# Patient Record
Sex: MALE | Race: WHITE | NOT HISPANIC OR LATINO | URBAN - METROPOLITAN AREA
[De-identification: names, ages, dates, MRNs, and addresses within clinical notes are randomized per-mention and may not be internally consistent; named-entity substitution may affect disease eponyms.]

---

## 2020-04-11 ENCOUNTER — INPATIENT (INPATIENT)
Facility: HOSPITAL | Age: 80
LOS: 9 days | Discharge: NURSING, REHAB OR LONG-TERM CARE | End: 2020-04-21
Attending: HOSPITALIST | Admitting: HOSPITALIST
Payer: COMMERCIAL

## 2020-04-11 VITALS
RESPIRATION RATE: 28 BRPM | DIASTOLIC BLOOD PRESSURE: 80 MMHG | TEMPERATURE: 99 F | HEART RATE: 83 BPM | SYSTOLIC BLOOD PRESSURE: 146 MMHG | OXYGEN SATURATION: 84 %

## 2020-04-11 DIAGNOSIS — R73.03 PREDIABETES: ICD-10-CM

## 2020-04-11 DIAGNOSIS — R09.02 HYPOXEMIA: ICD-10-CM

## 2020-04-11 DIAGNOSIS — I10 ESSENTIAL (PRIMARY) HYPERTENSION: ICD-10-CM

## 2020-04-11 DIAGNOSIS — E03.9 HYPOTHYROIDISM, UNSPECIFIED: ICD-10-CM

## 2020-04-11 DIAGNOSIS — K21.9 GASTRO-ESOPHAGEAL REFLUX DISEASE WITHOUT ESOPHAGITIS: ICD-10-CM

## 2020-04-11 DIAGNOSIS — Z29.9 ENCOUNTER FOR PROPHYLACTIC MEASURES, UNSPECIFIED: ICD-10-CM

## 2020-04-11 DIAGNOSIS — E78.5 HYPERLIPIDEMIA, UNSPECIFIED: ICD-10-CM

## 2020-04-11 DIAGNOSIS — Z71.89 OTHER SPECIFIED COUNSELING: ICD-10-CM

## 2020-04-11 DIAGNOSIS — R41.0 DISORIENTATION, UNSPECIFIED: ICD-10-CM

## 2020-04-11 DIAGNOSIS — U07.1 COVID-19: ICD-10-CM

## 2020-04-11 LAB
ALBUMIN SERPL ELPH-MCNC: 3.3 G/DL — SIGNIFICANT CHANGE UP (ref 3.3–5)
ALP SERPL-CCNC: 183 U/L — HIGH (ref 40–120)
ALT FLD-CCNC: 30 U/L — SIGNIFICANT CHANGE UP (ref 4–41)
ANION GAP SERPL CALC-SCNC: 17 MMO/L — HIGH (ref 7–14)
APPEARANCE UR: CLEAR — SIGNIFICANT CHANGE UP
APTT BLD: 31.3 SEC — SIGNIFICANT CHANGE UP (ref 27.5–36.3)
APTT BLD: 32.3 SEC — SIGNIFICANT CHANGE UP (ref 27.5–36.3)
AST SERPL-CCNC: 33 U/L — SIGNIFICANT CHANGE UP (ref 4–40)
BASOPHILS # BLD AUTO: 0.02 K/UL — SIGNIFICANT CHANGE UP (ref 0–0.2)
BASOPHILS NFR BLD AUTO: 0.2 % — SIGNIFICANT CHANGE UP (ref 0–2)
BILIRUB SERPL-MCNC: 0.7 MG/DL — SIGNIFICANT CHANGE UP (ref 0.2–1.2)
BILIRUB UR-MCNC: NEGATIVE — SIGNIFICANT CHANGE UP
BLOOD UR QL VISUAL: NEGATIVE — SIGNIFICANT CHANGE UP
BUN SERPL-MCNC: 23 MG/DL — SIGNIFICANT CHANGE UP (ref 7–23)
CALCIUM SERPL-MCNC: 10 MG/DL — SIGNIFICANT CHANGE UP (ref 8.4–10.5)
CHLORIDE SERPL-SCNC: 97 MMOL/L — LOW (ref 98–107)
CO2 SERPL-SCNC: 18 MMOL/L — LOW (ref 22–31)
COARSE GRAN CASTS #/AREA URNS AUTO: SIGNIFICANT CHANGE UP (ref 0–?)
COLOR SPEC: SIGNIFICANT CHANGE UP
CREAT SERPL-MCNC: 1.03 MG/DL — SIGNIFICANT CHANGE UP (ref 0.5–1.3)
CRP SERPL-MCNC: 174.1 MG/L — HIGH
D DIMER BLD IA.RAPID-MCNC: 4669 NG/ML — SIGNIFICANT CHANGE UP
EOSINOPHIL # BLD AUTO: 0.08 K/UL — SIGNIFICANT CHANGE UP (ref 0–0.5)
EOSINOPHIL NFR BLD AUTO: 0.8 % — SIGNIFICANT CHANGE UP (ref 0–6)
EPI CELLS # UR: SIGNIFICANT CHANGE UP
FIBRINOGEN PPP-MCNC: 1018 MG/DL — HIGH (ref 300–520)
GLUCOSE BLDC GLUCOMTR-MCNC: 167 MG/DL — HIGH (ref 70–99)
GLUCOSE SERPL-MCNC: 142 MG/DL — HIGH (ref 70–99)
GLUCOSE UR-MCNC: NEGATIVE — SIGNIFICANT CHANGE UP
HCT VFR BLD CALC: 41.5 % — SIGNIFICANT CHANGE UP (ref 39–50)
HGB BLD-MCNC: 13.9 G/DL — SIGNIFICANT CHANGE UP (ref 13–17)
HYALINE CASTS # UR AUTO: SIGNIFICANT CHANGE UP
IMM GRANULOCYTES NFR BLD AUTO: 0.5 % — SIGNIFICANT CHANGE UP (ref 0–1.5)
INR BLD: 2.46 — HIGH (ref 0.88–1.17)
INR BLD: 2.56 — HIGH (ref 0.88–1.17)
KETONES UR-MCNC: NEGATIVE — SIGNIFICANT CHANGE UP
LEUKOCYTE ESTERASE UR-ACNC: NEGATIVE — SIGNIFICANT CHANGE UP
LYMPHOCYTES # BLD AUTO: 0.96 K/UL — LOW (ref 1–3.3)
LYMPHOCYTES # BLD AUTO: 9.8 % — LOW (ref 13–44)
MCHC RBC-ENTMCNC: 28.6 PG — SIGNIFICANT CHANGE UP (ref 27–34)
MCHC RBC-ENTMCNC: 33.5 % — SIGNIFICANT CHANGE UP (ref 32–36)
MCV RBC AUTO: 85.4 FL — SIGNIFICANT CHANGE UP (ref 80–100)
MONOCYTES # BLD AUTO: 0.68 K/UL — SIGNIFICANT CHANGE UP (ref 0–0.9)
MONOCYTES NFR BLD AUTO: 7 % — SIGNIFICANT CHANGE UP (ref 2–14)
MUCOUS THREADS # UR AUTO: PRESENT — SIGNIFICANT CHANGE UP
NEUTROPHILS # BLD AUTO: 7.96 K/UL — HIGH (ref 1.8–7.4)
NEUTROPHILS NFR BLD AUTO: 81.7 % — HIGH (ref 43–77)
NITRITE UR-MCNC: NEGATIVE — SIGNIFICANT CHANGE UP
NRBC # FLD: 0 K/UL — SIGNIFICANT CHANGE UP (ref 0–0)
PH UR: 6 — SIGNIFICANT CHANGE UP (ref 5–8)
PLATELET # BLD AUTO: 353 K/UL — SIGNIFICANT CHANGE UP (ref 150–400)
PMV BLD: 10.1 FL — SIGNIFICANT CHANGE UP (ref 7–13)
POTASSIUM SERPL-MCNC: 4.6 MMOL/L — SIGNIFICANT CHANGE UP (ref 3.5–5.3)
POTASSIUM SERPL-SCNC: 4.6 MMOL/L — SIGNIFICANT CHANGE UP (ref 3.5–5.3)
PROCALCITONIN SERPL-MCNC: 0.42 NG/ML — HIGH (ref 0.02–0.1)
PROT SERPL-MCNC: 8.3 G/DL — SIGNIFICANT CHANGE UP (ref 6–8.3)
PROT UR-MCNC: 30 — SIGNIFICANT CHANGE UP
PROTHROM AB SERPL-ACNC: 29.1 SEC — HIGH (ref 9.8–13.1)
PROTHROM AB SERPL-ACNC: 30.1 SEC — HIGH (ref 9.8–13.1)
RBC # BLD: 4.86 M/UL — SIGNIFICANT CHANGE UP (ref 4.2–5.8)
RBC # FLD: 13.2 % — SIGNIFICANT CHANGE UP (ref 10.3–14.5)
RBC CASTS # UR COMP ASSIST: SIGNIFICANT CHANGE UP (ref 0–?)
SARS-COV-2 RNA SPEC QL NAA+PROBE: DETECTED
SODIUM SERPL-SCNC: 132 MMOL/L — LOW (ref 135–145)
SP GR SPEC: 1.01 — SIGNIFICANT CHANGE UP (ref 1–1.04)
UROBILINOGEN FLD QL: NORMAL — SIGNIFICANT CHANGE UP
WBC # BLD: 9.75 K/UL — SIGNIFICANT CHANGE UP (ref 3.8–10.5)
WBC # FLD AUTO: 9.75 K/UL — SIGNIFICANT CHANGE UP (ref 3.8–10.5)
WBC UR QL: SIGNIFICANT CHANGE UP (ref 0–?)

## 2020-04-11 PROCEDURE — 70450 CT HEAD/BRAIN W/O DYE: CPT | Mod: 26

## 2020-04-11 PROCEDURE — 71045 X-RAY EXAM CHEST 1 VIEW: CPT | Mod: 26

## 2020-04-11 PROCEDURE — 71275 CT ANGIOGRAPHY CHEST: CPT | Mod: 26

## 2020-04-11 PROCEDURE — 99233 SBSQ HOSP IP/OBS HIGH 50: CPT

## 2020-04-11 RX ORDER — ENOXAPARIN SODIUM 100 MG/ML
30 INJECTION SUBCUTANEOUS ONCE
Refills: 0 | Status: COMPLETED | OUTPATIENT
Start: 2020-04-11 | End: 2020-04-11

## 2020-04-11 RX ORDER — DEXTROSE 50 % IN WATER 50 %
25 SYRINGE (ML) INTRAVENOUS ONCE
Refills: 0 | Status: DISCONTINUED | OUTPATIENT
Start: 2020-04-11 | End: 2020-04-21

## 2020-04-11 RX ORDER — ALBUTEROL 90 UG/1
2 AEROSOL, METERED ORAL EVERY 6 HOURS
Refills: 0 | Status: DISCONTINUED | OUTPATIENT
Start: 2020-04-11 | End: 2020-04-21

## 2020-04-11 RX ORDER — METFORMIN HYDROCHLORIDE 850 MG/1
500 TABLET ORAL
Qty: 0 | Refills: 0 | DISCHARGE

## 2020-04-11 RX ORDER — LEVOTHYROXINE SODIUM 125 MCG
75 TABLET ORAL DAILY
Refills: 0 | Status: DISCONTINUED | OUTPATIENT
Start: 2020-04-11 | End: 2020-04-21

## 2020-04-11 RX ORDER — TAMSULOSIN HYDROCHLORIDE 0.4 MG/1
0.4 CAPSULE ORAL AT BEDTIME
Refills: 0 | Status: DISCONTINUED | OUTPATIENT
Start: 2020-04-11 | End: 2020-04-21

## 2020-04-11 RX ORDER — ACETAMINOPHEN 500 MG
650 TABLET ORAL ONCE
Refills: 0 | Status: COMPLETED | OUTPATIENT
Start: 2020-04-11 | End: 2020-04-11

## 2020-04-11 RX ORDER — PANTOPRAZOLE SODIUM 20 MG/1
40 TABLET, DELAYED RELEASE ORAL
Refills: 0 | Status: DISCONTINUED | OUTPATIENT
Start: 2020-04-11 | End: 2020-04-21

## 2020-04-11 RX ORDER — ENOXAPARIN SODIUM 100 MG/ML
40 INJECTION SUBCUTANEOUS DAILY
Refills: 0 | Status: DISCONTINUED | OUTPATIENT
Start: 2020-04-11 | End: 2020-04-11

## 2020-04-11 RX ORDER — FOLIC ACID 0.8 MG
1 TABLET ORAL DAILY
Refills: 0 | Status: DISCONTINUED | OUTPATIENT
Start: 2020-04-11 | End: 2020-04-21

## 2020-04-11 RX ORDER — LEVOTHYROXINE SODIUM 125 MCG
1 TABLET ORAL
Qty: 0 | Refills: 0 | DISCHARGE

## 2020-04-11 RX ORDER — ENOXAPARIN SODIUM 100 MG/ML
70 INJECTION SUBCUTANEOUS
Refills: 0 | Status: DISCONTINUED | OUTPATIENT
Start: 2020-04-12 | End: 2020-04-21

## 2020-04-11 RX ORDER — PANTOPRAZOLE SODIUM 20 MG/1
1 TABLET, DELAYED RELEASE ORAL
Qty: 0 | Refills: 0 | DISCHARGE

## 2020-04-11 RX ORDER — ACETAMINOPHEN 500 MG
650 TABLET ORAL EVERY 4 HOURS
Refills: 0 | Status: DISCONTINUED | OUTPATIENT
Start: 2020-04-11 | End: 2020-04-21

## 2020-04-11 RX ORDER — GLUCAGON INJECTION, SOLUTION 0.5 MG/.1ML
1 INJECTION, SOLUTION SUBCUTANEOUS ONCE
Refills: 0 | Status: DISCONTINUED | OUTPATIENT
Start: 2020-04-11 | End: 2020-04-21

## 2020-04-11 RX ORDER — METOPROLOL TARTRATE 50 MG
1 TABLET ORAL
Qty: 0 | Refills: 0 | DISCHARGE

## 2020-04-11 RX ORDER — FOLIC ACID 0.8 MG
1 TABLET ORAL
Qty: 0 | Refills: 0 | DISCHARGE

## 2020-04-11 RX ORDER — SIMVASTATIN 20 MG/1
20 TABLET, FILM COATED ORAL AT BEDTIME
Refills: 0 | Status: DISCONTINUED | OUTPATIENT
Start: 2020-04-11 | End: 2020-04-21

## 2020-04-11 RX ORDER — DEXTROSE 50 % IN WATER 50 %
15 SYRINGE (ML) INTRAVENOUS ONCE
Refills: 0 | Status: DISCONTINUED | OUTPATIENT
Start: 2020-04-11 | End: 2020-04-21

## 2020-04-11 RX ORDER — INSULIN LISPRO 100/ML
VIAL (ML) SUBCUTANEOUS
Refills: 0 | Status: DISCONTINUED | OUTPATIENT
Start: 2020-04-11 | End: 2020-04-21

## 2020-04-11 RX ORDER — FUROSEMIDE 40 MG
20 TABLET ORAL ONCE
Refills: 0 | Status: COMPLETED | OUTPATIENT
Start: 2020-04-11 | End: 2020-04-11

## 2020-04-11 RX ORDER — SIMVASTATIN 20 MG/1
1 TABLET, FILM COATED ORAL
Qty: 0 | Refills: 0 | DISCHARGE

## 2020-04-11 RX ORDER — METOPROLOL TARTRATE 50 MG
25 TABLET ORAL DAILY
Refills: 0 | Status: DISCONTINUED | OUTPATIENT
Start: 2020-04-11 | End: 2020-04-21

## 2020-04-11 RX ORDER — DEXTROSE 50 % IN WATER 50 %
12.5 SYRINGE (ML) INTRAVENOUS ONCE
Refills: 0 | Status: DISCONTINUED | OUTPATIENT
Start: 2020-04-11 | End: 2020-04-21

## 2020-04-11 RX ORDER — SODIUM CHLORIDE 9 MG/ML
1000 INJECTION, SOLUTION INTRAVENOUS
Refills: 0 | Status: DISCONTINUED | OUTPATIENT
Start: 2020-04-11 | End: 2020-04-21

## 2020-04-11 RX ORDER — METFORMIN HYDROCHLORIDE 850 MG/1
500 TABLET ORAL DAILY
Refills: 0 | Status: DISCONTINUED | OUTPATIENT
Start: 2020-04-11 | End: 2020-04-11

## 2020-04-11 RX ADMIN — SIMVASTATIN 20 MILLIGRAM(S): 20 TABLET, FILM COATED ORAL at 22:55

## 2020-04-11 RX ADMIN — Medication 40 MILLIGRAM(S): at 05:02

## 2020-04-11 RX ADMIN — TAMSULOSIN HYDROCHLORIDE 0.4 MILLIGRAM(S): 0.4 CAPSULE ORAL at 22:55

## 2020-04-11 RX ADMIN — Medication 1 MILLIGRAM(S): at 13:01

## 2020-04-11 RX ADMIN — ENOXAPARIN SODIUM 40 MILLIGRAM(S): 100 INJECTION SUBCUTANEOUS at 17:40

## 2020-04-11 RX ADMIN — Medication 20 MILLIGRAM(S): at 05:02

## 2020-04-11 RX ADMIN — Medication 650 MILLIGRAM(S): at 05:02

## 2020-04-11 RX ADMIN — METFORMIN HYDROCHLORIDE 500 MILLIGRAM(S): 850 TABLET ORAL at 13:02

## 2020-04-11 RX ADMIN — ENOXAPARIN SODIUM 30 MILLIGRAM(S): 100 INJECTION SUBCUTANEOUS at 22:55

## 2020-04-11 NOTE — ED PROVIDER NOTE - PMH
Gastroesophageal reflux disease, esophagitis presence not specified    HLD (hyperlipidemia)    HTN (hypertension)    Hypothyroid

## 2020-04-11 NOTE — ED PROVIDER NOTE - PHYSICAL EXAMINATION
General appearance: patient tachypneic.   Eyes: anicteric sclerae, moist conjunctivae   HENT: Atraumatic; oropharynx clear with dry mucous membranes   Neck: Trachea midline; Full range of motion   Pulm: Patient tachypneic with mild suprasternal accessory muscle use.   CV: Regular Rhythm and Rate;    Abdomen: Soft, non-tender, non-distended   Extremities: No peripheral edema   Skin: Normal temperature, turgor and texture

## 2020-04-11 NOTE — H&P ADULT - ASSESSMENT
81 y/o M admitted for acute hypoxic respiratory failure possibly secondary to COVID-19 infection. He has radiographic evidence of B/L PNA. He had been treated with a 5 day course of Plaquenil and azithromycin out OSH.

## 2020-04-11 NOTE — ED PROVIDER NOTE - ATTENDING CONTRIBUTION TO CARE
Attending Attestation: Dr. Goldstein  I have personally performed a history and physical examination of the patient and discussed management with the resident as well as the patient.  I reviewed the resident's note and agree with the documented findings and plan of care.  I have authored and modified critical sections of the Provider Note, including but not limited to HPI, Physical Exam and MDM. 80 year old M PMH hypothyroidism, HTN, BPH, GERD presenting for shortness of breath. Likely 2/2 COVID19. CXR with bilateral patchy opacities. Patient requiring oxygen and is tachypneic. Will administer steroids and lasix. CTH and UA to r/o other causes for confusion. Admit.

## 2020-04-11 NOTE — ED ADULT NURSE NOTE - CHIEF COMPLAINT QUOTE
Patient complaining of increase shortness of breath this evening. denies chest pain. the shortness of breath woke him up from his sleep. patient denies fever/chills. patient arrive to ED on 4LNC 02 sat 84%. placed on non-rebreather 100% now 94%. tachypneic RR 28 with increase work of breathing. patient recently was dx with pneumonia, finished ABT

## 2020-04-11 NOTE — H&P ADULT - PROBLEM SELECTOR PLAN 4
Continue home dose of pantoprazole Head CT negative for any cute changes  Likely toxic metabolic in setting of COVID-19  - continue to monitor mental exam

## 2020-04-11 NOTE — H&P ADULT - PROBLEM SELECTOR PLAN 3
Head CT negative for any cute changes. Advanced directives not specified by family yet. Daughter will discuss with her mother aka wife of the patient.   - consider consulting hospital clinical review team if needed

## 2020-04-11 NOTE — ED PROVIDER NOTE - CLINICAL SUMMARY MEDICAL DECISION MAKING FREE TEXT BOX
80 year old M PMH hypothyroidism, HTN, BPH, GERD presenting for shortness of breath. Likely 2/2 COVID19. CXR with bilateral patchy opacities. Patient requiring oxygen, but is tachypneic. Will administer steroids and lasix. CTH and UA to r/o other causes for confusion. Admit. 80 year old M PMH hypothyroidism, HTN, BPH, GERD presenting for shortness of breath. Likely 2/2 COVID19. CXR with bilateral patchy opacities. Patient requiring oxygen and is tachypneic. Will administer steroids and lasix. CTH and UA to r/o other causes for confusion. Admit.

## 2020-04-11 NOTE — ED PROVIDER NOTE - OBJECTIVE STATEMENT
80 year old M PMH hypothyroidism, HTN, BPH, GERD presenting for shortness of breath. Patient is AOx4 at baseline but is confused in the ER. History obtained from his daughter and HCP Dionne. According to her, on 3/31 he went to Urgent Care for SOB. Was sent to [facility] for bilateral PNA. Was tested for COVID19 there but she was not told the result. She does know that he was discharged on plaquenil , z pack and tylenol. Dc on 4/2. Was at home on quarantined. Done with meds for 3-4 days. Was improving but then hallucinated 2-3x. Called pcp, Dr Ariza, and the thought was that it was dehydration. Tonight, patient was still coughing and moaning while sleeping and struggling for breath.   Home meds:   levothyroxine 75mcg  metoprolol 25mg qd  pantoprazole 40  folic acid 1mg  tamsulosin 0.4mg  simvastast 20mg  metformin    HCP: Dionne Howard, daughter. Patient has not discussed advanced directives. Explained to daughter that patient is elderly and would not do well on a ventilator. She understood and will consider advanced directives. 80 year old M PMH hypothyroidism, HTN, BPH, GERD presenting for shortness of breath. Patient is AOx4 at baseline but is confused in the ER. History obtained from his daughter and HCP Dionne. According to her, on 3/31 he went to Urgent Care for SOB. Was sent to [facility] for bilateral PNA. Was tested for COVID19 there but she was not told the result. She does know that he was discharged on plaquenil , z pack and tylenol. Dc on 4/2. Was at home on quarantined. Done with meds for 3-4 days. Was improving but then hallucinated 2-3x. Called pcp, Dr Ariza, and the thought was that it was dehydration. Tonight, patient was still coughing and moaning while sleeping and struggling for breath. Is in NAD at present, but tachypneic and confused.   Home meds:   levothyroxine 75mcg  metoprolol 25mg qd  pantoprazole 40  folic acid 1mg  tamsulosin 0.4mg  simvastast 20mg  metformin    HCP: Dionne Howard, daughter. Patient has not discussed advanced directives. Explained to daughter that patient is elderly and would not do well on a ventilator. She understood and will consider advanced directives.

## 2020-04-11 NOTE — ED PROVIDER NOTE - CRITICAL CARE PROVIDED
telephone consultation with the patient's family/documentation/direct patient care (not related to procedure)/additional history taking/interpretation of diagnostic studies

## 2020-04-11 NOTE — H&P ADULT - PROBLEM SELECTOR PLAN 1
- Currently on 4 L NC with sats >94%. Monitor sats.   - COVID-PCR pending   - s/p plaquenil and azithromycin.   - Advanced directives not specified by family yet. Daughter will discuss with her mother aka wife of the patient.   - Anticoagulation as per protocol.   - Labs as per protocol. - Currently on 4 L NC with sats >94%. Monitor sats.   - COVID-PCR pending   - s/p plaquenil and azithromycin.   - Anticoagulation as per protocol.   - Labs as per protocol.

## 2020-04-11 NOTE — H&P ADULT - PROBLEM SELECTOR PLAN 5
- Continue home dose of Simvastin Blood sugars stable  Holding home metformin  - MEE, FS QID, tailor insulin accordingly

## 2020-04-11 NOTE — H&P ADULT - HISTORY OF PRESENT ILLNESS
80 year old M PMH hypothyroidism, HTN, BPH, GERD presenting for shortness of breath. Patient is AOx4 at baseline but is confused in the ER. History obtained from his daughter and HCP Dionne via phone. According to her, on 3/31 he went to Urgent Care for SOB. Was sent to [facility] for bilateral PNA. Was tested for COVID19 there but she was not told the result. She does know that he was discharged on plaquenil , z pack and tylenol. Dc on 4/2. Was at home on quarantined. Done with meds for 3-4 days. Was improving but then hallucinated 2-3x yesterday. Called pcp, Dr Ariza, and the thought was that it was dehydration. Tonight, patient was still coughing and moaning while sleeping and struggling for breath.   HCP: Dionne Howard, daughter. Patient has not discussed advanced directives. Explained to daughter that patient is elderly and would not do well on a ventilator. She understood and will consider advanced directives.

## 2020-04-11 NOTE — H&P ADULT - PROBLEM SELECTOR PLAN 2
- Continue home dose of metformin Likely 2/2 COVID-19, high suspicion for VTE given markedly elevated d-dimer  - c/w management for COVID-19 as above  - CT angio chest ordered urgent  - albuterol, IS  - wean O2 as tolerated

## 2020-04-11 NOTE — ED ADULT TRIAGE NOTE - CHIEF COMPLAINT QUOTE
Patient complaining of increase shortness of breath this evening. denies chest pain. the shortness of breath woke him up from his sleep. patient denies fever/chills. patient arrive to ED on 4LNC 02 sat 84%. placed on non-rebreather 100% now 94%. tachypneic RR 28 with increase work of breathing Patient complaining of increase shortness of breath this evening. denies chest pain. the shortness of breath woke him up from his sleep. patient denies fever/chills. patient arrive to ED on 4LNC 02 sat 84%. placed on non-rebreather 100% now 94%. tachypneic RR 28 with increase work of breathing. patient recently was dx with pneumonia, finished ABT

## 2020-04-11 NOTE — H&P ADULT - NSICDXPASTMEDICALHX_GEN_ALL_CORE_FT
PAST MEDICAL HISTORY:  Gastroesophageal reflux disease, esophagitis presence not specified     HLD (hyperlipidemia)     HTN (hypertension)     Hypothyroid     Pre-diabetes

## 2020-04-11 NOTE — H&P ADULT - NSHPLABSRESULTS_GEN_ALL_CORE
.  LABS:                         13.9   9.75  )-----------( 353      ( 11 Apr 2020 04:00 )             41.5     04-11    132<L>  |  97<L>  |  23  ----------------------------<  142<H>  4.6   |  18<L>  |  1.03    Ca    10.0      11 Apr 2020 04:00    TPro  8.3  /  Alb  3.3  /  TBili  0.7  /  DBili  x   /  AST  33  /  ALT  30  /  AlkPhos  183<H>  04-11    PT/INR - ( 11 Apr 2020 04:00 )   PT: 30.1 SEC;   INR: 2.56          PTT - ( 11 Apr 2020 04:00 )  PTT:31.3 SEC        RADIOLOGY, EKG & ADDITIONAL TESTS: Reviewed.

## 2020-04-12 LAB
CULTURE RESULTS: SIGNIFICANT CHANGE UP
GLUCOSE BLDC GLUCOMTR-MCNC: 101 MG/DL — HIGH (ref 70–99)
GLUCOSE BLDC GLUCOMTR-MCNC: 136 MG/DL — HIGH (ref 70–99)
GLUCOSE BLDC GLUCOMTR-MCNC: 160 MG/DL — HIGH (ref 70–99)
GLUCOSE BLDC GLUCOMTR-MCNC: 94 MG/DL — SIGNIFICANT CHANGE UP (ref 70–99)
HBA1C BLD-MCNC: 6.8 % — HIGH (ref 4–5.6)
SPECIMEN SOURCE: SIGNIFICANT CHANGE UP

## 2020-04-12 PROCEDURE — 99233 SBSQ HOSP IP/OBS HIGH 50: CPT

## 2020-04-12 RX ADMIN — Medication 25 MILLIGRAM(S): at 05:21

## 2020-04-12 RX ADMIN — SIMVASTATIN 20 MILLIGRAM(S): 20 TABLET, FILM COATED ORAL at 23:48

## 2020-04-12 RX ADMIN — ENOXAPARIN SODIUM 70 MILLIGRAM(S): 100 INJECTION SUBCUTANEOUS at 17:25

## 2020-04-12 RX ADMIN — TAMSULOSIN HYDROCHLORIDE 0.4 MILLIGRAM(S): 0.4 CAPSULE ORAL at 23:48

## 2020-04-12 RX ADMIN — ALBUTEROL 2 PUFF(S): 90 AEROSOL, METERED ORAL at 17:12

## 2020-04-12 RX ADMIN — Medication 75 MICROGRAM(S): at 05:21

## 2020-04-12 RX ADMIN — Medication 1 MILLIGRAM(S): at 12:31

## 2020-04-12 RX ADMIN — Medication 1: at 12:31

## 2020-04-12 RX ADMIN — PANTOPRAZOLE SODIUM 40 MILLIGRAM(S): 20 TABLET, DELAYED RELEASE ORAL at 06:55

## 2020-04-12 RX ADMIN — ALBUTEROL 2 PUFF(S): 90 AEROSOL, METERED ORAL at 04:00

## 2020-04-12 RX ADMIN — ALBUTEROL 2 PUFF(S): 90 AEROSOL, METERED ORAL at 23:49

## 2020-04-12 RX ADMIN — ALBUTEROL 2 PUFF(S): 90 AEROSOL, METERED ORAL at 11:00

## 2020-04-12 NOTE — PROGRESS NOTE ADULT - PROBLEM SELECTOR PLAN 4
Per chart review pt w/ preDM however on metformen at home. Blood sugars stable here  -Holding home metformin  -ISS for now and will adjust regimen accordingly   -f/u a1c level

## 2020-04-12 NOTE — PROGRESS NOTE ADULT - ATTENDING COMMENTS
Ronnie Garvey MD  Attending Physician, Hospitalist Medicine   (p) [phone]    CPT code: 30433  Acute hypoxic respiratory failure  Metabolic encephalopathy   Acute PE  COVID 19 positive   HTN  HLD  Hypothyroid  preDM

## 2020-04-12 NOTE — PROGRESS NOTE ADULT - SUBJECTIVE AND OBJECTIVE BOX
Patient is a 80y old  Male who presents with a chief complaint of The patient is a 80y Male complaining of shortness of breath (2020 08:50)      SUBJECTIVE / OVERNIGHT EVENTS:    Review of Systems:     MEDICATIONS  (STANDING):  ALBUTerol    90 MICROgram(s) HFA Inhaler 2 Puff(s) Inhalation every 6 hours  dextrose 5%. 1000 milliLiter(s) (50 mL/Hr) IV Continuous <Continuous>  dextrose 50% Injectable 12.5 Gram(s) IV Push once  dextrose 50% Injectable 25 Gram(s) IV Push once  dextrose 50% Injectable 25 Gram(s) IV Push once  enoxaparin Injectable 70 milliGRAM(s) SubCutaneous two times a day  folic acid 1 milliGRAM(s) Oral daily  insulin lispro (HumaLOG) corrective regimen sliding scale   SubCutaneous three times a day before meals  levothyroxine 75 MICROGram(s) Oral daily  metoprolol succinate ER 25 milliGRAM(s) Oral daily  pantoprazole    Tablet 40 milliGRAM(s) Oral before breakfast  simvastatin 20 milliGRAM(s) Oral at bedtime  tamsulosin 0.4 milliGRAM(s) Oral at bedtime    MEDICATIONS  (PRN):  acetaminophen   Tablet .. 650 milliGRAM(s) Oral every 4 hours PRN Temp greater or equal to 38.5C (101.3F)  acetaminophen  Suppository .. 650 milliGRAM(s) Rectal every 4 hours PRN Temp greater or equal to 38.5C (101.3F)  dextrose 40% Gel 15 Gram(s) Oral once PRN Blood Glucose LESS THAN 70 milliGRAM(s)/deciliter  glucagon  Injectable 1 milliGRAM(s) IntraMuscular once PRN Glucose LESS THAN 70 milligrams/deciliter      PHYSICAL EXAM:    Vital Signs Last 24 Hrs  T(C): 36.7 (2020 08:57), Max: 37.1 (2020 22:44)  T(F): 98 (2020 08:57), Max: 98.8 (2020 05:21)  HR: 80 (2020 08:57) (76 - 92)  BP: 128/76 (2020 08:57) (117/67 - 141/79)  BP(mean): 95 (2020 05:21) (95 - 95)  RR: 22 (2020 08:57) (20 - 22)  SpO2: 92% (2020 08:57) (85% - 94%)    LABS:  CAPILLARY BLOOD GLUCOSE      POCT Blood Glucose.: 136 mg/dL (2020 09:38)  POCT Blood Glucose.: 167 mg/dL (2020 17:25)                          13.9   9.75  )-----------( 353      ( 2020 04:00 )             41.5     04-11    132<L>  |  97<L>  |  23  ----------------------------<  142<H>  4.6   |  18<L>  |  1.03    Ca    10.0      2020 04:00    TPro  8.3  /  Alb  3.3  /  TBili  0.7  /  DBili  x   /  AST  33  /  ALT  30  /  AlkPhos  183<H>  04-11    PT/INR - ( 2020 13:10 )   PT: 29.1 SEC;   INR: 2.46          PTT - ( 2020 13:10 )  PTT:32.3 SEC      Urinalysis Basic - ( 2020 07:50 )    Color: LT. YELLOW / Appearance: CLEAR / S.014 / pH: 6.0  Gluc: NEGATIVE / Ketone: NEGATIVE  / Bili: NEGATIVE / Urobili: NORMAL   Blood: NEGATIVE / Protein: 30 / Nitrite: NEGATIVE   Leuk Esterase: NEGATIVE / RBC: 0-2 / WBC 2-5   Sq Epi: x / Non Sq Epi: OCC / Bacteria: x        RADIOLOGY & ADDITIONAL TESTS:    Imaging Personally Reviewed:    Consultant(s) Notes Reviewed:      Care Discussed with Consultants/Other Providers: Patient is a 80y old  Male who presents with a chief complaint of The patient is a 80y Male complaining of shortness of breath (2020 08:50)      SUBJECTIVE / OVERNIGHT EVENTS: Pt states that his SOB has improved. However, daughter states pt reported its about the same. Pt denies CP, n/v/d.     Review of Systems:     MEDICATIONS  (STANDING):  ALBUTerol    90 MICROgram(s) HFA Inhaler 2 Puff(s) Inhalation every 6 hours  dextrose 5%. 1000 milliLiter(s) (50 mL/Hr) IV Continuous <Continuous>  dextrose 50% Injectable 12.5 Gram(s) IV Push once  dextrose 50% Injectable 25 Gram(s) IV Push once  dextrose 50% Injectable 25 Gram(s) IV Push once  enoxaparin Injectable 70 milliGRAM(s) SubCutaneous two times a day  folic acid 1 milliGRAM(s) Oral daily  insulin lispro (HumaLOG) corrective regimen sliding scale   SubCutaneous three times a day before meals  levothyroxine 75 MICROGram(s) Oral daily  metoprolol succinate ER 25 milliGRAM(s) Oral daily  pantoprazole    Tablet 40 milliGRAM(s) Oral before breakfast  simvastatin 20 milliGRAM(s) Oral at bedtime  tamsulosin 0.4 milliGRAM(s) Oral at bedtime    MEDICATIONS  (PRN):  acetaminophen   Tablet .. 650 milliGRAM(s) Oral every 4 hours PRN Temp greater or equal to 38.5C (101.3F)  acetaminophen  Suppository .. 650 milliGRAM(s) Rectal every 4 hours PRN Temp greater or equal to 38.5C (101.3F)  dextrose 40% Gel 15 Gram(s) Oral once PRN Blood Glucose LESS THAN 70 milliGRAM(s)/deciliter  glucagon  Injectable 1 milliGRAM(s) IntraMuscular once PRN Glucose LESS THAN 70 milligrams/deciliter      PHYSICAL EXAM:    Vital Signs Last 24 Hrs  T(C): 36.7 (2020 08:57), Max: 37.1 (2020 22:44)  T(F): 98 (2020 08:57), Max: 98.8 (2020 05:21)  HR: 80 (2020 08:57) (76 - 92)  BP: 128/76 (2020 08:57) (117/67 - 141/79)  BP(mean): 95 (2020 05:21) (95 - 95)  RR: 22 (2020 08:57) (20 - 22)  SpO2: 92% (2020 08:57) (85% - 94%)  PHYSICAL EXAM:  Constitutional: NAD, elderly/frail  Eyes: EOMI, no scleral icterus or injection  ENMT: supple,   Respiratory: breathing comfortably   Cardiovascular: No LE edema   Gastrointestinal: Soft, NT/ND, +BS   Extremities: Warm and well perfused   Vascular: +DP/PT pulses   Neurological: AX0x3, non focal   Skin: unremarkable   Psychiatric: Normal mood and affect       LABS:  CAPILLARY BLOOD GLUCOSE      POCT Blood Glucose.: 136 mg/dL (2020 09:38)  POCT Blood Glucose.: 167 mg/dL (2020 17:25)                          13.9   9.75  )-----------( 353      ( 2020 04:00 )             41.5     04-11    132<L>  |  97<L>  |  23  ----------------------------<  142<H>  4.6   |  18<L>  |  1.03    Ca    10.0      2020 04:00    TPro  8.3  /  Alb  3.3  /  TBili  0.7  /  DBili  x   /  AST  33  /  ALT  30  /  AlkPhos  183<H>  04-11    PT/INR - ( 2020 13:10 )   PT: 29.1 SEC;   INR: 2.46          PTT - ( 2020 13:10 )  PTT:32.3 SEC      Urinalysis Basic - ( 2020 07:50 )    Color: LT. YELLOW / Appearance: CLEAR / S.014 / pH: 6.0  Gluc: NEGATIVE / Ketone: NEGATIVE  / Bili: NEGATIVE / Urobili: NORMAL   Blood: NEGATIVE / Protein: 30 / Nitrite: NEGATIVE   Leuk Esterase: NEGATIVE / RBC: 0-2 / WBC 2-5   Sq Epi: x / Non Sq Epi: OCC / Bacteria: x        RADIOLOGY & ADDITIONAL TESTS:    Imaging Personally Reviewed:    Consultant(s) Notes Reviewed:      Care Discussed with Consultants/Other Providers:

## 2020-04-13 LAB
ALBUMIN SERPL ELPH-MCNC: 3.2 G/DL — LOW (ref 3.3–5)
ALP SERPL-CCNC: 153 U/L — HIGH (ref 40–120)
ALT FLD-CCNC: 33 U/L — SIGNIFICANT CHANGE UP (ref 4–41)
ANION GAP SERPL CALC-SCNC: 19 MMO/L — HIGH (ref 7–14)
APTT BLD: 35.5 SEC — SIGNIFICANT CHANGE UP (ref 27.5–36.3)
AST SERPL-CCNC: 33 U/L — SIGNIFICANT CHANGE UP (ref 4–40)
BASOPHILS # BLD AUTO: 0.03 K/UL — SIGNIFICANT CHANGE UP (ref 0–0.2)
BASOPHILS NFR BLD AUTO: 0.3 % — SIGNIFICANT CHANGE UP (ref 0–2)
BILIRUB SERPL-MCNC: 0.6 MG/DL — SIGNIFICANT CHANGE UP (ref 0.2–1.2)
BUN SERPL-MCNC: 40 MG/DL — HIGH (ref 7–23)
CALCIUM SERPL-MCNC: 10.6 MG/DL — HIGH (ref 8.4–10.5)
CHLORIDE SERPL-SCNC: 103 MMOL/L — SIGNIFICANT CHANGE UP (ref 98–107)
CO2 SERPL-SCNC: 18 MMOL/L — LOW (ref 22–31)
CREAT SERPL-MCNC: 1.16 MG/DL — SIGNIFICANT CHANGE UP (ref 0.5–1.3)
CRP SERPL-MCNC: 133.6 MG/L — HIGH
D DIMER BLD IA.RAPID-MCNC: 2992 NG/ML — SIGNIFICANT CHANGE UP
EOSINOPHIL # BLD AUTO: 0.07 K/UL — SIGNIFICANT CHANGE UP (ref 0–0.5)
EOSINOPHIL NFR BLD AUTO: 0.7 % — SIGNIFICANT CHANGE UP (ref 0–6)
FERRITIN SERPL-MCNC: 1177 NG/ML — HIGH (ref 30–400)
GLUCOSE BLDC GLUCOMTR-MCNC: 121 MG/DL — HIGH (ref 70–99)
GLUCOSE BLDC GLUCOMTR-MCNC: 162 MG/DL — HIGH (ref 70–99)
GLUCOSE SERPL-MCNC: 153 MG/DL — HIGH (ref 70–99)
HCT VFR BLD CALC: 42.4 % — SIGNIFICANT CHANGE UP (ref 39–50)
HGB BLD-MCNC: 13.7 G/DL — SIGNIFICANT CHANGE UP (ref 13–17)
IMM GRANULOCYTES NFR BLD AUTO: 0.3 % — SIGNIFICANT CHANGE UP (ref 0–1.5)
INR BLD: 2.35 — HIGH (ref 0.88–1.17)
LYMPHOCYTES # BLD AUTO: 0.91 K/UL — LOW (ref 1–3.3)
LYMPHOCYTES # BLD AUTO: 9.4 % — LOW (ref 13–44)
MAGNESIUM SERPL-MCNC: 2.4 MG/DL — SIGNIFICANT CHANGE UP (ref 1.6–2.6)
MCHC RBC-ENTMCNC: 28.2 PG — SIGNIFICANT CHANGE UP (ref 27–34)
MCHC RBC-ENTMCNC: 32.3 % — SIGNIFICANT CHANGE UP (ref 32–36)
MCV RBC AUTO: 87.4 FL — SIGNIFICANT CHANGE UP (ref 80–100)
MONOCYTES # BLD AUTO: 0.84 K/UL — SIGNIFICANT CHANGE UP (ref 0–0.9)
MONOCYTES NFR BLD AUTO: 8.7 % — SIGNIFICANT CHANGE UP (ref 2–14)
NEUTROPHILS # BLD AUTO: 7.79 K/UL — HIGH (ref 1.8–7.4)
NEUTROPHILS NFR BLD AUTO: 80.6 % — HIGH (ref 43–77)
NRBC # FLD: 0 K/UL — SIGNIFICANT CHANGE UP (ref 0–0)
PHOSPHATE SERPL-MCNC: 3.3 MG/DL — SIGNIFICANT CHANGE UP (ref 2.5–4.5)
PLATELET # BLD AUTO: 359 K/UL — SIGNIFICANT CHANGE UP (ref 150–400)
PMV BLD: 10.8 FL — SIGNIFICANT CHANGE UP (ref 7–13)
POTASSIUM SERPL-MCNC: 4.4 MMOL/L — SIGNIFICANT CHANGE UP (ref 3.5–5.3)
POTASSIUM SERPL-SCNC: 4.4 MMOL/L — SIGNIFICANT CHANGE UP (ref 3.5–5.3)
PROCALCITONIN SERPL-MCNC: 0.3 NG/ML — HIGH (ref 0.02–0.1)
PROT SERPL-MCNC: 8.5 G/DL — HIGH (ref 6–8.3)
PROTHROM AB SERPL-ACNC: 27.7 SEC — HIGH (ref 9.8–13.1)
RBC # BLD: 4.85 M/UL — SIGNIFICANT CHANGE UP (ref 4.2–5.8)
RBC # FLD: 13.5 % — SIGNIFICANT CHANGE UP (ref 10.3–14.5)
SODIUM SERPL-SCNC: 140 MMOL/L — SIGNIFICANT CHANGE UP (ref 135–145)
TROPONIN T, HIGH SENSITIVITY: 21 NG/L — SIGNIFICANT CHANGE UP (ref ?–14)
WBC # BLD: 9.67 K/UL — SIGNIFICANT CHANGE UP (ref 3.8–10.5)
WBC # FLD AUTO: 9.67 K/UL — SIGNIFICANT CHANGE UP (ref 3.8–10.5)

## 2020-04-13 PROCEDURE — 99233 SBSQ HOSP IP/OBS HIGH 50: CPT

## 2020-04-13 RX ADMIN — SIMVASTATIN 20 MILLIGRAM(S): 20 TABLET, FILM COATED ORAL at 23:14

## 2020-04-13 RX ADMIN — ALBUTEROL 2 PUFF(S): 90 AEROSOL, METERED ORAL at 23:14

## 2020-04-13 RX ADMIN — ALBUTEROL 2 PUFF(S): 90 AEROSOL, METERED ORAL at 17:20

## 2020-04-13 RX ADMIN — ALBUTEROL 2 PUFF(S): 90 AEROSOL, METERED ORAL at 08:54

## 2020-04-13 RX ADMIN — Medication 25 MILLIGRAM(S): at 04:38

## 2020-04-13 RX ADMIN — Medication 1: at 08:54

## 2020-04-13 RX ADMIN — ENOXAPARIN SODIUM 70 MILLIGRAM(S): 100 INJECTION SUBCUTANEOUS at 17:21

## 2020-04-13 RX ADMIN — ENOXAPARIN SODIUM 70 MILLIGRAM(S): 100 INJECTION SUBCUTANEOUS at 04:37

## 2020-04-13 RX ADMIN — TAMSULOSIN HYDROCHLORIDE 0.4 MILLIGRAM(S): 0.4 CAPSULE ORAL at 23:14

## 2020-04-13 RX ADMIN — PANTOPRAZOLE SODIUM 40 MILLIGRAM(S): 20 TABLET, DELAYED RELEASE ORAL at 04:38

## 2020-04-13 RX ADMIN — ALBUTEROL 2 PUFF(S): 90 AEROSOL, METERED ORAL at 05:26

## 2020-04-13 RX ADMIN — Medication 1 MILLIGRAM(S): at 13:03

## 2020-04-13 RX ADMIN — Medication 75 MICROGRAM(S): at 04:37

## 2020-04-13 NOTE — PROGRESS NOTE ADULT - SUBJECTIVE AND OBJECTIVE BOX
Dr. Tara Alcaraz  Pager [phone]    PROGRESS NOTE:     Patient is a 80y old  Male who presents with a chief complaint of The patient is a 80y Male complaining of shortness of breath (12 Apr 2020 09:44)      SUBJECTIVE / OVERNIGHT EVENTS: pt breathing ok on 6L, dyspnea with exertion  ADDITIONAL REVIEW OF SYSTEMS:  afebrile     MEDICATIONS  (STANDING):  ALBUTerol    90 MICROgram(s) HFA Inhaler 2 Puff(s) Inhalation every 6 hours  dextrose 5%. 1000 milliLiter(s) (50 mL/Hr) IV Continuous <Continuous>  dextrose 50% Injectable 12.5 Gram(s) IV Push once  dextrose 50% Injectable 25 Gram(s) IV Push once  dextrose 50% Injectable 25 Gram(s) IV Push once  enoxaparin Injectable 70 milliGRAM(s) SubCutaneous two times a day  folic acid 1 milliGRAM(s) Oral daily  insulin lispro (HumaLOG) corrective regimen sliding scale   SubCutaneous three times a day before meals  levothyroxine 75 MICROGram(s) Oral daily  metoprolol succinate ER 25 milliGRAM(s) Oral daily  pantoprazole    Tablet 40 milliGRAM(s) Oral before breakfast  simvastatin 20 milliGRAM(s) Oral at bedtime  tamsulosin 0.4 milliGRAM(s) Oral at bedtime    MEDICATIONS  (PRN):  acetaminophen   Tablet .. 650 milliGRAM(s) Oral every 4 hours PRN Temp greater or equal to 38.5C (101.3F)  acetaminophen  Suppository .. 650 milliGRAM(s) Rectal every 4 hours PRN Temp greater or equal to 38.5C (101.3F)  dextrose 40% Gel 15 Gram(s) Oral once PRN Blood Glucose LESS THAN 70 milliGRAM(s)/deciliter  glucagon  Injectable 1 milliGRAM(s) IntraMuscular once PRN Glucose LESS THAN 70 milligrams/deciliter      CAPILLARY BLOOD GLUCOSE      POCT Blood Glucose.: 121 mg/dL (13 Apr 2020 12:24)  POCT Blood Glucose.: 162 mg/dL (13 Apr 2020 08:31)  POCT Blood Glucose.: 94 mg/dL (12 Apr 2020 23:11)  POCT Blood Glucose.: 101 mg/dL (12 Apr 2020 16:58)    I&O's Summary    13 Apr 2020 07:01  -  13 Apr 2020 14:29  --------------------------------------------------------  IN: 120 mL / OUT: 0 mL / NET: 120 mL        PHYSICAL EXAM:  Vital Signs Last 24 Hrs  T(C): 36.9 (13 Apr 2020 13:25), Max: 36.9 (13 Apr 2020 04:35)  T(F): 98.4 (13 Apr 2020 13:25), Max: 98.5 (13 Apr 2020 04:35)  HR: 62 (13 Apr 2020 13:25) (62 - 89)  BP: 115/75 (13 Apr 2020 13:25) (115/75 - 127/80)  BP(mean): --  RR: 20 (13 Apr 2020 13:25) (20 - 20)  SpO2: 92% (13 Apr 2020 13:25) (82% - 94%)  Constitutional: NAD, elderly/frail  Eyes: EOMI, no scleral icterus or injection  ENMT: supple,   Respiratory: breathing comfortably   Cardiovascular: No LE edema   Gastrointestinal: Soft, NT/ND, +BS   Extremities: Warm and well perfused   Vascular: +DP/PT pulses   Neurological: AX0x3, non focal   Skin: unremarkable   Psychiatric: Normal mood and affect     LABS:                        13.7   9.67  )-----------( 359      ( 13 Apr 2020 05:35 )             42.4     04-13    140  |  103  |  40<H>  ----------------------------<  153<H>  4.4   |  18<L>  |  1.16    Ca    10.6<H>      13 Apr 2020 05:35  Phos  3.3     04-13  Mg     2.4     04-13    TPro  8.5<H>  /  Alb  3.2<L>  /  TBili  0.6  /  DBili  x   /  AST  33  /  ALT  33  /  AlkPhos  153<H>  04-13    PT/INR - ( 13 Apr 2020 05:35 )   PT: 27.7 SEC;   INR: 2.35          PTT - ( 13 Apr 2020 05:35 )  PTT:35.5 SEC          Culture - Urine (collected 11 Apr 2020 13:19)  Source: .Urine Clean Catch (Midstream)  Final Report (12 Apr 2020 11:07):    <10,000 CFU/mL Normal Urogenital Lorraine        RADIOLOGY & ADDITIONAL TESTS:  Results Reviewed:   Imaging Personally Reviewed:  Electrocardiogram Personally Reviewed:    COORDINATION OF CARE:  Care Discussed with Consultants/Other Providers [Y/N]: acp [place], taper O2 as tolerated , PT f/u  Prior or Outpatient Records Reviewed [Y/N]:

## 2020-04-13 NOTE — PROGRESS NOTE ADULT - ATTENDING COMMENTS
Ronnie Garvey MD  Attending Physician, Hospitalist Medicine   (p) [phone]    CPT code: 31886  Acute hypoxic respiratory failure  Metabolic encephalopathy   Acute PE  COVID 19 positive   HTN  HLD  Hypothyroid  preDM CPT code: 97152  Acute hypoxic respiratory failure  Metabolic encephalopathy   Acute PE  COVID 19 positive   HTN  HLD  Hypothyroid  preDM CPT code: 79928  Acute hypoxic respiratory failure  Metabolic encephalopathy   Acute PE  COVID 19 positive   HTN  HLD  Hypothyroid  Type 2 DM

## 2020-04-13 NOTE — PHYSICAL THERAPY INITIAL EVALUATION ADULT - GENERAL OBSERVATIONS, REHAB EVAL
Pt received semi-jones in bed, , NAD. Pt agreeable to PT consultation. Pt received semi-jones in bed, NAD. Pt agreeable to PT consultation. Cleared for PT as per RN.

## 2020-04-13 NOTE — PHYSICAL THERAPY INITIAL EVALUATION ADULT - GAIT DEVIATIONS NOTED, PT EVAL
decreased aaron/decreased weight-shifting ability/+BLE knee buckling/increased time in double stance

## 2020-04-13 NOTE — PHYSICAL THERAPY INITIAL EVALUATION ADULT - PERTINENT HX OF CURRENT PROBLEM, REHAB EVAL
80 y.o. male admitted for acute hypoxic respiratory failure possibly secondary to COVID-19 infection. He has radiographic evidence of bilateral PNA. +COVID 19. +PE.

## 2020-04-13 NOTE — PHYSICAL THERAPY INITIAL EVALUATION ADULT - ADDITIONAL COMMENTS
Pt lives in apartment with daughter. Daughter is . pt ambulated independently prior to admission. 12 steps to negotiate. No prior use of assistive device.     Pt left semi-jones in bed, NAD. +NRB. +call bell. RN aware of session.

## 2020-04-13 NOTE — PROGRESS NOTE ADULT - PROBLEM SELECTOR PLAN 4
Per chart review pt w/ preDM however on metformin at home. Blood sugars stable here  -Holding home metformin  -ISS for now and will adjust regimen accordingly   -A1c 6.8% Per chart review pt w/ preDM however on metformin at home. Blood sugars stable here  -Holding home metformin  -ISS for now and will adjust regimen accordingly   -A1c 6.8% is consistent with type 2 DM

## 2020-04-13 NOTE — PHYSICAL THERAPY INITIAL EVALUATION ADULT - LEVEL OF INDEPENDENCE: GAIT, REHAB EVAL
contact guard/however, secondary to fatigue and SOB, pt requires moderate assistance to safely return to supine

## 2020-04-13 NOTE — PHYSICAL THERAPY INITIAL EVALUATION ADULT - DISCHARGE DISPOSITION, PT EVAL
Recommend home with home care PT in order to address independence during ADLs and ambulation and decrease fall risk during all functional activities/home w/ home PT home w/ home PT/Anticipate home with home care PT in order to address independence during ADLs and ambulation and decrease fall risk during all functional activities. However, monitor pt progress and PT notes closely.

## 2020-04-14 PROCEDURE — 99233 SBSQ HOSP IP/OBS HIGH 50: CPT

## 2020-04-14 RX ADMIN — ENOXAPARIN SODIUM 70 MILLIGRAM(S): 100 INJECTION SUBCUTANEOUS at 05:51

## 2020-04-14 RX ADMIN — Medication 1 MILLIGRAM(S): at 11:38

## 2020-04-14 RX ADMIN — ALBUTEROL 2 PUFF(S): 90 AEROSOL, METERED ORAL at 04:19

## 2020-04-14 RX ADMIN — ALBUTEROL 2 PUFF(S): 90 AEROSOL, METERED ORAL at 21:10

## 2020-04-14 RX ADMIN — ALBUTEROL 2 PUFF(S): 90 AEROSOL, METERED ORAL at 11:38

## 2020-04-14 RX ADMIN — Medication 75 MICROGRAM(S): at 05:52

## 2020-04-14 RX ADMIN — PANTOPRAZOLE SODIUM 40 MILLIGRAM(S): 20 TABLET, DELAYED RELEASE ORAL at 07:31

## 2020-04-14 RX ADMIN — ALBUTEROL 2 PUFF(S): 90 AEROSOL, METERED ORAL at 17:14

## 2020-04-14 RX ADMIN — Medication 25 MILLIGRAM(S): at 05:52

## 2020-04-14 RX ADMIN — SIMVASTATIN 20 MILLIGRAM(S): 20 TABLET, FILM COATED ORAL at 21:10

## 2020-04-14 RX ADMIN — Medication 1: at 11:38

## 2020-04-14 RX ADMIN — ENOXAPARIN SODIUM 70 MILLIGRAM(S): 100 INJECTION SUBCUTANEOUS at 17:14

## 2020-04-14 RX ADMIN — TAMSULOSIN HYDROCHLORIDE 0.4 MILLIGRAM(S): 0.4 CAPSULE ORAL at 21:11

## 2020-04-14 NOTE — PROGRESS NOTE ADULT - SUBJECTIVE AND OBJECTIVE BOX
Dr. Tara Alcaraz  Pager [phone]    PROGRESS NOTE:     Patient is a 80y old  Male who presents with a chief complaint of The patient is a 80y Male complaining of shortness of breath (13 Apr 2020 14:28)      SUBJECTIVE / OVERNIGHT EVENTS: pt breathing ok on NC  ADDITIONAL REVIEW OF SYSTEMS: afebrile    MEDICATIONS  (STANDING):  ALBUTerol    90 MICROgram(s) HFA Inhaler 2 Puff(s) Inhalation every 6 hours  dextrose 5%. 1000 milliLiter(s) (50 mL/Hr) IV Continuous <Continuous>  dextrose 50% Injectable 12.5 Gram(s) IV Push once  dextrose 50% Injectable 25 Gram(s) IV Push once  dextrose 50% Injectable 25 Gram(s) IV Push once  enoxaparin Injectable 70 milliGRAM(s) SubCutaneous two times a day  folic acid 1 milliGRAM(s) Oral daily  insulin lispro (HumaLOG) corrective regimen sliding scale   SubCutaneous three times a day before meals  levothyroxine 75 MICROGram(s) Oral daily  metoprolol succinate ER 25 milliGRAM(s) Oral daily  pantoprazole    Tablet 40 milliGRAM(s) Oral before breakfast  simvastatin 20 milliGRAM(s) Oral at bedtime  tamsulosin 0.4 milliGRAM(s) Oral at bedtime    MEDICATIONS  (PRN):  acetaminophen   Tablet .. 650 milliGRAM(s) Oral every 4 hours PRN Temp greater or equal to 38.5C (101.3F)  acetaminophen  Suppository .. 650 milliGRAM(s) Rectal every 4 hours PRN Temp greater or equal to 38.5C (101.3F)  dextrose 40% Gel 15 Gram(s) Oral once PRN Blood Glucose LESS THAN 70 milliGRAM(s)/deciliter  glucagon  Injectable 1 milliGRAM(s) IntraMuscular once PRN Glucose LESS THAN 70 milligrams/deciliter      I&O's Summary      PHYSICAL EXAM:  vitals: temp 98.2F, HR 84, /66, RR 18, O2 sat 96% on 6L   Constitutional: NAD, elderly/frail  Eyes: EOMI, no scleral icterus or injection  ENMT: supple,   Respiratory: breathing comfortably   Cardiovascular: No LE edema   Gastrointestinal: Soft, NT/ND, +BS   Extremities: Warm and well perfused   Vascular: +DP/PT pulses   Neurological: AX0x3, non focal   Skin: unremarkable   Psychiatric: Normal mood and affect     LABS:                        13.3   6.09  )-----------( 300      ( 15 Apr 2020 05:24 )             41.2     04-15    139  |  102  |  37<H>  ----------------------------<  113<H>  4.4   |  23  |  1.09    Ca    10.2      15 Apr 2020 05:24  Phos  3.8     04-15  Mg     2.3     04-15    TPro  8.0  /  Alb  3.1<L>  /  TBili  0.6  /  DBili  x   /  AST  27  /  ALT  33  /  AlkPhos  157<H>  04-15          RADIOLOGY & ADDITIONAL TESTS:  Results Reviewed:   Imaging Personally Reviewed:  Electrocardiogram Personally Reviewed:    COORDINATION OF CARE:  Care Discussed with Consultants/Other Providers [Y/N]: acp, wean O2  Prior or Outpatient Records Reviewed [Y/N]:

## 2020-04-14 NOTE — PROGRESS NOTE ADULT - ATTENDING COMMENTS
CPT code: 88796  Acute hypoxic respiratory failure  Metabolic encephalopathy   Acute PE  COVID 19 positive   HTN  HLD  Hypothyroid  Type 2 DM

## 2020-04-14 NOTE — PROGRESS NOTE ADULT - PROBLEM SELECTOR PLAN 4
Per chart review pt w/ preDM however on metformin at home. Blood sugars stable here  -Holding home metformin  -ISS for now and will adjust regimen accordingly   -A1c 6.8% is consistent with type 2 DM

## 2020-04-15 LAB
ALBUMIN SERPL ELPH-MCNC: 3.1 G/DL — LOW (ref 3.3–5)
ALP SERPL-CCNC: 157 U/L — HIGH (ref 40–120)
ALT FLD-CCNC: 33 U/L — SIGNIFICANT CHANGE UP (ref 4–41)
ANION GAP SERPL CALC-SCNC: 14 MMO/L — SIGNIFICANT CHANGE UP (ref 7–14)
AST SERPL-CCNC: 27 U/L — SIGNIFICANT CHANGE UP (ref 4–40)
BASOPHILS # BLD AUTO: 0.04 K/UL — SIGNIFICANT CHANGE UP (ref 0–0.2)
BASOPHILS NFR BLD AUTO: 0.7 % — SIGNIFICANT CHANGE UP (ref 0–2)
BILIRUB SERPL-MCNC: 0.6 MG/DL — SIGNIFICANT CHANGE UP (ref 0.2–1.2)
BUN SERPL-MCNC: 37 MG/DL — HIGH (ref 7–23)
CALCIUM SERPL-MCNC: 10.2 MG/DL — SIGNIFICANT CHANGE UP (ref 8.4–10.5)
CHLORIDE SERPL-SCNC: 102 MMOL/L — SIGNIFICANT CHANGE UP (ref 98–107)
CO2 SERPL-SCNC: 23 MMOL/L — SIGNIFICANT CHANGE UP (ref 22–31)
CREAT SERPL-MCNC: 1.09 MG/DL — SIGNIFICANT CHANGE UP (ref 0.5–1.3)
EOSINOPHIL # BLD AUTO: 0.18 K/UL — SIGNIFICANT CHANGE UP (ref 0–0.5)
EOSINOPHIL NFR BLD AUTO: 3 % — SIGNIFICANT CHANGE UP (ref 0–6)
GLUCOSE SERPL-MCNC: 113 MG/DL — HIGH (ref 70–99)
HCT VFR BLD CALC: 41.2 % — SIGNIFICANT CHANGE UP (ref 39–50)
HGB BLD-MCNC: 13.3 G/DL — SIGNIFICANT CHANGE UP (ref 13–17)
IMM GRANULOCYTES NFR BLD AUTO: 0.5 % — SIGNIFICANT CHANGE UP (ref 0–1.5)
LYMPHOCYTES # BLD AUTO: 0.89 K/UL — LOW (ref 1–3.3)
LYMPHOCYTES # BLD AUTO: 14.6 % — SIGNIFICANT CHANGE UP (ref 13–44)
MAGNESIUM SERPL-MCNC: 2.3 MG/DL — SIGNIFICANT CHANGE UP (ref 1.6–2.6)
MCHC RBC-ENTMCNC: 28.6 PG — SIGNIFICANT CHANGE UP (ref 27–34)
MCHC RBC-ENTMCNC: 32.3 % — SIGNIFICANT CHANGE UP (ref 32–36)
MCV RBC AUTO: 88.6 FL — SIGNIFICANT CHANGE UP (ref 80–100)
MONOCYTES # BLD AUTO: 0.61 K/UL — SIGNIFICANT CHANGE UP (ref 0–0.9)
MONOCYTES NFR BLD AUTO: 10 % — SIGNIFICANT CHANGE UP (ref 2–14)
NEUTROPHILS # BLD AUTO: 4.34 K/UL — SIGNIFICANT CHANGE UP (ref 1.8–7.4)
NEUTROPHILS NFR BLD AUTO: 71.2 % — SIGNIFICANT CHANGE UP (ref 43–77)
NRBC # FLD: 0 K/UL — SIGNIFICANT CHANGE UP (ref 0–0)
PHOSPHATE SERPL-MCNC: 3.8 MG/DL — SIGNIFICANT CHANGE UP (ref 2.5–4.5)
PLATELET # BLD AUTO: 300 K/UL — SIGNIFICANT CHANGE UP (ref 150–400)
PMV BLD: 11.2 FL — SIGNIFICANT CHANGE UP (ref 7–13)
POTASSIUM SERPL-MCNC: 4.4 MMOL/L — SIGNIFICANT CHANGE UP (ref 3.5–5.3)
POTASSIUM SERPL-SCNC: 4.4 MMOL/L — SIGNIFICANT CHANGE UP (ref 3.5–5.3)
PROT SERPL-MCNC: 8 G/DL — SIGNIFICANT CHANGE UP (ref 6–8.3)
RBC # BLD: 4.65 M/UL — SIGNIFICANT CHANGE UP (ref 4.2–5.8)
RBC # FLD: 13.5 % — SIGNIFICANT CHANGE UP (ref 10.3–14.5)
SODIUM SERPL-SCNC: 139 MMOL/L — SIGNIFICANT CHANGE UP (ref 135–145)
WBC # BLD: 6.09 K/UL — SIGNIFICANT CHANGE UP (ref 3.8–10.5)
WBC # FLD AUTO: 6.09 K/UL — SIGNIFICANT CHANGE UP (ref 3.8–10.5)

## 2020-04-15 PROCEDURE — 99233 SBSQ HOSP IP/OBS HIGH 50: CPT

## 2020-04-15 RX ADMIN — Medication 1: at 11:29

## 2020-04-15 RX ADMIN — SIMVASTATIN 20 MILLIGRAM(S): 20 TABLET, FILM COATED ORAL at 20:45

## 2020-04-15 RX ADMIN — ALBUTEROL 2 PUFF(S): 90 AEROSOL, METERED ORAL at 16:57

## 2020-04-15 RX ADMIN — PANTOPRAZOLE SODIUM 40 MILLIGRAM(S): 20 TABLET, DELAYED RELEASE ORAL at 05:24

## 2020-04-15 RX ADMIN — Medication 75 MICROGRAM(S): at 05:23

## 2020-04-15 RX ADMIN — ALBUTEROL 2 PUFF(S): 90 AEROSOL, METERED ORAL at 11:29

## 2020-04-15 RX ADMIN — ALBUTEROL 2 PUFF(S): 90 AEROSOL, METERED ORAL at 20:45

## 2020-04-15 RX ADMIN — Medication 1 MILLIGRAM(S): at 11:29

## 2020-04-15 RX ADMIN — ENOXAPARIN SODIUM 70 MILLIGRAM(S): 100 INJECTION SUBCUTANEOUS at 16:57

## 2020-04-15 RX ADMIN — ALBUTEROL 2 PUFF(S): 90 AEROSOL, METERED ORAL at 05:22

## 2020-04-15 RX ADMIN — TAMSULOSIN HYDROCHLORIDE 0.4 MILLIGRAM(S): 0.4 CAPSULE ORAL at 20:45

## 2020-04-15 RX ADMIN — Medication 25 MILLIGRAM(S): at 05:23

## 2020-04-15 RX ADMIN — ENOXAPARIN SODIUM 70 MILLIGRAM(S): 100 INJECTION SUBCUTANEOUS at 05:24

## 2020-04-15 NOTE — PROGRESS NOTE ADULT - SUBJECTIVE AND OBJECTIVE BOX
Dr. Tara Alcaraz  Pager [phone]    PROGRESS NOTE:     Patient is a 80y old  Male who presents with a chief complaint of The patient is a 80y Male complaining of shortness of breath (14 Apr 2020 14:24)      SUBJECTIVE / OVERNIGHT EVENTS: Pt breathing ok, weaned O2 to 4L  ADDITIONAL REVIEW OF SYSTEMS: denies chest pain     MEDICATIONS  (STANDING):  ALBUTerol    90 MICROgram(s) HFA Inhaler 2 Puff(s) Inhalation every 6 hours  dextrose 5%. 1000 milliLiter(s) (50 mL/Hr) IV Continuous <Continuous>  dextrose 50% Injectable 12.5 Gram(s) IV Push once  dextrose 50% Injectable 25 Gram(s) IV Push once  dextrose 50% Injectable 25 Gram(s) IV Push once  enoxaparin Injectable 70 milliGRAM(s) SubCutaneous two times a day  folic acid 1 milliGRAM(s) Oral daily  insulin lispro (HumaLOG) corrective regimen sliding scale   SubCutaneous three times a day before meals  levothyroxine 75 MICROGram(s) Oral daily  metoprolol succinate ER 25 milliGRAM(s) Oral daily  pantoprazole    Tablet 40 milliGRAM(s) Oral before breakfast  simvastatin 20 milliGRAM(s) Oral at bedtime  tamsulosin 0.4 milliGRAM(s) Oral at bedtime    MEDICATIONS  (PRN):  acetaminophen   Tablet .. 650 milliGRAM(s) Oral every 4 hours PRN Temp greater or equal to 38.5C (101.3F)  acetaminophen  Suppository .. 650 milliGRAM(s) Rectal every 4 hours PRN Temp greater or equal to 38.5C (101.3F)  dextrose 40% Gel 15 Gram(s) Oral once PRN Blood Glucose LESS THAN 70 milliGRAM(s)/deciliter  glucagon  Injectable 1 milliGRAM(s) IntraMuscular once PRN Glucose LESS THAN 70 milligrams/deciliter      CAPILLARY BLOOD GLUCOSE      POCT Blood Glucose.: 154 mg/dL (15 Apr 2020 11:26)  POCT Blood Glucose.: 131 mg/dL (15 Apr 2020 08:33)  POCT Blood Glucose.: 159 mg/dL (14 Apr 2020 21:58)  POCT Blood Glucose.: 96 mg/dL (14 Apr 2020 17:05)    I&O's Summary      PHYSICAL EXAM:  Vital Signs Last 24 Hrs  T(C): 37.1 (15 Apr 2020 12:26), Max: 37.1 (15 Apr 2020 12:26)  T(F): 98.8 (15 Apr 2020 12:26), Max: 98.8 (15 Apr 2020 12:26)  HR: 88 (15 Apr 2020 12:26) (62 - 88)  BP: 139/83 (15 Apr 2020 12:26) (123/76 - 139/88)  BP(mean): --  RR: 20 (15 Apr 2020 12:26) (20 - 20)  SpO2: 96% (15 Apr 2020 12:26) (96% - 99%)  Constitutional: NAD, elderly/frail  Eyes: EOMI, no scleral icterus or injection  ENMT: supple,   Respiratory: breathing comfortably   Cardiovascular: No LE edema   Gastrointestinal: Soft, NT/ND, +BS   Extremities: Warm and well perfused   Vascular: +DP/PT pulses   Neurological: AX0x3, non focal   Skin: unremarkable   Psychiatric: Normal mood and affect     LABS:                        13.3   6.09  )-----------( 300      ( 15 Apr 2020 05:24 )             41.2     04-15    139  |  102  |  37<H>  ----------------------------<  113<H>  4.4   |  23  |  1.09    Ca    10.2      15 Apr 2020 05:24  Phos  3.8     04-15  Mg     2.3     04-15    TPro  8.0  /  Alb  3.1<L>  /  TBili  0.6  /  DBili  x   /  AST  27  /  ALT  33  /  AlkPhos  157<H>  04-15      RADIOLOGY & ADDITIONAL TESTS:  Results Reviewed:   Imaging Personally Reviewed:  Electrocardiogram Personally Reviewed:    COORDINATION OF CARE:  Care Discussed with Consultants/Other Providers [Y/N]: Alivia green, wean O2, f/u CM about home O2  Prior or Outpatient Records Reviewed [Y/N]:

## 2020-04-15 NOTE — PROGRESS NOTE ADULT - ATTENDING COMMENTS
CPT code: 24314  Acute hypoxic respiratory failure  Metabolic encephalopathy   Acute PE  COVID 19 positive   HTN  HLD  Hypothyroid  Type 2 DM

## 2020-04-16 LAB
ALBUMIN SERPL ELPH-MCNC: 2.9 G/DL — LOW (ref 3.3–5)
ALP SERPL-CCNC: 144 U/L — HIGH (ref 40–120)
ALT FLD-CCNC: 39 U/L — SIGNIFICANT CHANGE UP (ref 4–41)
ANION GAP SERPL CALC-SCNC: 14 MMO/L — SIGNIFICANT CHANGE UP (ref 7–14)
AST SERPL-CCNC: 31 U/L — SIGNIFICANT CHANGE UP (ref 4–40)
BASOPHILS # BLD AUTO: 0.02 K/UL — SIGNIFICANT CHANGE UP (ref 0–0.2)
BASOPHILS NFR BLD AUTO: 0.3 % — SIGNIFICANT CHANGE UP (ref 0–2)
BILIRUB SERPL-MCNC: 0.5 MG/DL — SIGNIFICANT CHANGE UP (ref 0.2–1.2)
BUN SERPL-MCNC: 40 MG/DL — HIGH (ref 7–23)
CALCIUM SERPL-MCNC: 10.2 MG/DL — SIGNIFICANT CHANGE UP (ref 8.4–10.5)
CHLORIDE SERPL-SCNC: 106 MMOL/L — SIGNIFICANT CHANGE UP (ref 98–107)
CO2 SERPL-SCNC: 19 MMOL/L — LOW (ref 22–31)
CREAT SERPL-MCNC: 1.08 MG/DL — SIGNIFICANT CHANGE UP (ref 0.5–1.3)
EOSINOPHIL # BLD AUTO: 0.08 K/UL — SIGNIFICANT CHANGE UP (ref 0–0.5)
EOSINOPHIL NFR BLD AUTO: 1.2 % — SIGNIFICANT CHANGE UP (ref 0–6)
GLUCOSE BLDC GLUCOMTR-MCNC: 100 MG/DL — HIGH (ref 70–99)
GLUCOSE BLDC GLUCOMTR-MCNC: 121 MG/DL — HIGH (ref 70–99)
GLUCOSE BLDC GLUCOMTR-MCNC: 130 MG/DL — HIGH (ref 70–99)
GLUCOSE SERPL-MCNC: 163 MG/DL — HIGH (ref 70–99)
HCT VFR BLD CALC: 41 % — SIGNIFICANT CHANGE UP (ref 39–50)
HGB BLD-MCNC: 13.1 G/DL — SIGNIFICANT CHANGE UP (ref 13–17)
IMM GRANULOCYTES NFR BLD AUTO: 0.5 % — SIGNIFICANT CHANGE UP (ref 0–1.5)
LYMPHOCYTES # BLD AUTO: 0.8 K/UL — LOW (ref 1–3.3)
LYMPHOCYTES # BLD AUTO: 12.3 % — LOW (ref 13–44)
MAGNESIUM SERPL-MCNC: 2.6 MG/DL — SIGNIFICANT CHANGE UP (ref 1.6–2.6)
MCHC RBC-ENTMCNC: 28 PG — SIGNIFICANT CHANGE UP (ref 27–34)
MCHC RBC-ENTMCNC: 32 % — SIGNIFICANT CHANGE UP (ref 32–36)
MCV RBC AUTO: 87.6 FL — SIGNIFICANT CHANGE UP (ref 80–100)
MONOCYTES # BLD AUTO: 0.63 K/UL — SIGNIFICANT CHANGE UP (ref 0–0.9)
MONOCYTES NFR BLD AUTO: 9.7 % — SIGNIFICANT CHANGE UP (ref 2–14)
NEUTROPHILS # BLD AUTO: 4.96 K/UL — SIGNIFICANT CHANGE UP (ref 1.8–7.4)
NEUTROPHILS NFR BLD AUTO: 76 % — SIGNIFICANT CHANGE UP (ref 43–77)
NRBC # FLD: 0 K/UL — SIGNIFICANT CHANGE UP (ref 0–0)
PHOSPHATE SERPL-MCNC: 3.8 MG/DL — SIGNIFICANT CHANGE UP (ref 2.5–4.5)
PLATELET # BLD AUTO: 292 K/UL — SIGNIFICANT CHANGE UP (ref 150–400)
PMV BLD: 10.5 FL — SIGNIFICANT CHANGE UP (ref 7–13)
POTASSIUM SERPL-MCNC: 4.3 MMOL/L — SIGNIFICANT CHANGE UP (ref 3.5–5.3)
POTASSIUM SERPL-SCNC: 4.3 MMOL/L — SIGNIFICANT CHANGE UP (ref 3.5–5.3)
PROT SERPL-MCNC: 8.8 G/DL — HIGH (ref 6–8.3)
RBC # BLD: 4.68 M/UL — SIGNIFICANT CHANGE UP (ref 4.2–5.8)
RBC # FLD: 13.4 % — SIGNIFICANT CHANGE UP (ref 10.3–14.5)
SODIUM SERPL-SCNC: 139 MMOL/L — SIGNIFICANT CHANGE UP (ref 135–145)
WBC # BLD: 6.52 K/UL — SIGNIFICANT CHANGE UP (ref 3.8–10.5)
WBC # FLD AUTO: 6.52 K/UL — SIGNIFICANT CHANGE UP (ref 3.8–10.5)

## 2020-04-16 PROCEDURE — 99233 SBSQ HOSP IP/OBS HIGH 50: CPT

## 2020-04-16 RX ADMIN — ENOXAPARIN SODIUM 70 MILLIGRAM(S): 100 INJECTION SUBCUTANEOUS at 04:43

## 2020-04-16 RX ADMIN — ALBUTEROL 2 PUFF(S): 90 AEROSOL, METERED ORAL at 04:44

## 2020-04-16 RX ADMIN — ENOXAPARIN SODIUM 70 MILLIGRAM(S): 100 INJECTION SUBCUTANEOUS at 17:15

## 2020-04-16 RX ADMIN — PANTOPRAZOLE SODIUM 40 MILLIGRAM(S): 20 TABLET, DELAYED RELEASE ORAL at 04:43

## 2020-04-16 RX ADMIN — SIMVASTATIN 20 MILLIGRAM(S): 20 TABLET, FILM COATED ORAL at 22:06

## 2020-04-16 RX ADMIN — TAMSULOSIN HYDROCHLORIDE 0.4 MILLIGRAM(S): 0.4 CAPSULE ORAL at 22:06

## 2020-04-16 RX ADMIN — Medication 1 MILLIGRAM(S): at 17:15

## 2020-04-16 RX ADMIN — Medication 25 MILLIGRAM(S): at 04:43

## 2020-04-16 RX ADMIN — ALBUTEROL 2 PUFF(S): 90 AEROSOL, METERED ORAL at 12:11

## 2020-04-16 RX ADMIN — ALBUTEROL 2 PUFF(S): 90 AEROSOL, METERED ORAL at 22:06

## 2020-04-16 RX ADMIN — Medication 75 MICROGRAM(S): at 04:43

## 2020-04-16 NOTE — PROGRESS NOTE ADULT - SUBJECTIVE AND OBJECTIVE BOX
Patient is a 80y old  Male who presents with a chief complaint of hypoxia, acute PE (15 Apr 2020 13:28)      SUBJECTIVE / OVERNIGHT EVENTS: No overnight event. This morning, pt not responding to ROS questions due to respiratory distress.    MEDICATIONS  (STANDING):  ALBUTerol    90 MICROgram(s) HFA Inhaler 2 Puff(s) Inhalation every 6 hours  dextrose 5%. 1000 milliLiter(s) (50 mL/Hr) IV Continuous <Continuous>  dextrose 50% Injectable 12.5 Gram(s) IV Push once  dextrose 50% Injectable 25 Gram(s) IV Push once  dextrose 50% Injectable 25 Gram(s) IV Push once  enoxaparin Injectable 70 milliGRAM(s) SubCutaneous two times a day  folic acid 1 milliGRAM(s) Oral daily  insulin lispro (HumaLOG) corrective regimen sliding scale   SubCutaneous three times a day before meals  levothyroxine 75 MICROGram(s) Oral daily  metoprolol succinate ER 25 milliGRAM(s) Oral daily  pantoprazole    Tablet 40 milliGRAM(s) Oral before breakfast  simvastatin 20 milliGRAM(s) Oral at bedtime  tamsulosin 0.4 milliGRAM(s) Oral at bedtime    MEDICATIONS  (PRN):  acetaminophen   Tablet .. 650 milliGRAM(s) Oral every 4 hours PRN Temp greater or equal to 38.5C (101.3F)  acetaminophen  Suppository .. 650 milliGRAM(s) Rectal every 4 hours PRN Temp greater or equal to 38.5C (101.3F)  dextrose 40% Gel 15 Gram(s) Oral once PRN Blood Glucose LESS THAN 70 milliGRAM(s)/deciliter  glucagon  Injectable 1 milliGRAM(s) IntraMuscular once PRN Glucose LESS THAN 70 milligrams/deciliter      CAPILLARY BLOOD GLUCOSE      POCT Blood Glucose.: 130 mg/dL (16 Apr 2020 11:58)  POCT Blood Glucose.: 100 mg/dL (16 Apr 2020 07:45)  POCT Blood Glucose.: 118 mg/dL (15 Apr 2020 21:01)  POCT Blood Glucose.: 116 mg/dL (15 Apr 2020 16:37)    I&O's Summary      PHYSICAL EXAM:  Vital Signs Last 24 Hrs  T(C): 36.7 (16 Apr 2020 04:36), Max: 37.1 (15 Apr 2020 12:26)  T(F): 98 (16 Apr 2020 04:36), Max: 98.8 (15 Apr 2020 12:26)  HR: 92 (16 Apr 2020 04:36) (84 - 92)  BP: 138/74 (16 Apr 2020 04:36) (131/87 - 139/83)  BP(mean): --  RR: 20 (16 Apr 2020 04:36) (20 - 20)  SpO2: 98% (16 Apr 2020 04:36) (96% - 98%)  CONSTITUTIONAL: some respiratory distress  ENMT: Moist oral mucosa, no pharyngeal injection or exudates; normal dentition  RESPIRATORY: coarse BS b/l  CARDIOVASCULAR: Regular rate and rhythm, normal S1 and S2, no murmur/rub/gallop; No lower extremity edema; Peripheral pulses are 2+ bilaterally  ABDOMEN: Nontender to palpation, normoactive bowel sounds, no rebound/guarding; No hepatosplenomegaly    LABS:                        13.1   6.52  )-----------( 292      ( 16 Apr 2020 09:50 )             41.0     04-16    139  |  106  |  40<H>  ----------------------------<  163<H>  4.3   |  19<L>  |  1.08    Ca    10.2      16 Apr 2020 09:50  Phos  3.8     04-16  Mg     2.6     04-16    TPro  8.8<H>  /  Alb  2.9<L>  /  TBili  0.5  /  DBili  x   /  AST  31  /  ALT  39  /  AlkPhos  144<H>  04-16                RADIOLOGY & ADDITIONAL TESTS:  Results Reviewed:   Imaging Personally Reviewed:  Electrocardiogram Personally Reviewed:    COORDINATION OF CARE:  Care Discussed with Consultants/Other Providers [Y/N]:  Prior or Outpatient Records Reviewed [Y/N]:

## 2020-04-17 LAB
ALBUMIN SERPL ELPH-MCNC: 3.2 G/DL — LOW (ref 3.3–5)
ALP SERPL-CCNC: 152 U/L — HIGH (ref 40–120)
ALT FLD-CCNC: 44 U/L — HIGH (ref 4–41)
ANION GAP SERPL CALC-SCNC: 18 MMO/L — HIGH (ref 7–14)
AST SERPL-CCNC: 39 U/L — SIGNIFICANT CHANGE UP (ref 4–40)
BASOPHILS # BLD AUTO: 0.03 K/UL — SIGNIFICANT CHANGE UP (ref 0–0.2)
BASOPHILS NFR BLD AUTO: 0.4 % — SIGNIFICANT CHANGE UP (ref 0–2)
BILIRUB SERPL-MCNC: 0.5 MG/DL — SIGNIFICANT CHANGE UP (ref 0.2–1.2)
BUN SERPL-MCNC: 42 MG/DL — HIGH (ref 7–23)
CALCIUM SERPL-MCNC: 10.5 MG/DL — SIGNIFICANT CHANGE UP (ref 8.4–10.5)
CHLORIDE SERPL-SCNC: 105 MMOL/L — SIGNIFICANT CHANGE UP (ref 98–107)
CO2 SERPL-SCNC: 19 MMOL/L — LOW (ref 22–31)
CREAT SERPL-MCNC: 1.16 MG/DL — SIGNIFICANT CHANGE UP (ref 0.5–1.3)
CRP SERPL-MCNC: 76.6 MG/L — HIGH
EOSINOPHIL # BLD AUTO: 0.12 K/UL — SIGNIFICANT CHANGE UP (ref 0–0.5)
EOSINOPHIL NFR BLD AUTO: 1.8 % — SIGNIFICANT CHANGE UP (ref 0–6)
FERRITIN SERPL-MCNC: 1021 NG/ML — HIGH (ref 30–400)
GLUCOSE BLDC GLUCOMTR-MCNC: 108 MG/DL — HIGH (ref 70–99)
GLUCOSE BLDC GLUCOMTR-MCNC: 116 MG/DL — HIGH (ref 70–99)
GLUCOSE BLDC GLUCOMTR-MCNC: 127 MG/DL — HIGH (ref 70–99)
GLUCOSE SERPL-MCNC: 117 MG/DL — HIGH (ref 70–99)
HCT VFR BLD CALC: 43.6 % — SIGNIFICANT CHANGE UP (ref 39–50)
HGB BLD-MCNC: 13.7 G/DL — SIGNIFICANT CHANGE UP (ref 13–17)
IMM GRANULOCYTES NFR BLD AUTO: 0.4 % — SIGNIFICANT CHANGE UP (ref 0–1.5)
LYMPHOCYTES # BLD AUTO: 0.9 K/UL — LOW (ref 1–3.3)
LYMPHOCYTES # BLD AUTO: 13.1 % — SIGNIFICANT CHANGE UP (ref 13–44)
MCHC RBC-ENTMCNC: 28.2 PG — SIGNIFICANT CHANGE UP (ref 27–34)
MCHC RBC-ENTMCNC: 31.4 % — LOW (ref 32–36)
MCV RBC AUTO: 89.7 FL — SIGNIFICANT CHANGE UP (ref 80–100)
MONOCYTES # BLD AUTO: 0.67 K/UL — SIGNIFICANT CHANGE UP (ref 0–0.9)
MONOCYTES NFR BLD AUTO: 9.8 % — SIGNIFICANT CHANGE UP (ref 2–14)
NEUTROPHILS # BLD AUTO: 5.1 K/UL — SIGNIFICANT CHANGE UP (ref 1.8–7.4)
NEUTROPHILS NFR BLD AUTO: 74.5 % — SIGNIFICANT CHANGE UP (ref 43–77)
NRBC # FLD: 0 K/UL — SIGNIFICANT CHANGE UP (ref 0–0)
PLATELET # BLD AUTO: 349 K/UL — SIGNIFICANT CHANGE UP (ref 150–400)
PMV BLD: 11.4 FL — SIGNIFICANT CHANGE UP (ref 7–13)
POTASSIUM SERPL-MCNC: 5.1 MMOL/L — SIGNIFICANT CHANGE UP (ref 3.5–5.3)
POTASSIUM SERPL-SCNC: 5.1 MMOL/L — SIGNIFICANT CHANGE UP (ref 3.5–5.3)
PROCALCITONIN SERPL-MCNC: 0.28 NG/ML — HIGH (ref 0.02–0.1)
PROT SERPL-MCNC: 8.6 G/DL — HIGH (ref 6–8.3)
RBC # BLD: 4.86 M/UL — SIGNIFICANT CHANGE UP (ref 4.2–5.8)
RBC # FLD: 13.5 % — SIGNIFICANT CHANGE UP (ref 10.3–14.5)
SODIUM SERPL-SCNC: 142 MMOL/L — SIGNIFICANT CHANGE UP (ref 135–145)
WBC # BLD: 6.85 K/UL — SIGNIFICANT CHANGE UP (ref 3.8–10.5)
WBC # FLD AUTO: 6.85 K/UL — SIGNIFICANT CHANGE UP (ref 3.8–10.5)

## 2020-04-17 PROCEDURE — 99233 SBSQ HOSP IP/OBS HIGH 50: CPT

## 2020-04-17 RX ADMIN — ALBUTEROL 2 PUFF(S): 90 AEROSOL, METERED ORAL at 17:06

## 2020-04-17 RX ADMIN — ALBUTEROL 2 PUFF(S): 90 AEROSOL, METERED ORAL at 23:47

## 2020-04-17 RX ADMIN — ALBUTEROL 2 PUFF(S): 90 AEROSOL, METERED ORAL at 05:46

## 2020-04-17 RX ADMIN — Medication 25 MILLIGRAM(S): at 05:16

## 2020-04-17 RX ADMIN — PANTOPRAZOLE SODIUM 40 MILLIGRAM(S): 20 TABLET, DELAYED RELEASE ORAL at 05:16

## 2020-04-17 RX ADMIN — Medication 1 MILLIGRAM(S): at 12:14

## 2020-04-17 RX ADMIN — ALBUTEROL 2 PUFF(S): 90 AEROSOL, METERED ORAL at 11:11

## 2020-04-17 RX ADMIN — SIMVASTATIN 20 MILLIGRAM(S): 20 TABLET, FILM COATED ORAL at 23:47

## 2020-04-17 RX ADMIN — ENOXAPARIN SODIUM 70 MILLIGRAM(S): 100 INJECTION SUBCUTANEOUS at 05:15

## 2020-04-17 RX ADMIN — TAMSULOSIN HYDROCHLORIDE 0.4 MILLIGRAM(S): 0.4 CAPSULE ORAL at 23:47

## 2020-04-17 RX ADMIN — ENOXAPARIN SODIUM 70 MILLIGRAM(S): 100 INJECTION SUBCUTANEOUS at 17:06

## 2020-04-17 RX ADMIN — Medication 75 MICROGRAM(S): at 05:16

## 2020-04-17 NOTE — PROGRESS NOTE ADULT - SUBJECTIVE AND OBJECTIVE BOX
Patient is a 80y old  Male who presents with a chief complaint of The patient is a 80y Male complaining of shortness of breath (16 Apr 2020 12:08)      SUBJECTIVE / OVERNIGHT EVENTS: Pt feels SOB is steadily decreasing.    MEDICATIONS  (STANDING):  ALBUTerol    90 MICROgram(s) HFA Inhaler 2 Puff(s) Inhalation every 6 hours  dextrose 5%. 1000 milliLiter(s) (50 mL/Hr) IV Continuous <Continuous>  dextrose 50% Injectable 12.5 Gram(s) IV Push once  dextrose 50% Injectable 25 Gram(s) IV Push once  dextrose 50% Injectable 25 Gram(s) IV Push once  enoxaparin Injectable 70 milliGRAM(s) SubCutaneous two times a day  folic acid 1 milliGRAM(s) Oral daily  insulin lispro (HumaLOG) corrective regimen sliding scale   SubCutaneous three times a day before meals  levothyroxine 75 MICROGram(s) Oral daily  metoprolol succinate ER 25 milliGRAM(s) Oral daily  pantoprazole    Tablet 40 milliGRAM(s) Oral before breakfast  simvastatin 20 milliGRAM(s) Oral at bedtime  tamsulosin 0.4 milliGRAM(s) Oral at bedtime    MEDICATIONS  (PRN):  acetaminophen   Tablet .. 650 milliGRAM(s) Oral every 4 hours PRN Temp greater or equal to 38.5C (101.3F)  acetaminophen  Suppository .. 650 milliGRAM(s) Rectal every 4 hours PRN Temp greater or equal to 38.5C (101.3F)  dextrose 40% Gel 15 Gram(s) Oral once PRN Blood Glucose LESS THAN 70 milliGRAM(s)/deciliter  glucagon  Injectable 1 milliGRAM(s) IntraMuscular once PRN Glucose LESS THAN 70 milligrams/deciliter      CAPILLARY BLOOD GLUCOSE      POCT Blood Glucose.: 116 mg/dL (17 Apr 2020 12:16)  POCT Blood Glucose.: 108 mg/dL (17 Apr 2020 08:24)  POCT Blood Glucose.: 100 mg/dL (16 Apr 2020 22:18)  POCT Blood Glucose.: 121 mg/dL (16 Apr 2020 17:01)    I&O's Summary      PHYSICAL EXAM:  Vital Signs Last 24 Hrs  T(C): 36.6 (17 Apr 2020 05:12), Max: 37 (17 Apr 2020 03:18)  T(F): 97.8 (17 Apr 2020 05:12), Max: 98.6 (17 Apr 2020 03:18)  HR: 81 (17 Apr 2020 05:12) (81 - 96)  BP: 112/73 (17 Apr 2020 05:12) (112/73 - 125/60)  BP(mean): --  RR: 18 (17 Apr 2020 05:12) (18 - 18)  SpO2: 100% (17 Apr 2020 05:12) (94% - 100%)  CONSTITUTIONAL: NAD, well-developed, well-groomed  ENMT: Moist oral mucosa, no pharyngeal injection or exudates; normal dentition  RESPIRATORY: Normal respiratory effort; lungs are clear to auscultation bilaterally  CARDIOVASCULAR: Regular rate and rhythm, normal S1 and S2, no murmur/rub/gallop; No lower extremity edema; Peripheral pulses are 2+ bilaterally  ABDOMEN: Nontender to palpation, normoactive bowel sounds, no rebound/guarding; No hepatosplenomegaly      LABS:                        13.7   6.85  )-----------( 349      ( 17 Apr 2020 05:50 )             43.6     04-17    142  |  105  |  42<H>  ----------------------------<  117<H>  5.1   |  19<L>  |  1.16    Ca    10.5      17 Apr 2020 05:50  Phos  3.8     04-16  Mg     2.6     04-16    TPro  8.6<H>  /  Alb  3.2<L>  /  TBili  0.5  /  DBili  x   /  AST  39  /  ALT  44<H>  /  AlkPhos  152<H>  04-17                RADIOLOGY & ADDITIONAL TESTS:  Results Reviewed:   Imaging Personally Reviewed:  Electrocardiogram Personally Reviewed:    COORDINATION OF CARE:  Care Discussed with Consultants/Other Providers [Y/N]:  Prior or Outpatient Records Reviewed [Y/N]:

## 2020-04-18 ENCOUNTER — TRANSCRIPTION ENCOUNTER (OUTPATIENT)
Age: 80
End: 2020-04-18

## 2020-04-18 LAB
GLUCOSE BLDC GLUCOMTR-MCNC: 138 MG/DL — HIGH (ref 70–99)
GLUCOSE BLDC GLUCOMTR-MCNC: 94 MG/DL — SIGNIFICANT CHANGE UP (ref 70–99)
GLUCOSE BLDC GLUCOMTR-MCNC: 98 MG/DL — SIGNIFICANT CHANGE UP (ref 70–99)

## 2020-04-18 PROCEDURE — 99233 SBSQ HOSP IP/OBS HIGH 50: CPT

## 2020-04-18 RX ORDER — RIVAROXABAN 15 MG-20MG
1 KIT ORAL
Qty: 1 | Refills: 0
Start: 2020-04-18

## 2020-04-18 RX ORDER — APIXABAN 2.5 MG/1
1 TABLET, FILM COATED ORAL
Qty: 1 | Refills: 0
Start: 2020-04-18

## 2020-04-18 RX ADMIN — ALBUTEROL 2 PUFF(S): 90 AEROSOL, METERED ORAL at 10:09

## 2020-04-18 RX ADMIN — Medication 1 MILLIGRAM(S): at 19:10

## 2020-04-18 RX ADMIN — PANTOPRAZOLE SODIUM 40 MILLIGRAM(S): 20 TABLET, DELAYED RELEASE ORAL at 07:27

## 2020-04-18 RX ADMIN — ENOXAPARIN SODIUM 70 MILLIGRAM(S): 100 INJECTION SUBCUTANEOUS at 07:27

## 2020-04-18 RX ADMIN — Medication 25 MILLIGRAM(S): at 07:27

## 2020-04-18 RX ADMIN — ENOXAPARIN SODIUM 70 MILLIGRAM(S): 100 INJECTION SUBCUTANEOUS at 19:10

## 2020-04-18 RX ADMIN — Medication 75 MICROGRAM(S): at 07:27

## 2020-04-18 NOTE — DISCHARGE NOTE PROVIDER - NSDCCPCAREPLAN_GEN_ALL_CORE_FT
PRINCIPAL DISCHARGE DIAGNOSIS  Diagnosis: COVID-19  Assessment and Plan of Treatment: You were found to be positive COVID 19 virus. Please follow full instructions listed in your paperwork. Please self quarantine at home for 14 days from discharge and stay 6 feet away minimum from other individuals or animals. Do not go to work, school, public areas, or use public transportaion. Restrict outside activity except for  medical care. Please call ahead if you have an appointment with your doctor to inform them of your COVID positive status. Always wear a facemask at home. Cover your sneezes and coughs, and wash hands with soap and water for at least 20 seconds frequently. Avoid sharing personal household items. Clean all surfaces where you contact frequently such as coutners and doorknobs frequently.     If you have any worsening breathing, faster breathing or trouble with breathing call 911 immediately or your healthcare provider ahead of time and wear facemask before being seen by medical personel.   Take tylenol for your fevers. Please follow state and local rules and regulations regarding coming off of isolation.      SECONDARY DISCHARGE DIAGNOSES  Diagnosis: Delirium  Assessment and Plan of Treatment: You were admitted to the hospital with confusion and a change in behavior. Your CT scan of the head was unremarkable. Such change was likely from low oxygen levels. Upon discharge please continue your prescribed medications and follow-up with your primary care provider as outpatient for ongoing care. If you or your family members notice a state of confusion, slurred speech, change in behavior, or seizure-like activity you should return to the emergency room.    Diagnosis: Pre-diabetes  Assessment and Plan of Treatment: Continue Metformin. Monitor for signs/symptoms of low blood glucose, such as, dizziness, altered mental status, or cool/clammy skin. In addition, monitor for signs/symptoms of high blood glucose, such as, feeling hot, dry, fatigued, or with increased thirst/urination. Make regular podiatry appointments in order to have feet checked for wounds and uncontrolled toe nail growth to prevent infections, as well as, appointments with an ophthalmologist to monitor your vision. PRINCIPAL DISCHARGE DIAGNOSIS  Diagnosis: COVID-19  Assessment and Plan of Treatment: You were found to be positive COVID 19 virus. Please follow full instructions listed in your paperwork. Please self quarantine at home for 14 days from discharge and stay 6 feet away minimum from other individuals or animals. Do not go to work, school, public areas, or use public transportaion. Restrict outside activity except for  medical care. Please call ahead if you have an appointment with your doctor to inform them of your COVID positive status. Always wear a facemask at home. Cover your sneezes and coughs, and wash hands with soap and water for at least 20 seconds frequently. Avoid sharing personal household items. Clean all surfaces where you contact frequently such as coutners and doorknobs frequently.     If you have any worsening breathing, faster breathing or trouble with breathing call 911 immediately or your healthcare provider ahead of time and wear facemask before being seen by medical personel.   Take tylenol for your fevers. Please follow state and local rules and regulations regarding coming off of isolation.      SECONDARY DISCHARGE DIAGNOSES  Diagnosis: Pulmonary embolism  Assessment and Plan of Treatment: You were found to have a blood clot in your lung. This may have been caused by COVID 19 infection.  Please continue blood thinner as prescribed at time of discharge. Follow up with your doctors to determine duration of blood thinner therapy.    Diagnosis: Delirium  Assessment and Plan of Treatment: You were admitted to the hospital with confusion and a change in behavior. Your CT scan of the head was unremarkable. Such change was likely from low oxygen levels. Upon discharge please continue your prescribed medications and follow-up with your primary care provider as outpatient for ongoing care. If you or your family members notice a state of confusion, slurred speech, change in behavior, or seizure-like activity you should return to the emergency room. PRINCIPAL DISCHARGE DIAGNOSIS  Diagnosis: COVID-19  Assessment and Plan of Treatment: 80M h/o HTN/HLD recently treated w/ Plaquenil/Azithromycin outpatient for COVID19 presenting to ED with shortness of breath found to be in hypoxic respiratory failure and imaging consistent with infection; Placed on supplemental oxygen during admission and weaned to room air prior to discharge. Patient is saturating adequately and found to have a NEW PE on CTA w/ RUL segmental pulmonary artery embolism. Patient was started on Therapeutic Lovenox with transition to Eliquis 10 mg BID for 7 days and 5 mg BID thereafter. As per Dr Taylor, patient is stable and ready for discharge to rehab.  Patient should remain in isolation for a total of 14 days from time of discharge  Patient was diagnosed on 4/11/2020 and would be expected to complete isolation on or after 4/25/2020  Those caring for the patient should maintain strict hand hygiene and avoid touching face as much as possible  .      SECONDARY DISCHARGE DIAGNOSES  Diagnosis: Pulmonary embolism  Assessment and Plan of Treatment: You were found to have a blood clot in your lung. This may have been caused by COVID 19 infection.  Please continue blood thinner as prescribed at time of discharge. Follow up with your doctors to determine duration of blood thinner therapy.    Diagnosis: Delirium  Assessment and Plan of Treatment: You were admitted to the hospital with confusion and a change in behavior. Your CT scan of the head was unremarkable. Such change was likely from low oxygen levels. Upon discharge please continue your prescribed medications and follow-up with your primary care provider as outpatient for ongoing care. If you or your family members notice a state of confusion, slurred speech, change in behavior, or seizure-like activity you should return to the emergency room.

## 2020-04-18 NOTE — CHART NOTE - NSCHARTNOTEFT_GEN_A_CORE
Spoke to patient's daughter about patient desaturating to 80 upon ambulation - Will reassess O2 tomorrow - Called CM as patient lives with daughter and patient has 15 steps to walk up to enter home - May require home O2 if not improving and also being discharged on new medication - CM to reach out to daughter as patient likely benefit from home care

## 2020-04-18 NOTE — DISCHARGE NOTE PROVIDER - NSDCMRMEDTOKEN_GEN_ALL_CORE_FT
Eliquis Starter Pack for Treatment of DVT and PE 5 mg oral tablet: DO NOT FILL - Please check insurance and page [phone]   folic acid 1 mg oral tablet: 1 tab(s) orally once a day  levothyroxine 75 mcg (0.075 mg) oral tablet: 1 tab(s) orally once a day  metFORMIN 500 mg oral tablet: 500 milligram(s) orally once a day  metoprolol succinate 25 mg oral tablet, extended release: 1 tab(s) orally once a day  pantoprazole 40 mg oral delayed release tablet: 1 tab(s) orally once a day  simvastatin 20 mg oral tablet: 1 tab(s) orally once a day (at bedtime)  tamsulosin 0.4 mg oral capsule: 1 cap(s) orally once a day Eliquis Starter Pack for Treatment of DVT and PE 5 mg oral tablet: DO NOT FILL - Please check insurance and page [phone]   folic acid 1 mg oral tablet: 1 tab(s) orally once a day  levothyroxine 75 mcg (0.075 mg) oral tablet: 1 tab(s) orally once a day  metFORMIN 500 mg oral tablet: 500 milligram(s) orally once a day  metoprolol succinate 25 mg oral tablet, extended release: 1 tab(s) orally once a day  pantoprazole 40 mg oral delayed release tablet: 1 tab(s) orally once a day  simvastatin 20 mg oral tablet: 1 tab(s) orally once a day (at bedtime)  tamsulosin 0.4 mg oral capsule: 1 cap(s) orally once a day  Xarelto Starter Pack 15 mg-20 mg oral kit: PAGE [phone] for coverage please acetaminophen 325 mg oral tablet: 2 tab(s) orally every 4 hours, As needed, Temp greater or equal to 38.5C (101.3F)  Eliquis: 10 milligram(s) orally 2 times a day    take for a total of 7 days.   Eliquis 5 mg oral tablet: 1 tab(s) orally 2 times a day    to begin after 7 days of Eliquis 10 mg BID complete   folic acid 1 mg oral tablet: 1 tab(s) orally once a day  levothyroxine 75 mcg (0.075 mg) oral tablet: 1 tab(s) orally once a day  metFORMIN 500 mg oral tablet: 500 milligram(s) orally once a day  metoprolol succinate 25 mg oral tablet, extended release: 1 tab(s) orally once a day  pantoprazole 40 mg oral delayed release tablet: 1 tab(s) orally once a day  simvastatin 20 mg oral tablet: 1 tab(s) orally once a day (at bedtime)  tamsulosin 0.4 mg oral capsule: 1 cap(s) orally once a day (at bedtime)

## 2020-04-18 NOTE — PROGRESS NOTE ADULT - SUBJECTIVE AND OBJECTIVE BOX
Dr. Tara Alcaraz  Pager [phone]    PROGRESS NOTE:     Patient is a 80y old  Male who presents with a chief complaint of The patient is a 80y Male complaining of shortness of breath (18 Apr 2020 07:50)      SUBJECTIVE / OVERNIGHT EVENTS: pt breathing better, sating 97% on RA, states he lives at home with his daughter  ADDITIONAL REVIEW OF SYSTEMS: afebrile     MEDICATIONS  (STANDING):  ALBUTerol    90 MICROgram(s) HFA Inhaler 2 Puff(s) Inhalation every 6 hours  dextrose 5%. 1000 milliLiter(s) (50 mL/Hr) IV Continuous <Continuous>  dextrose 50% Injectable 12.5 Gram(s) IV Push once  dextrose 50% Injectable 25 Gram(s) IV Push once  dextrose 50% Injectable 25 Gram(s) IV Push once  enoxaparin Injectable 70 milliGRAM(s) SubCutaneous two times a day  folic acid 1 milliGRAM(s) Oral daily  insulin lispro (HumaLOG) corrective regimen sliding scale   SubCutaneous three times a day before meals  levothyroxine 75 MICROGram(s) Oral daily  metoprolol succinate ER 25 milliGRAM(s) Oral daily  pantoprazole    Tablet 40 milliGRAM(s) Oral before breakfast  simvastatin 20 milliGRAM(s) Oral at bedtime  tamsulosin 0.4 milliGRAM(s) Oral at bedtime    MEDICATIONS  (PRN):  acetaminophen   Tablet .. 650 milliGRAM(s) Oral every 4 hours PRN Temp greater or equal to 38.5C (101.3F)  acetaminophen  Suppository .. 650 milliGRAM(s) Rectal every 4 hours PRN Temp greater or equal to 38.5C (101.3F)  dextrose 40% Gel 15 Gram(s) Oral once PRN Blood Glucose LESS THAN 70 milliGRAM(s)/deciliter  glucagon  Injectable 1 milliGRAM(s) IntraMuscular once PRN Glucose LESS THAN 70 milligrams/deciliter      CAPILLARY BLOOD GLUCOSE      POCT Blood Glucose.: 138 mg/dL (18 Apr 2020 12:02)  POCT Blood Glucose.: 98 mg/dL (18 Apr 2020 08:13)  POCT Blood Glucose.: 127 mg/dL (17 Apr 2020 16:50)    I&O's Summary      PHYSICAL EXAM:  Vital Signs Last 24 Hrs  T(C): 36.6 (18 Apr 2020 10:26), Max: 37 (17 Apr 2020 22:33)  T(F): 97.8 (18 Apr 2020 10:26), Max: 98.6 (17 Apr 2020 22:33)  HR: 76 (18 Apr 2020 10:26) (75 - 80)  BP: 106/76 (18 Apr 2020 10:26) (100/78 - 122/76)  BP(mean): --  RR: 18 (18 Apr 2020 10:26) (18 - 18)  SpO2: 97% (18 Apr 2020 10:26) (92% - 98%)  CONSTITUTIONAL: NAD, well-developed, well-groomed  ENMT: Moist oral mucosa, no pharyngeal injection or exudates; normal dentition  RESPIRATORY: Normal respiratory effort; lungs are clear to auscultation bilaterally  CARDIOVASCULAR: Regular rate and rhythm, normal S1 and S2, no murmur/rub/gallop; No lower extremity edema; Peripheral pulses are 2+ bilaterally  ABDOMEN: Nontender to palpation, normoactive bowel sounds, no rebound/guarding; No hepatosplenomegaly      LABS:                        13.7   6.85  )-----------( 349      ( 17 Apr 2020 05:50 )             43.6     04-17    142  |  105  |  42<H>  ----------------------------<  117<H>  5.1   |  19<L>  |  1.16    Ca    10.5      17 Apr 2020 05:50    TPro  8.6<H>  /  Alb  3.2<L>  /  TBili  0.5  /  DBili  x   /  AST  39  /  ALT  44<H>  /  AlkPhos  152<H>  04-17      RADIOLOGY & ADDITIONAL TESTS:  Results Reviewed:   Imaging Personally Reviewed:    Electrocardiogram Personally Reviewed:    COORDINATION OF CARE:  Care Discussed with Consultants/Other Providers [Y/N]: yovana Cavazos home today with home care  Prior or Outpatient Records Reviewed [Y/N]:

## 2020-04-18 NOTE — DISCHARGE NOTE PROVIDER - HOSPITAL COURSE
80M h/o HTN/HLD recently treated w/ Plaquenil/Azithromycin outpatient for COVID19 presenting with shortness of breath found to be in hypoxic respiratory failure and imaging consistent with infection; Placed on NC O2 saturating adequately found to have a NEE PE CTA w/ RUL segmental pulmonary artery embolism started on therapeutic Lovenox will DC on DOAC        Dispo - 80M h/o HTN/HLD recently treated w/ Plaquenil/Azithromycin outpatient for COVID19 presenting with shortness of breath found to be in hypoxic respiratory failure and imaging consistent with infection; Placed on NC O2 saturating adequately found to have a NEW PE CTA w/ RUL segmental pulmonary artery embolism started on therapeutic Lovenox with transition to DOAC on discharge. 80M h/o HTN/HLD recently treated w/ Plaquenil/Azithromycin outpatient for COVID19 presenting to ED with shortness of breath found to be in hypoxic respiratory failure and imaging consistent with infection; Placed on supplemental oxygen during admission and weaned to room air prior to discharge. Patient is saturating adequately and found to have a NEW PE on CTA w/ RUL segmental pulmonary artery embolism. Patient was started on Therapeutic Lovenox with transition to Eliquis 10 mg BID for 7 days and 5 mg BID thereafter. As per Dr Taylor, patient is stable and ready for discharge to rehab.        Patient should remain in isolation for a total of 14 days from time of discharge        Patient was diagnosed on 4/11/2020 and would be expected to complete isolation on or after 4/25/2020        Those caring for the patient should maintain strict hand hygiene and avoid touching face as much as possible

## 2020-04-18 NOTE — PROGRESS NOTE ADULT - ATTENDING COMMENTS
Principal diagnosis B34.2  [id number]    Time spent on discharge 34 minutes coordinating discharge plan and discussing with patient and family.

## 2020-04-18 NOTE — DISCHARGE NOTE PROVIDER - CARE PROVIDER_API CALL
[facility] [facility])  Cardiology; Internal Medicine  [address] [place] [address], [address]  [place] [address]  Phone: [phone]  Fax: [phone]  Follow Up Time:

## 2020-04-19 LAB
ANION GAP SERPL CALC-SCNC: 11 MMO/L — SIGNIFICANT CHANGE UP (ref 7–14)
BASOPHILS # BLD AUTO: 0.03 K/UL — SIGNIFICANT CHANGE UP (ref 0–0.2)
BASOPHILS NFR BLD AUTO: 0.6 % — SIGNIFICANT CHANGE UP (ref 0–2)
BUN SERPL-MCNC: 34 MG/DL — HIGH (ref 7–23)
CALCIUM SERPL-MCNC: 10 MG/DL — SIGNIFICANT CHANGE UP (ref 8.4–10.5)
CHLORIDE SERPL-SCNC: 102 MMOL/L — SIGNIFICANT CHANGE UP (ref 98–107)
CO2 SERPL-SCNC: 22 MMOL/L — SIGNIFICANT CHANGE UP (ref 22–31)
CREAT SERPL-MCNC: 1.11 MG/DL — SIGNIFICANT CHANGE UP (ref 0.5–1.3)
CRP SERPL-MCNC: 30.7 MG/L — HIGH
EOSINOPHIL # BLD AUTO: 0.16 K/UL — SIGNIFICANT CHANGE UP (ref 0–0.5)
EOSINOPHIL NFR BLD AUTO: 3.2 % — SIGNIFICANT CHANGE UP (ref 0–6)
FERRITIN SERPL-MCNC: 898.1 NG/ML — HIGH (ref 30–400)
GLUCOSE BLDC GLUCOMTR-MCNC: 100 MG/DL — HIGH (ref 70–99)
GLUCOSE BLDC GLUCOMTR-MCNC: 105 MG/DL — HIGH (ref 70–99)
GLUCOSE BLDC GLUCOMTR-MCNC: 112 MG/DL — HIGH (ref 70–99)
GLUCOSE BLDC GLUCOMTR-MCNC: 138 MG/DL — HIGH (ref 70–99)
GLUCOSE BLDC GLUCOMTR-MCNC: 171 MG/DL — HIGH (ref 70–99)
GLUCOSE SERPL-MCNC: 113 MG/DL — HIGH (ref 70–99)
HCT VFR BLD CALC: 42 % — SIGNIFICANT CHANGE UP (ref 39–50)
HGB BLD-MCNC: 13.3 G/DL — SIGNIFICANT CHANGE UP (ref 13–17)
IMM GRANULOCYTES NFR BLD AUTO: 0.2 % — SIGNIFICANT CHANGE UP (ref 0–1.5)
LDH SERPL L TO P-CCNC: 171 U/L — SIGNIFICANT CHANGE UP (ref 135–225)
LYMPHOCYTES # BLD AUTO: 1.14 K/UL — SIGNIFICANT CHANGE UP (ref 1–3.3)
LYMPHOCYTES # BLD AUTO: 22.6 % — SIGNIFICANT CHANGE UP (ref 13–44)
MAGNESIUM SERPL-MCNC: 2.2 MG/DL — SIGNIFICANT CHANGE UP (ref 1.6–2.6)
MCHC RBC-ENTMCNC: 27.9 PG — SIGNIFICANT CHANGE UP (ref 27–34)
MCHC RBC-ENTMCNC: 31.7 % — LOW (ref 32–36)
MCV RBC AUTO: 88.2 FL — SIGNIFICANT CHANGE UP (ref 80–100)
MONOCYTES # BLD AUTO: 0.64 K/UL — SIGNIFICANT CHANGE UP (ref 0–0.9)
MONOCYTES NFR BLD AUTO: 12.7 % — SIGNIFICANT CHANGE UP (ref 2–14)
NEUTROPHILS # BLD AUTO: 3.06 K/UL — SIGNIFICANT CHANGE UP (ref 1.8–7.4)
NEUTROPHILS NFR BLD AUTO: 60.7 % — SIGNIFICANT CHANGE UP (ref 43–77)
NRBC # FLD: 0 K/UL — SIGNIFICANT CHANGE UP (ref 0–0)
PHOSPHATE SERPL-MCNC: 3 MG/DL — SIGNIFICANT CHANGE UP (ref 2.5–4.5)
PLATELET # BLD AUTO: 329 K/UL — SIGNIFICANT CHANGE UP (ref 150–400)
PMV BLD: 11 FL — SIGNIFICANT CHANGE UP (ref 7–13)
POTASSIUM SERPL-MCNC: 4.1 MMOL/L — SIGNIFICANT CHANGE UP (ref 3.5–5.3)
POTASSIUM SERPL-SCNC: 4.1 MMOL/L — SIGNIFICANT CHANGE UP (ref 3.5–5.3)
PROCALCITONIN SERPL-MCNC: 0.27 NG/ML — HIGH (ref 0.02–0.1)
RBC # BLD: 4.76 M/UL — SIGNIFICANT CHANGE UP (ref 4.2–5.8)
RBC # FLD: 13.3 % — SIGNIFICANT CHANGE UP (ref 10.3–14.5)
SODIUM SERPL-SCNC: 135 MMOL/L — SIGNIFICANT CHANGE UP (ref 135–145)
WBC # BLD: 5.04 K/UL — SIGNIFICANT CHANGE UP (ref 3.8–10.5)
WBC # FLD AUTO: 5.04 K/UL — SIGNIFICANT CHANGE UP (ref 3.8–10.5)

## 2020-04-19 PROCEDURE — 99233 SBSQ HOSP IP/OBS HIGH 50: CPT

## 2020-04-19 RX ADMIN — ALBUTEROL 2 PUFF(S): 90 AEROSOL, METERED ORAL at 09:53

## 2020-04-19 RX ADMIN — PANTOPRAZOLE SODIUM 40 MILLIGRAM(S): 20 TABLET, DELAYED RELEASE ORAL at 06:03

## 2020-04-19 RX ADMIN — ALBUTEROL 2 PUFF(S): 90 AEROSOL, METERED ORAL at 16:03

## 2020-04-19 RX ADMIN — ALBUTEROL 2 PUFF(S): 90 AEROSOL, METERED ORAL at 00:19

## 2020-04-19 RX ADMIN — TAMSULOSIN HYDROCHLORIDE 0.4 MILLIGRAM(S): 0.4 CAPSULE ORAL at 22:07

## 2020-04-19 RX ADMIN — Medication 1: at 12:33

## 2020-04-19 RX ADMIN — ENOXAPARIN SODIUM 70 MILLIGRAM(S): 100 INJECTION SUBCUTANEOUS at 16:04

## 2020-04-19 RX ADMIN — Medication 1 MILLIGRAM(S): at 16:04

## 2020-04-19 RX ADMIN — SIMVASTATIN 20 MILLIGRAM(S): 20 TABLET, FILM COATED ORAL at 22:07

## 2020-04-19 RX ADMIN — ENOXAPARIN SODIUM 70 MILLIGRAM(S): 100 INJECTION SUBCUTANEOUS at 06:01

## 2020-04-19 RX ADMIN — ALBUTEROL 2 PUFF(S): 90 AEROSOL, METERED ORAL at 22:26

## 2020-04-19 RX ADMIN — Medication 25 MILLIGRAM(S): at 06:02

## 2020-04-19 RX ADMIN — Medication 75 MICROGRAM(S): at 06:02

## 2020-04-19 RX ADMIN — SIMVASTATIN 20 MILLIGRAM(S): 20 TABLET, FILM COATED ORAL at 00:19

## 2020-04-19 RX ADMIN — ALBUTEROL 2 PUFF(S): 90 AEROSOL, METERED ORAL at 06:05

## 2020-04-19 RX ADMIN — TAMSULOSIN HYDROCHLORIDE 0.4 MILLIGRAM(S): 0.4 CAPSULE ORAL at 00:19

## 2020-04-19 NOTE — PROGRESS NOTE ADULT - SUBJECTIVE AND OBJECTIVE BOX
Dr. Tara Alcaraz  Pager [phone]    PROGRESS NOTE:     Patient is a 80y old  Male who presents with a chief complaint of The patient is a 80y Male complaining of shortness of breath (18 Apr 2020 13:28)      SUBJECTIVE / OVERNIGHT EVENTS: pt denies chest pain or sob  ADDITIONAL REVIEW OF SYSTEMS: afebrile , feels weak     MEDICATIONS  (STANDING):  ALBUTerol    90 MICROgram(s) HFA Inhaler 2 Puff(s) Inhalation every 6 hours  dextrose 5%. 1000 milliLiter(s) (50 mL/Hr) IV Continuous <Continuous>  dextrose 50% Injectable 12.5 Gram(s) IV Push once  dextrose 50% Injectable 25 Gram(s) IV Push once  dextrose 50% Injectable 25 Gram(s) IV Push once  enoxaparin Injectable 70 milliGRAM(s) SubCutaneous two times a day  folic acid 1 milliGRAM(s) Oral daily  insulin lispro (HumaLOG) corrective regimen sliding scale   SubCutaneous three times a day before meals  levothyroxine 75 MICROGram(s) Oral daily  metoprolol succinate ER 25 milliGRAM(s) Oral daily  pantoprazole    Tablet 40 milliGRAM(s) Oral before breakfast  simvastatin 20 milliGRAM(s) Oral at bedtime  tamsulosin 0.4 milliGRAM(s) Oral at bedtime    MEDICATIONS  (PRN):  acetaminophen   Tablet .. 650 milliGRAM(s) Oral every 4 hours PRN Temp greater or equal to 38.5C (101.3F)  acetaminophen  Suppository .. 650 milliGRAM(s) Rectal every 4 hours PRN Temp greater or equal to 38.5C (101.3F)  dextrose 40% Gel 15 Gram(s) Oral once PRN Blood Glucose LESS THAN 70 milliGRAM(s)/deciliter  glucagon  Injectable 1 milliGRAM(s) IntraMuscular once PRN Glucose LESS THAN 70 milligrams/deciliter      CAPILLARY BLOOD GLUCOSE  171 (19 Apr 2020 12:08)      POCT Blood Glucose.: 171 mg/dL (19 Apr 2020 11:56)  POCT Blood Glucose.: 112 mg/dL (19 Apr 2020 08:09)  POCT Blood Glucose.: 94 mg/dL (18 Apr 2020 16:57)    I&O's Summary      PHYSICAL EXAM:  Vital Signs Last 24 Hrs  T(C): 36.4 (19 Apr 2020 09:55), Max: 36.4 (18 Apr 2020 22:00)  T(F): 97.5 (19 Apr 2020 09:55), Max: 97.6 (18 Apr 2020 22:00)  HR: 81 (19 Apr 2020 09:55) (71 - 87)  BP: 104/56 (19 Apr 2020 09:55) (104/56 - 119/74)  BP(mean): --  RR: 16 (19 Apr 2020 09:55) (16 - 20)  SpO2: 95% (19 Apr 2020 09:55) (80% - 98%)  CONSTITUTIONAL: NAD, cachectic, thin man  ENMT: Moist oral mucosa, no pharyngeal injection or exudates; normal dentition  RESPIRATORY: Normal respiratory effort; lungs are clear to auscultation bilaterally  CARDIOVASCULAR: Regular rate and rhythm, normal S1 and S2, no murmur/rub/gallop; No lower extremity edema; Peripheral pulses are 2+ bilaterally  ABDOMEN: Nontender to palpation, normoactive bowel sounds, no rebound/guarding; No hepatosplenomegaly    LABS:                        13.3   5.04  )-----------( 329      ( 19 Apr 2020 06:50 )             42.0     04-19    135  |  102  |  34<H>  ----------------------------<  113<H>  4.1   |  22  |  1.11    Ca    10.0      19 Apr 2020 06:50  Phos  3.0     04-19  Mg     2.2     04-19        RADIOLOGY & ADDITIONAL TESTS:  Results Reviewed:   Imaging Personally Reviewed:  Electrocardiogram Personally Reviewed:    COORDINATION OF CARE:  Care Discussed with Consultants/Other Providers [Y/N]:  Prior or Outpatient Records Reviewed [Y/N]:

## 2020-04-19 NOTE — PROGRESS NOTE ADULT - PROBLEM SELECTOR PLAN 4
-pt has DM2  -resume home metformin on discharge  -ISS for now and will adjust regimen accordingly   -A1c 6.8%

## 2020-04-20 LAB
GLUCOSE BLDC GLUCOMTR-MCNC: 102 MG/DL — HIGH (ref 70–99)
GLUCOSE BLDC GLUCOMTR-MCNC: 105 MG/DL — HIGH (ref 70–99)
GLUCOSE BLDC GLUCOMTR-MCNC: 114 MG/DL — HIGH (ref 70–99)

## 2020-04-20 PROCEDURE — 99233 SBSQ HOSP IP/OBS HIGH 50: CPT

## 2020-04-20 RX ADMIN — Medication 25 MILLIGRAM(S): at 07:31

## 2020-04-20 RX ADMIN — ALBUTEROL 2 PUFF(S): 90 AEROSOL, METERED ORAL at 22:23

## 2020-04-20 RX ADMIN — SIMVASTATIN 20 MILLIGRAM(S): 20 TABLET, FILM COATED ORAL at 22:22

## 2020-04-20 RX ADMIN — ENOXAPARIN SODIUM 70 MILLIGRAM(S): 100 INJECTION SUBCUTANEOUS at 06:31

## 2020-04-20 RX ADMIN — PANTOPRAZOLE SODIUM 40 MILLIGRAM(S): 20 TABLET, DELAYED RELEASE ORAL at 07:31

## 2020-04-20 RX ADMIN — ALBUTEROL 2 PUFF(S): 90 AEROSOL, METERED ORAL at 10:48

## 2020-04-20 RX ADMIN — Medication 1 MILLIGRAM(S): at 10:30

## 2020-04-20 RX ADMIN — ALBUTEROL 2 PUFF(S): 90 AEROSOL, METERED ORAL at 04:31

## 2020-04-20 RX ADMIN — TAMSULOSIN HYDROCHLORIDE 0.4 MILLIGRAM(S): 0.4 CAPSULE ORAL at 22:22

## 2020-04-20 RX ADMIN — ALBUTEROL 2 PUFF(S): 90 AEROSOL, METERED ORAL at 16:33

## 2020-04-20 RX ADMIN — ENOXAPARIN SODIUM 70 MILLIGRAM(S): 100 INJECTION SUBCUTANEOUS at 16:34

## 2020-04-20 RX ADMIN — Medication 75 MICROGRAM(S): at 07:31

## 2020-04-20 NOTE — PROGRESS NOTE ADULT - SUBJECTIVE AND OBJECTIVE BOX
Patient is a 80y old  Male who presents with a chief complaint of The patient is a 80y Male complaining of shortness of breath (19 Apr 2020 12:45)      SUBJECTIVE / OVERNIGHT EVENTS: Pt generally feeling well, no SOB. Pt eager to go home.    MEDICATIONS  (STANDING):  ALBUTerol    90 MICROgram(s) HFA Inhaler 2 Puff(s) Inhalation every 6 hours  dextrose 5%. 1000 milliLiter(s) (50 mL/Hr) IV Continuous <Continuous>  dextrose 50% Injectable 12.5 Gram(s) IV Push once  dextrose 50% Injectable 25 Gram(s) IV Push once  dextrose 50% Injectable 25 Gram(s) IV Push once  enoxaparin Injectable 70 milliGRAM(s) SubCutaneous two times a day  folic acid 1 milliGRAM(s) Oral daily  insulin lispro (HumaLOG) corrective regimen sliding scale   SubCutaneous three times a day before meals  levothyroxine 75 MICROGram(s) Oral daily  metoprolol succinate ER 25 milliGRAM(s) Oral daily  pantoprazole    Tablet 40 milliGRAM(s) Oral before breakfast  simvastatin 20 milliGRAM(s) Oral at bedtime  tamsulosin 0.4 milliGRAM(s) Oral at bedtime    MEDICATIONS  (PRN):  acetaminophen   Tablet .. 650 milliGRAM(s) Oral every 4 hours PRN Temp greater or equal to 38.5C (101.3F)  acetaminophen  Suppository .. 650 milliGRAM(s) Rectal every 4 hours PRN Temp greater or equal to 38.5C (101.3F)  dextrose 40% Gel 15 Gram(s) Oral once PRN Blood Glucose LESS THAN 70 milliGRAM(s)/deciliter  glucagon  Injectable 1 milliGRAM(s) IntraMuscular once PRN Glucose LESS THAN 70 milligrams/deciliter      CAPILLARY BLOOD GLUCOSE      POCT Blood Glucose.: 102 mg/dL (20 Apr 2020 11:45)  POCT Blood Glucose.: 114 mg/dL (20 Apr 2020 08:54)  POCT Blood Glucose.: 105 mg/dL (19 Apr 2020 22:19)  POCT Blood Glucose.: 138 mg/dL (19 Apr 2020 17:04)    I&O's Summary      PHYSICAL EXAM:  Vital Signs Last 24 Hrs  T(C): 36.7 (20 Apr 2020 11:56), Max: 36.7 (20 Apr 2020 11:56)  T(F): 98 (20 Apr 2020 11:56), Max: 98 (20 Apr 2020 11:56)  HR: 72 (20 Apr 2020 11:56) (72 - 85)  BP: 115/81 (20 Apr 2020 11:56) (115/81 - 122/75)  BP(mean): --  RR: 24 (20 Apr 2020 11:57) (18 - 24)  SpO2: 87% (20 Apr 2020 11:57) (87% - 97%)  CONSTITUTIONAL: NAD, well-developed, well-groomed  ENMT: Moist oral mucosa, no pharyngeal injection or exudates; normal dentition  RESPIRATORY: Normal respiratory effort; lungs are clear to auscultation bilaterally  CARDIOVASCULAR: Regular rate and rhythm, normal S1 and S2, no murmur/rub/gallop; No lower extremity edema; Peripheral pulses are 2+ bilaterally  ABDOMEN: Nontender to palpation, normoactive bowel sounds, no rebound/guarding; No hepatosplenomegaly      LABS:                        13.3   5.04  )-----------( 329      ( 19 Apr 2020 06:50 )             42.0     04-19    135  |  102  |  34<H>  ----------------------------<  113<H>  4.1   |  22  |  1.11    Ca    10.0      19 Apr 2020 06:50  Phos  3.0     04-19  Mg     2.2     04-19                  RADIOLOGY & ADDITIONAL TESTS:  Results Reviewed:   Imaging Personally Reviewed:  Electrocardiogram Personally Reviewed:    COORDINATION OF CARE:  Care Discussed with Consultants/Other Providers [Y/N]:  Prior or Outpatient Records Reviewed [Y/N]:

## 2020-04-21 ENCOUNTER — TRANSCRIPTION ENCOUNTER (OUTPATIENT)
Age: 80
End: 2020-04-21

## 2020-04-21 VITALS
OXYGEN SATURATION: 94 % | RESPIRATION RATE: 22 BRPM | HEART RATE: 95 BPM | DIASTOLIC BLOOD PRESSURE: 76 MMHG | TEMPERATURE: 98 F | SYSTOLIC BLOOD PRESSURE: 114 MMHG

## 2020-04-21 LAB
ANION GAP SERPL CALC-SCNC: 13 MMO/L — SIGNIFICANT CHANGE UP (ref 7–14)
BASOPHILS # BLD AUTO: 0.04 K/UL — SIGNIFICANT CHANGE UP (ref 0–0.2)
BASOPHILS NFR BLD AUTO: 1 % — SIGNIFICANT CHANGE UP (ref 0–2)
BUN SERPL-MCNC: 31 MG/DL — HIGH (ref 7–23)
CALCIUM SERPL-MCNC: 10.4 MG/DL — SIGNIFICANT CHANGE UP (ref 8.4–10.5)
CHLORIDE SERPL-SCNC: 104 MMOL/L — SIGNIFICANT CHANGE UP (ref 98–107)
CO2 SERPL-SCNC: 22 MMOL/L — SIGNIFICANT CHANGE UP (ref 22–31)
CREAT SERPL-MCNC: 1.02 MG/DL — SIGNIFICANT CHANGE UP (ref 0.5–1.3)
EOSINOPHIL # BLD AUTO: 0.14 K/UL — SIGNIFICANT CHANGE UP (ref 0–0.5)
EOSINOPHIL NFR BLD AUTO: 3.3 % — SIGNIFICANT CHANGE UP (ref 0–6)
GLUCOSE BLDC GLUCOMTR-MCNC: 104 MG/DL — HIGH (ref 70–99)
GLUCOSE BLDC GLUCOMTR-MCNC: 126 MG/DL — HIGH (ref 70–99)
GLUCOSE SERPL-MCNC: 104 MG/DL — HIGH (ref 70–99)
HCT VFR BLD CALC: 43.6 % — SIGNIFICANT CHANGE UP (ref 39–50)
HGB BLD-MCNC: 14.1 G/DL — SIGNIFICANT CHANGE UP (ref 13–17)
IMM GRANULOCYTES NFR BLD AUTO: 0.2 % — SIGNIFICANT CHANGE UP (ref 0–1.5)
LYMPHOCYTES # BLD AUTO: 0.81 K/UL — LOW (ref 1–3.3)
LYMPHOCYTES # BLD AUTO: 19.3 % — SIGNIFICANT CHANGE UP (ref 13–44)
MAGNESIUM SERPL-MCNC: 2.3 MG/DL — SIGNIFICANT CHANGE UP (ref 1.6–2.6)
MCHC RBC-ENTMCNC: 28.6 PG — SIGNIFICANT CHANGE UP (ref 27–34)
MCHC RBC-ENTMCNC: 32.3 % — SIGNIFICANT CHANGE UP (ref 32–36)
MCV RBC AUTO: 88.4 FL — SIGNIFICANT CHANGE UP (ref 80–100)
MONOCYTES # BLD AUTO: 0.55 K/UL — SIGNIFICANT CHANGE UP (ref 0–0.9)
MONOCYTES NFR BLD AUTO: 13.1 % — SIGNIFICANT CHANGE UP (ref 2–14)
NEUTROPHILS # BLD AUTO: 2.64 K/UL — SIGNIFICANT CHANGE UP (ref 1.8–7.4)
NEUTROPHILS NFR BLD AUTO: 63.1 % — SIGNIFICANT CHANGE UP (ref 43–77)
NRBC # FLD: 0 K/UL — SIGNIFICANT CHANGE UP (ref 0–0)
PHOSPHATE SERPL-MCNC: 3 MG/DL — SIGNIFICANT CHANGE UP (ref 2.5–4.5)
PLATELET # BLD AUTO: 303 K/UL — SIGNIFICANT CHANGE UP (ref 150–400)
PMV BLD: 11.3 FL — SIGNIFICANT CHANGE UP (ref 7–13)
POTASSIUM SERPL-MCNC: 4.5 MMOL/L — SIGNIFICANT CHANGE UP (ref 3.5–5.3)
POTASSIUM SERPL-SCNC: 4.5 MMOL/L — SIGNIFICANT CHANGE UP (ref 3.5–5.3)
RBC # BLD: 4.93 M/UL — SIGNIFICANT CHANGE UP (ref 4.2–5.8)
RBC # FLD: 13.2 % — SIGNIFICANT CHANGE UP (ref 10.3–14.5)
SODIUM SERPL-SCNC: 139 MMOL/L — SIGNIFICANT CHANGE UP (ref 135–145)
WBC # BLD: 4.19 K/UL — SIGNIFICANT CHANGE UP (ref 3.8–10.5)
WBC # FLD AUTO: 4.19 K/UL — SIGNIFICANT CHANGE UP (ref 3.8–10.5)

## 2020-04-21 PROCEDURE — 99239 HOSP IP/OBS DSCHRG MGMT >30: CPT

## 2020-04-21 RX ORDER — TAMSULOSIN HYDROCHLORIDE 0.4 MG/1
1 CAPSULE ORAL
Qty: 0 | Refills: 0 | DISCHARGE

## 2020-04-21 RX ORDER — TAMSULOSIN HYDROCHLORIDE 0.4 MG/1
1 CAPSULE ORAL
Qty: 0 | Refills: 0 | DISCHARGE
Start: 2020-04-21

## 2020-04-21 RX ORDER — ACETAMINOPHEN 500 MG
2 TABLET ORAL
Qty: 0 | Refills: 0 | DISCHARGE
Start: 2020-04-21

## 2020-04-21 RX ORDER — APIXABAN 2.5 MG/1
10 TABLET, FILM COATED ORAL
Qty: 0 | Refills: 0 | DISCHARGE
Start: 2020-04-21 | End: 2020-04-28

## 2020-04-21 RX ADMIN — ALBUTEROL 2 PUFF(S): 90 AEROSOL, METERED ORAL at 11:58

## 2020-04-21 RX ADMIN — Medication 1 MILLIGRAM(S): at 12:18

## 2020-04-21 RX ADMIN — PANTOPRAZOLE SODIUM 40 MILLIGRAM(S): 20 TABLET, DELAYED RELEASE ORAL at 05:03

## 2020-04-21 RX ADMIN — ENOXAPARIN SODIUM 70 MILLIGRAM(S): 100 INJECTION SUBCUTANEOUS at 05:02

## 2020-04-21 RX ADMIN — Medication 25 MILLIGRAM(S): at 05:03

## 2020-04-21 RX ADMIN — Medication 75 MICROGRAM(S): at 05:02

## 2020-04-21 NOTE — PROGRESS NOTE ADULT - PROBLEM SELECTOR PLAN 5
Continue home dose of pantoprazole
Continue home dose of pantoprazole
c/w protonix
Continue home dose of pantoprazole

## 2020-04-21 NOTE — PROGRESS NOTE ADULT - REASON FOR ADMISSION
The patient is a 80y Male complaining of shortness of breath
hypoxia, acute PE

## 2020-04-21 NOTE — PROGRESS NOTE ADULT - PROBLEM SELECTOR PLAN 10
DVT PPx: Lovenox    Dispo: Pending improvement in O2 requirements. PT is recommending home with home care.
DVT PPx: Lovenox    Dispo: discharge home today with home care
DVT PPx: Lovenox    Dispo: discharge pending PT reeval and ambulatory O2 sat >90%
DVT PPx: Lovenox    Dispo: discharge to [facility]
DVT PPx: Lovenox    Dispo: discharge to [facility]
DVT PPx: Lovenox    Dispo: Pending improvement in O2 requirements. Given frailty will also obtain PT evaluation

## 2020-04-21 NOTE — PROGRESS NOTE ADULT - PROBLEM SELECTOR PLAN 1
Acute hypoxic resp failure Likely 2/2 acute PE in the setting of recent COVID infection and hypercoagulable state and decreased mobility.   - C/w therapeutic lovenox 70 mg bid, can switch to DOAC on discharge, Eliquis 10 mg bid x7 days followed by 5 mg bid  - O2 titrated to 6L in room due to respiratory distress; monitor
Acute hypoxic resp failure Likely 2/2 acute PE in the setting of recent COVID infection and hypercoagulable state and decreased mobility.   - C/w therapeutic lovenox 70 mg bid, can switch to DOAC on discharge, Eliquis 10 mg bid x7 days followed by 5 mg bid  - O2 titrated to 6L in room due to respiratory distress; monitor and continue to wean as tolerated
Acute hypoxic resp failure Likely 2/2 acute PE in the setting of recent COVID infection and hypercoagulable state and decreased mobility.   - C/w therapeutic lovenox 70 mg bid, discharge pt home on Eliquis 10 mg bid x7 days followed by 5 mg bid  - weaned off O2, sat 97% on RA  -discharge patient home today with home care, F/U CM
Acute hypoxic resp failure Likely 2/2 acute PE in the setting of recent COVID infection and hypercoagulable state and decreased mobility.   - C/w therapeutic lovenox 70 mg bid, discharge pt home on Eliquis 10 mg bid x7 days followed by 5 mg bid  - weaned off O2, sat 97% on RA, however, he desat to 80% with ambulation yesterday  -PT reeval as pt is very debilitated and weak, initially recommended home with home PT/Home care but may need rehab  -discharge when ambulatory O2 sat on RA >90%, if PT changes rec to rehab, can be discharged to rehab with O2  -F/U CM/SW
Acute hypoxic resp failure Likely 2/2 acute PE in the setting of recent COVID infection and hypercoagulable state and decreased mobility.   - C/w therapeutic lovenox 70 mg bid, discharge pt home on Eliquis 10 mg bid x7 days followed by 5 mg bid  -F/U CM/SW for LEI
Acute hypoxic resp failure Likely 2/2 acute PE in the setting of recent COVID infection and hypercoagulable state and decreased mobility.   - C/w therapeutic lovenox 70 mg bid, discharge pt home on Eliquis 10 mg bid x7 days followed by 5 mg bid  -F/U CM/SW for LEI
Acute hypoxic resp failure Likely 2/2 acute PE in the setting of recent COVID infection and hypercoagulable state and decreased mobility. Per pt and daughter after he was treated for COVID his symptoms improved. However, he was mostly sedentary then PTA he developed acute SOB.    - CT angio chest personally reviewed and notable for PE in the RUL  - C/w therapeutic lovenox 70 mg bid, can switch to DOAC on discharge, Eliquis 10 mg bid x7 days followed by 5 mg bid  - wean O2 as tolerated, currently on 4L NC, cont to wean as tolerated  -discharge when O2 weaned to 2L or less, f/u CM about possible home O2
Acute hypoxic resp failure Likely 2/2 acute PE in the setting of recent COVID infection and hypercoagulable state and decreased mobility. Per pt and daughter after he was treated for COVID his symptoms improved. However, he was mostly sedentary then PTA he developed acute SOB.    - CT angio chest personally reviewed and notable for PE in the RUL  - C/w therapeutic lovenox 70 mg bid, can switch to DOAC on discharge, Eliquis 10 mg bid x7 days followed by 5 mg bid  - wean O2 as tolerated, currently on 6L NC
Acute hypoxic resp failure Likely 2/2 acute PE in the setting of recent COVID infection and hypercoagulable state and decreased mobility. Per pt and daughter after he was treated for COVID his symptoms improved. However, he was mostly sedentary then PTA he developed acute SOB.    - CT angio chest personally reviewed and notable for PE in the RUL  - C/w therapeutic lovenox 70 mg bid, can switch to DOAC on discharge, Eliquis 10 mg bid x7 days followed by 5 mg bid  - wean O2 as tolerated, currently on 6L NC
Acute hypoxic resp failure Likely 2/2 acute PE in the setting of recent COVID infection and hypercoagulable state and decreased mobility. Per pt and daughter after he was treated for COVID his symptoms improved. However, he was mostly sedentary then PTA he developed acute SOB.    - CT angio chest personally reviewed and notable for PE in the RUL  - C/w therapeutic lovenox. Can likely be switched to Doac on discharge .  - wean O2 as tolerated

## 2020-04-21 NOTE — PROGRESS NOTE ADULT - PROBLEM SELECTOR PLAN 3
Likely in the setting of hypoxia. Head CT negative for any cute changes. Metabolic encephalopathy now resolved.   -continue to monitor mental status  -mangement of acute hypoxic respiratory failure as stated above

## 2020-04-21 NOTE — PROGRESS NOTE ADULT - PROBLEM SELECTOR PLAN 7
- Continue home dose of metoprolol

## 2020-04-21 NOTE — PROGRESS NOTE ADULT - PROBLEM SELECTOR PROBLEM 9
Goals of care, counseling/discussion

## 2020-04-21 NOTE — PROGRESS NOTE ADULT - PROBLEM SELECTOR PROBLEM 5
Gastroesophageal reflux disease, esophagitis presence not specified

## 2020-04-21 NOTE — PROGRESS NOTE ADULT - PROBLEM SELECTOR PLAN 2
He has radiographic evidence of B/L PNA and s/p treatment with a 5 day course of Plaquenil and azithromycin at OSH per pts daughter. COVID PCR done here also positive.   -Currently on 6 L NC with sats 92% (Desaturated to 82% on RA) Monitor sats and titrate down O2 as tolerated   - s/p plaquenil and azithromycin as outpt. Would hold on restarting further medications at this time as suspect symptoms from new PE.  -monitor inflammatory markers for now. Expect to be elevated in setting of PE  -PT is recommending home with home PT
He has radiographic evidence of B/L PNA and s/p treatment with a 5 day course of Plaquenil and azithromycin at OSH per pts daughter. COVID PCR done here also positive.   -wean O2 as tolerated   - s/p plaquenil and azithromycin as outpt. Would hold on restarting further medications at this time as suspect symptoms from new PE.  -monitor inflammatory markers for now. Expect to be elevated in setting of PE  -PT is recommending home with home PT
He has radiographic evidence of B/L PNA and s/p treatment with a 5 day course of Plaquenil and azithromycin at OSH per pts daughter. COVID PCR done here also positive.   -wean O2 as tolerated   - s/p plaquenil and azithromycin as outpt. Would hold on restarting further medications at this time as suspect symptoms from new PE.  -monitor inflammatory markers for now. Expect to be elevated in setting of PE  -PT reeval, initial rec home with home PT/home care
He has radiographic evidence of B/L PNA and s/p treatment with a 5 day course of Plaquenil and azithromycin at OSH per pts daughter. COVID PCR done here also positive.   -Currently on 4 L NC with sats >94%. Monitor sats and titrate down O2 as tolerated   - s/p plaquenil and azithromycin as outpt. Would hold on restarting further medications at this time as suspect symptoms from new PE.  -monitor inflammatory markers for now. Expect to be elevated in setting of PE

## 2020-04-21 NOTE — PROGRESS NOTE ADULT - PROBLEM SELECTOR PROBLEM 6
HLD (hyperlipidemia)

## 2020-04-21 NOTE — PROGRESS NOTE ADULT - PROBLEM SELECTOR PROBLEM 8
Hypothyroid

## 2020-04-21 NOTE — PROGRESS NOTE ADULT - PROBLEM SELECTOR PLAN 9
-full code
Daughter wants to discuss with her mother/ wife of the patient.   - consider consulting hospital clinical review team if needed. Pt currently stable and symptoms appear to be in the setting of new PE

## 2020-04-21 NOTE — DISCHARGE NOTE NURSING/CASE MANAGEMENT/SOCIAL WORK - PATIENT PORTAL LINK FT
You can access the FollowMyHealth Patient Portal offered by [facility] by registering at the following website: [email or web address]. By joining [employer]’s FollowMyHealth portal, you will also be able to view your health information using other applications (apps) compatible with our system.

## 2020-04-21 NOTE — PROGRESS NOTE ADULT - PROBLEM SELECTOR PLAN 6
- Continue home dose of Simvastatin

## 2020-04-21 NOTE — PROGRESS NOTE ADULT - SUBJECTIVE AND OBJECTIVE BOX
Patient is a 80y old  Male who presents with a chief complaint of The patient is a 80y Male complaining of shortness of breath (20 Apr 2020 12:27)      SUBJECTIVE / OVERNIGHT EVENTS: No overnight event. Pt feels well without complaint.    MEDICATIONS  (STANDING):  ALBUTerol    90 MICROgram(s) HFA Inhaler 2 Puff(s) Inhalation every 6 hours  dextrose 5%. 1000 milliLiter(s) (50 mL/Hr) IV Continuous <Continuous>  dextrose 50% Injectable 12.5 Gram(s) IV Push once  dextrose 50% Injectable 25 Gram(s) IV Push once  dextrose 50% Injectable 25 Gram(s) IV Push once  enoxaparin Injectable 70 milliGRAM(s) SubCutaneous two times a day  folic acid 1 milliGRAM(s) Oral daily  insulin lispro (HumaLOG) corrective regimen sliding scale   SubCutaneous three times a day before meals  levothyroxine 75 MICROGram(s) Oral daily  metoprolol succinate ER 25 milliGRAM(s) Oral daily  pantoprazole    Tablet 40 milliGRAM(s) Oral before breakfast  simvastatin 20 milliGRAM(s) Oral at bedtime  tamsulosin 0.4 milliGRAM(s) Oral at bedtime    MEDICATIONS  (PRN):  acetaminophen   Tablet .. 650 milliGRAM(s) Oral every 4 hours PRN Temp greater or equal to 38.5C (101.3F)  acetaminophen  Suppository .. 650 milliGRAM(s) Rectal every 4 hours PRN Temp greater or equal to 38.5C (101.3F)  dextrose 40% Gel 15 Gram(s) Oral once PRN Blood Glucose LESS THAN 70 milliGRAM(s)/deciliter  glucagon  Injectable 1 milliGRAM(s) IntraMuscular once PRN Glucose LESS THAN 70 milligrams/deciliter      CAPILLARY BLOOD GLUCOSE      POCT Blood Glucose.: 126 mg/dL (21 Apr 2020 07:50)  POCT Blood Glucose.: 105 mg/dL (20 Apr 2020 17:06)  POCT Blood Glucose.: 102 mg/dL (20 Apr 2020 11:45)    I&O's Summary      PHYSICAL EXAM:  Vital Signs Last 24 Hrs  T(C): 36.8 (20 Apr 2020 20:17), Max: 36.8 (20 Apr 2020 20:17)  T(F): 98.2 (20 Apr 2020 20:17), Max: 98.2 (20 Apr 2020 20:17)  HR: 74 (20 Apr 2020 20:17) (72 - 74)  BP: 117/82 (20 Apr 2020 20:17) (115/81 - 117/82)  BP(mean): --  RR: 18 (20 Apr 2020 20:17) (18 - 24)  SpO2: 95% (20 Apr 2020 20:17) (87% - 97%)  CONSTITUTIONAL: thin older man, NAD  ENMT: Moist oral mucosa, no pharyngeal injection or exudates; normal dentition  RESPIRATORY: Normal respiratory effort; lungs are clear to auscultation bilaterally  CARDIOVASCULAR: Regular rate and rhythm, normal S1 and S2, no murmur/rub/gallop; No lower extremity edema; Peripheral pulses are 2+ bilaterally  ABDOMEN: Nontender to palpation, normoactive bowel sounds, no rebound/guarding; No hepatosplenomegaly      LABS:                        14.1   4.19  )-----------( 303      ( 21 Apr 2020 04:28 )             43.6     04-21    139  |  104  |  31<H>  ----------------------------<  104<H>  4.5   |  22  |  1.02    Ca    10.4      21 Apr 2020 04:28  Phos  3.0     04-21  Mg     2.3     04-21                  RADIOLOGY & ADDITIONAL TESTS:  Results Reviewed:   Imaging Personally Reviewed:  Electrocardiogram Personally Reviewed:    COORDINATION OF CARE:  Care Discussed with Consultants/Other Providers [Y/N]:  Prior or Outpatient Records Reviewed [Y/N]:

## 2020-04-21 NOTE — PROGRESS NOTE ADULT - ASSESSMENT
79 y/o M admitted for metabolic encephalopathy and acute hypoxic respiratory failure 2/2 acute PE likely in the setting for recent COVID-19 infection.
81 y/o M admitted for metabolic encephalopathy and acute hypoxic respiratory failure 2/2 acute PE likely in the setting for recent COVID-19 infection.

## 2020-04-21 NOTE — PROGRESS NOTE ADULT - PROBLEM SELECTOR PLAN 8
- Continue home dose of synthroid.

## 2020-04-21 NOTE — PROGRESS NOTE ADULT - PROBLEM SELECTOR PROBLEM 4
Pre-diabetes

## 2020-04-22 PROBLEM — K21.9 GASTRO-ESOPHAGEAL REFLUX DISEASE WITHOUT ESOPHAGITIS: Chronic | Status: ACTIVE | Noted: 2020-04-11

## 2020-04-29 RX ORDER — APIXABAN 2.5 MG/1
1 TABLET, FILM COATED ORAL
Qty: 0 | Refills: 0 | DISCHARGE
Start: 2020-04-29

## 2023-08-14 NOTE — PROGRESS NOTE ADULT - PROBLEM SELECTOR PLAN 4
-pt has DM2  -resume home metformin on discharge  -ISS for now and will adjust regimen accordingly   -A1c 6.8% Upper Range (In Mg/Kg): 150

## 2023-12-24 ENCOUNTER — EMERGENCY (EMERGENCY)
Facility: HOSPITAL | Age: 83
LOS: 1 days | Discharge: HOME | End: 2023-12-24
Attending: EMERGENCY MEDICINE | Admitting: EMERGENCY MEDICINE
Payer: MEDICARE

## 2023-12-24 VITALS
RESPIRATION RATE: 18 BRPM | SYSTOLIC BLOOD PRESSURE: 103 MMHG | HEART RATE: 69 BPM | DIASTOLIC BLOOD PRESSURE: 54 MMHG | TEMPERATURE: 99 F | OXYGEN SATURATION: 98 %

## 2023-12-24 VITALS
SYSTOLIC BLOOD PRESSURE: 131 MMHG | DIASTOLIC BLOOD PRESSURE: 72 MMHG | RESPIRATION RATE: 18 BRPM | TEMPERATURE: 100 F | OXYGEN SATURATION: 98 % | HEART RATE: 111 BPM

## 2023-12-24 LAB
ALBUMIN SERPL ELPH-MCNC: 4.1 G/DL — SIGNIFICANT CHANGE UP (ref 3.3–5)
ALBUMIN SERPL ELPH-MCNC: 4.1 G/DL — SIGNIFICANT CHANGE UP (ref 3.3–5)
ALP SERPL-CCNC: 76 U/L — SIGNIFICANT CHANGE UP (ref 40–120)
ALP SERPL-CCNC: 76 U/L — SIGNIFICANT CHANGE UP (ref 40–120)
ALT FLD-CCNC: 13 U/L — SIGNIFICANT CHANGE UP (ref 4–41)
ALT FLD-CCNC: 13 U/L — SIGNIFICANT CHANGE UP (ref 4–41)
ANION GAP SERPL CALC-SCNC: 11 MMOL/L — SIGNIFICANT CHANGE UP (ref 7–14)
ANION GAP SERPL CALC-SCNC: 11 MMOL/L — SIGNIFICANT CHANGE UP (ref 7–14)
APPEARANCE UR: ABNORMAL
APPEARANCE UR: ABNORMAL
AST SERPL-CCNC: 12 U/L — SIGNIFICANT CHANGE UP (ref 4–40)
AST SERPL-CCNC: 12 U/L — SIGNIFICANT CHANGE UP (ref 4–40)
BACTERIA # UR AUTO: ABNORMAL /HPF
BACTERIA # UR AUTO: ABNORMAL /HPF
BASOPHILS # BLD AUTO: 0 K/UL — SIGNIFICANT CHANGE UP (ref 0–0.2)
BASOPHILS # BLD AUTO: 0 K/UL — SIGNIFICANT CHANGE UP (ref 0–0.2)
BASOPHILS NFR BLD AUTO: 0 % — SIGNIFICANT CHANGE UP (ref 0–2)
BASOPHILS NFR BLD AUTO: 0 % — SIGNIFICANT CHANGE UP (ref 0–2)
BILIRUB SERPL-MCNC: 0.8 MG/DL — SIGNIFICANT CHANGE UP (ref 0.2–1.2)
BILIRUB SERPL-MCNC: 0.8 MG/DL — SIGNIFICANT CHANGE UP (ref 0.2–1.2)
BILIRUB UR-MCNC: NEGATIVE — SIGNIFICANT CHANGE UP
BILIRUB UR-MCNC: NEGATIVE — SIGNIFICANT CHANGE UP
BUN SERPL-MCNC: 48 MG/DL — HIGH (ref 7–23)
BUN SERPL-MCNC: 48 MG/DL — HIGH (ref 7–23)
CALCIUM SERPL-MCNC: 9.6 MG/DL — SIGNIFICANT CHANGE UP (ref 8.4–10.5)
CALCIUM SERPL-MCNC: 9.6 MG/DL — SIGNIFICANT CHANGE UP (ref 8.4–10.5)
CAST: 2 /LPF — SIGNIFICANT CHANGE UP (ref 0–4)
CAST: 2 /LPF — SIGNIFICANT CHANGE UP (ref 0–4)
CHLORIDE SERPL-SCNC: 104 MMOL/L — SIGNIFICANT CHANGE UP (ref 98–107)
CHLORIDE SERPL-SCNC: 104 MMOL/L — SIGNIFICANT CHANGE UP (ref 98–107)
CO2 SERPL-SCNC: 22 MMOL/L — SIGNIFICANT CHANGE UP (ref 22–31)
CO2 SERPL-SCNC: 22 MMOL/L — SIGNIFICANT CHANGE UP (ref 22–31)
COLOR SPEC: SIGNIFICANT CHANGE UP
COLOR SPEC: SIGNIFICANT CHANGE UP
COMMENT - URINE: SIGNIFICANT CHANGE UP
COMMENT - URINE: SIGNIFICANT CHANGE UP
CREAT SERPL-MCNC: 1.68 MG/DL — HIGH (ref 0.5–1.3)
CREAT SERPL-MCNC: 1.68 MG/DL — HIGH (ref 0.5–1.3)
DIFF PNL FLD: ABNORMAL
DIFF PNL FLD: ABNORMAL
EGFR: 40 ML/MIN/1.73M2 — LOW
EGFR: 40 ML/MIN/1.73M2 — LOW
EOSINOPHIL # BLD AUTO: 0 K/UL — SIGNIFICANT CHANGE UP (ref 0–0.5)
EOSINOPHIL # BLD AUTO: 0 K/UL — SIGNIFICANT CHANGE UP (ref 0–0.5)
EOSINOPHIL NFR BLD AUTO: 0 % — SIGNIFICANT CHANGE UP (ref 0–6)
EOSINOPHIL NFR BLD AUTO: 0 % — SIGNIFICANT CHANGE UP (ref 0–6)
FLUAV AG NPH QL: SIGNIFICANT CHANGE UP
FLUAV AG NPH QL: SIGNIFICANT CHANGE UP
FLUBV AG NPH QL: SIGNIFICANT CHANGE UP
FLUBV AG NPH QL: SIGNIFICANT CHANGE UP
GLUCOSE SERPL-MCNC: 137 MG/DL — HIGH (ref 70–99)
GLUCOSE SERPL-MCNC: 137 MG/DL — HIGH (ref 70–99)
GLUCOSE UR QL: NEGATIVE MG/DL — SIGNIFICANT CHANGE UP
GLUCOSE UR QL: NEGATIVE MG/DL — SIGNIFICANT CHANGE UP
HCT VFR BLD CALC: 37.4 % — LOW (ref 39–50)
HCT VFR BLD CALC: 37.4 % — LOW (ref 39–50)
HGB BLD-MCNC: 12.5 G/DL — LOW (ref 13–17)
HGB BLD-MCNC: 12.5 G/DL — LOW (ref 13–17)
HYALINE CASTS # UR AUTO: PRESENT
HYALINE CASTS # UR AUTO: PRESENT
IANC: 3.36 K/UL — SIGNIFICANT CHANGE UP (ref 1.8–7.4)
IANC: 3.36 K/UL — SIGNIFICANT CHANGE UP (ref 1.8–7.4)
KETONES UR-MCNC: ABNORMAL MG/DL
KETONES UR-MCNC: ABNORMAL MG/DL
LEUKOCYTE ESTERASE UR-ACNC: ABNORMAL
LEUKOCYTE ESTERASE UR-ACNC: ABNORMAL
LYMPHOCYTES # BLD AUTO: 0.97 K/UL — LOW (ref 1–3.3)
LYMPHOCYTES # BLD AUTO: 0.97 K/UL — LOW (ref 1–3.3)
LYMPHOCYTES # BLD AUTO: 18.3 % — SIGNIFICANT CHANGE UP (ref 13–44)
LYMPHOCYTES # BLD AUTO: 18.3 % — SIGNIFICANT CHANGE UP (ref 13–44)
MAGNESIUM SERPL-MCNC: 2 MG/DL — SIGNIFICANT CHANGE UP (ref 1.6–2.6)
MAGNESIUM SERPL-MCNC: 2 MG/DL — SIGNIFICANT CHANGE UP (ref 1.6–2.6)
MCHC RBC-ENTMCNC: 29.5 PG — SIGNIFICANT CHANGE UP (ref 27–34)
MCHC RBC-ENTMCNC: 29.5 PG — SIGNIFICANT CHANGE UP (ref 27–34)
MCHC RBC-ENTMCNC: 33.4 GM/DL — SIGNIFICANT CHANGE UP (ref 32–36)
MCHC RBC-ENTMCNC: 33.4 GM/DL — SIGNIFICANT CHANGE UP (ref 32–36)
MCV RBC AUTO: 88.2 FL — SIGNIFICANT CHANGE UP (ref 80–100)
MCV RBC AUTO: 88.2 FL — SIGNIFICANT CHANGE UP (ref 80–100)
METAMYELOCYTES # FLD: 0.9 % — SIGNIFICANT CHANGE UP (ref 0–1)
METAMYELOCYTES # FLD: 0.9 % — SIGNIFICANT CHANGE UP (ref 0–1)
MONOCYTES # BLD AUTO: 0.69 K/UL — SIGNIFICANT CHANGE UP (ref 0–0.9)
MONOCYTES # BLD AUTO: 0.69 K/UL — SIGNIFICANT CHANGE UP (ref 0–0.9)
MONOCYTES NFR BLD AUTO: 13 % — SIGNIFICANT CHANGE UP (ref 2–14)
MONOCYTES NFR BLD AUTO: 13 % — SIGNIFICANT CHANGE UP (ref 2–14)
MUCOUS THREADS # UR AUTO: PRESENT
MUCOUS THREADS # UR AUTO: PRESENT
NEUTROPHILS # BLD AUTO: 3.52 K/UL — SIGNIFICANT CHANGE UP (ref 1.8–7.4)
NEUTROPHILS # BLD AUTO: 3.52 K/UL — SIGNIFICANT CHANGE UP (ref 1.8–7.4)
NEUTROPHILS NFR BLD AUTO: 38.3 % — LOW (ref 43–77)
NEUTROPHILS NFR BLD AUTO: 38.3 % — LOW (ref 43–77)
NEUTS BAND # BLD: 27.8 % — CRITICAL HIGH (ref 0–6)
NEUTS BAND # BLD: 27.8 % — CRITICAL HIGH (ref 0–6)
NITRITE UR-MCNC: NEGATIVE — SIGNIFICANT CHANGE UP
NITRITE UR-MCNC: NEGATIVE — SIGNIFICANT CHANGE UP
PH UR: 5.5 — SIGNIFICANT CHANGE UP (ref 5–8)
PH UR: 5.5 — SIGNIFICANT CHANGE UP (ref 5–8)
PHOSPHATE SERPL-MCNC: 2.5 MG/DL — SIGNIFICANT CHANGE UP (ref 2.5–4.5)
PHOSPHATE SERPL-MCNC: 2.5 MG/DL — SIGNIFICANT CHANGE UP (ref 2.5–4.5)
PLAT MORPH BLD: NORMAL — SIGNIFICANT CHANGE UP
PLAT MORPH BLD: NORMAL — SIGNIFICANT CHANGE UP
PLATELET # BLD AUTO: 191 K/UL — SIGNIFICANT CHANGE UP (ref 150–400)
PLATELET # BLD AUTO: 191 K/UL — SIGNIFICANT CHANGE UP (ref 150–400)
PLATELET COUNT - ESTIMATE: NORMAL — SIGNIFICANT CHANGE UP
PLATELET COUNT - ESTIMATE: NORMAL — SIGNIFICANT CHANGE UP
POTASSIUM SERPL-MCNC: 4.5 MMOL/L — SIGNIFICANT CHANGE UP (ref 3.5–5.3)
POTASSIUM SERPL-MCNC: 4.5 MMOL/L — SIGNIFICANT CHANGE UP (ref 3.5–5.3)
POTASSIUM SERPL-SCNC: 4.5 MMOL/L — SIGNIFICANT CHANGE UP (ref 3.5–5.3)
POTASSIUM SERPL-SCNC: 4.5 MMOL/L — SIGNIFICANT CHANGE UP (ref 3.5–5.3)
PROT SERPL-MCNC: 8.5 G/DL — HIGH (ref 6–8.3)
PROT SERPL-MCNC: 8.5 G/DL — HIGH (ref 6–8.3)
PROT UR-MCNC: 300 MG/DL
PROT UR-MCNC: 300 MG/DL
RBC # BLD: 4.24 M/UL — SIGNIFICANT CHANGE UP (ref 4.2–5.8)
RBC # BLD: 4.24 M/UL — SIGNIFICANT CHANGE UP (ref 4.2–5.8)
RBC # FLD: 12.4 % — SIGNIFICANT CHANGE UP (ref 10.3–14.5)
RBC # FLD: 12.4 % — SIGNIFICANT CHANGE UP (ref 10.3–14.5)
RBC BLD AUTO: NORMAL — SIGNIFICANT CHANGE UP
RBC BLD AUTO: NORMAL — SIGNIFICANT CHANGE UP
RBC CASTS # UR COMP ASSIST: 4 /HPF — SIGNIFICANT CHANGE UP (ref 0–4)
RBC CASTS # UR COMP ASSIST: 4 /HPF — SIGNIFICANT CHANGE UP (ref 0–4)
REVIEW: SIGNIFICANT CHANGE UP
REVIEW: SIGNIFICANT CHANGE UP
RSV RNA NPH QL NAA+NON-PROBE: SIGNIFICANT CHANGE UP
RSV RNA NPH QL NAA+NON-PROBE: SIGNIFICANT CHANGE UP
SARS-COV-2 RNA SPEC QL NAA+PROBE: SIGNIFICANT CHANGE UP
SARS-COV-2 RNA SPEC QL NAA+PROBE: SIGNIFICANT CHANGE UP
SODIUM SERPL-SCNC: 137 MMOL/L — SIGNIFICANT CHANGE UP (ref 135–145)
SODIUM SERPL-SCNC: 137 MMOL/L — SIGNIFICANT CHANGE UP (ref 135–145)
SP GR SPEC: 1.02 — SIGNIFICANT CHANGE UP (ref 1–1.03)
SP GR SPEC: 1.02 — SIGNIFICANT CHANGE UP (ref 1–1.03)
SQUAMOUS # UR AUTO: 42 /HPF — HIGH (ref 0–5)
SQUAMOUS # UR AUTO: 42 /HPF — HIGH (ref 0–5)
TSH SERPL-MCNC: 1.5 UIU/ML — SIGNIFICANT CHANGE UP (ref 0.27–4.2)
TSH SERPL-MCNC: 1.5 UIU/ML — SIGNIFICANT CHANGE UP (ref 0.27–4.2)
UROBILINOGEN FLD QL: 0.2 MG/DL — SIGNIFICANT CHANGE UP (ref 0.2–1)
UROBILINOGEN FLD QL: 0.2 MG/DL — SIGNIFICANT CHANGE UP (ref 0.2–1)
VARIANT LYMPHS # BLD: 1.7 % — SIGNIFICANT CHANGE UP (ref 0–6)
VARIANT LYMPHS # BLD: 1.7 % — SIGNIFICANT CHANGE UP (ref 0–6)
WBC # BLD: 5.32 K/UL — SIGNIFICANT CHANGE UP (ref 3.8–10.5)
WBC # BLD: 5.32 K/UL — SIGNIFICANT CHANGE UP (ref 3.8–10.5)
WBC # FLD AUTO: 5.32 K/UL — SIGNIFICANT CHANGE UP (ref 3.8–10.5)
WBC # FLD AUTO: 5.32 K/UL — SIGNIFICANT CHANGE UP (ref 3.8–10.5)
WBC UR QL: >50 /HPF — HIGH (ref 0–5)
WBC UR QL: >50 /HPF — HIGH (ref 0–5)

## 2023-12-24 PROCEDURE — 93010 ELECTROCARDIOGRAM REPORT: CPT

## 2023-12-24 PROCEDURE — 74176 CT ABD & PELVIS W/O CONTRAST: CPT | Mod: 26,MA

## 2023-12-24 PROCEDURE — 99284 EMERGENCY DEPT VISIT MOD MDM: CPT

## 2023-12-24 PROCEDURE — 70450 CT HEAD/BRAIN W/O DYE: CPT | Mod: 26,MD

## 2023-12-24 RX ORDER — ACETAMINOPHEN 500 MG
975 TABLET ORAL ONCE
Refills: 0 | Status: COMPLETED | OUTPATIENT
Start: 2023-12-24 | End: 2023-12-24

## 2023-12-24 RX ADMIN — Medication 975 MILLIGRAM(S): at 21:17

## 2023-12-24 NOTE — ED ADULT TRIAGE NOTE - CHIEF COMPLAINT QUOTE
Pt presents to ED via EMS from home with c/o difficulty urinating and diarrhea x 1 day. Pt reports feeling abdominal pain/distension. Pt has hx of HTN.

## 2023-12-24 NOTE — ED PROVIDER NOTE - NSFOLLOWUPCLINICS_GEN_ALL_ED_FT
[facility]  Urology  [address] [place] [address] - [address]rd [place] [address]  Phone: [phone]  Fax:      [facility]  Urology  [address] [place] [address] - 3rd [place] [address]  Phone: [phone]  Fax:

## 2023-12-24 NOTE — ED PROVIDER NOTE - PROGRESS NOTE DETAILS
Patient updated regarding results of CT scan and imaging, daughter states patient has history of CKD and needs to follow-up with nephrologist.  Patient having bowel movements in ED, Blank placed with decompression of stomach and resolution of abdominal pain, patient states he would feel more comfortable at home with leg bag, offered mission and declined.  Otherwise medically cleared for DC to home with urology follow-up, Blank and leg bag in place.  All questions answered prior to discharge.  Results, return precautions, discharge instructions discussed and provided in DC paperwork. - Naeem Nolan, PGY-3

## 2023-12-24 NOTE — ED PROVIDER NOTE - ATTENDING CONTRIBUTION TO CARE
I performed a face-to-face evaluation of the patient and performed a history and physical examination. I agree with the history and physical examination. If this was a PA visit, I personally saw the patient with the PA and performed a substantive portion of the visit including all aspects of the medical decision making.    Charles: P/w urinary retention and diarrhea (? 1-7 days). F. Increased confusion. Likely UTI. Check UA. Place Blank. Check Cr. CT brain and abd.

## 2023-12-24 NOTE — ED PROVIDER NOTE - OBJECTIVE STATEMENT
83-year-old male history of HTN, HLD, hypothyroidism, DM2 on metformin,  presenting  with urinary retention, abdominal pain and distention, diarrhea.  Daughter at bedside providing history as patient mildly confused.  daughter reports patient lives independently, went to visit him today and found his abdomen to be distended, patient was complaining of abdominal pain, does not remember the last time he urinated.  Also reports multiple episodes of diarrhea over the last several days, unclear when diarrhea began, nonbloody.  No recent antibiotic use, patient is otherwise been in normal state of health until daughter visited him today.  Ambulates independently at baseline.  Denies recent fever/chills, HA, SOB, CP, scrotal/testicular pain, nausea/vomiting, constipation, hematuria, hematochezia/melena, numbness/tingling, focal/generalized weakness, sick contacts, recent travel.

## 2023-12-24 NOTE — ED ADULT NURSE NOTE - OBJECTIVE STATEMENT
82 y/o M presents to ED room 27 A&Ox3 (deficit in situation) c/o abd pain. daughter at bedside providing collateral. as per daughter, pt c/o lower abd pain, inability to urinate for unknown amount of time and bowel incontinence, increasing confusion over the last 2 days. bladder distension noted. pt had episode of incontinence in room. respirations even and unlabored. denies c/p, SOB, n/v/d, weakness, fevers. 14Fr coude placed as per orders, pt tolerated well. 1300 ccs of cloudy, dark yellow urine noted. pt tolerated well, endorsing relief. 20G placed to RAC, labs drawn and sent. medicated as per mD orders. awaiting CT. safety maintained, side rails up 84 y/o M presents to ED room 27 A&Ox3 (deficit in situation) c/o abd pain. daughter at bedside providing collateral. as per daughter, pt c/o lower abd pain, inability to urinate for unknown amount of time and bowel incontinence, increasing confusion over the last 2 days. bladder distension noted. pt had episode of incontinence in room. respirations even and unlabored. denies c/p, SOB, n/v/d, weakness, fevers. 14Fr coude placed as per orders, pt tolerated well. 1300 ccs of cloudy, dark yellow urine noted. pt tolerated well, endorsing relief. 20G placed to RAC, labs drawn and sent. medicated as per mD orders. awaiting CT. safety maintained, side rails up 82 y/o M presents to ED room 27 A&Ox3 (deficit in situation) c/o abd pain. daughter at bedside providing collateral. as per daughter, pt c/o lower abd pain, inability to urinate for unknown amount of time and bowel incontinence, increasing confusion over the last 2 days. bladder distension noted. pt had episode of incontinence in room. brief and linens changed at this time. respirations even and unlabored. denies c/p, SOB, n/v/d, weakness, fevers. 14Fr coude placed as per orders, pt tolerated well. 1300 ccs of cloudy, dark yellow urine noted. pt tolerated well, endorsing relief. 20G placed to RAC, labs drawn and sent. medicated as per mD orders. awaiting CT. safety maintained, side rails up 84 y/o M presents to ED room 27 A&Ox3 (deficit in situation) c/o abd pain. daughter at bedside providing collateral. as per daughter, pt c/o lower abd pain, inability to urinate for unknown amount of time and bowel incontinence, increasing confusion over the last 2 days. bladder distension noted. pt had episode of incontinence in room. brief and linens changed at this time. respirations even and unlabored. denies c/p, SOB, n/v/d, weakness, fevers. 14Fr coude placed as per orders, pt tolerated well. 1300 ccs of cloudy, dark yellow urine noted. pt tolerated well, endorsing relief. 20G placed to RAC, labs drawn and sent. medicated as per mD orders. awaiting CT. safety maintained, side rails up

## 2023-12-24 NOTE — ED PROVIDER NOTE - NSFOLLOWUPINSTRUCTIONS_ED_ALL_ED_FT
Please follow up with a urologist within 1 week. Bring copies of your results with you (provided in your discharge paperwork). Please stay well-hydrated.    You may take 500-1000 mg acetaminophen (tylenol) every 6 hours, as needed for pain.  You may take 600 mg ibuprofen every 8 hours, with food, as needed for pain.  You can take tylenol and ibuprofen at the same time.     PLEASE RETURN TO ED FOR NEW OR WORSENING SYMPTOMS (intractable nausea/vomiting, severe bleeding, fever over 100.4F, loss of movement of one or more limbs, seizure)    A urinary tract infection (UTI) is an infection of any part of the urinary tract, which includes the kidneys, ureters, bladder, and urethra. Risk factors include ignoring your need to urinate, wiping back to front if female, being an uncircumcised male, and having diabetes or a weak immune system. Symptoms include frequent urination, pain or burning with urination, foul smelling urine, cloudy urine, pain in the lower abdomen, blood in the urine, and fever. If you were prescribed an antibiotic medicine, take it as told by your health care provider. Do not stop taking the antibiotic even if you start to feel better.    SEEK IMMEDIATE MEDICAL CARE IF YOU HAVE ANY OF THE FOLLOWING SYMPTOMS: severe back or abdominal pain, fever, inability to keep fluids or medicine down, dizziness/lightheadedness, or a change in mental status.

## 2023-12-24 NOTE — ED PROVIDER NOTE - PHYSICAL EXAMINATION
Gen: AAOx3, non-toxic  Head: NCAT  HEENT: EOMI, oral mucosa dry, normal conjunctiva  Lung: CTAB, no respiratory distress, no wheezes/rhonchi/rales B/L, speaking in full sentences  CV: RRR, no murmurs, rubs or gallops  Abd: soft, +distended, +tender w/palpable bladder at level just below umbilicus, +b/l CVA ttp  MSK: no visible deformities  Neuro: No focal sensory or motor deficits  Skin: Warm, well perfused, no rash  Psych: normal affect.   ~Naeem Nolan M.D. Resident

## 2023-12-24 NOTE — ED PROVIDER NOTE - PATIENT PORTAL LINK FT
You can access the FollowMyHealth Patient Portal offered by [facility] by registering at the following website: [email or web address]. By joining [employer]’s FollowMyHealth portal, you will also be able to view your health information using other applications (apps) compatible with our system. You can access the FollowMyHealth Patient Portal offered by [facility] by registering at the following website: [email or web address]. By joining [employer]’s FollowMyHealth portal, you will also be able to view your health information using other applications (apps) compatible with our system.

## 2023-12-24 NOTE — ED PROVIDER NOTE - NSICDXPASTMEDICALHX_GEN_ALL_CORE_FT
PAST MEDICAL HISTORY:  Gastroesophageal reflux disease, esophagitis presence not specified     HLD (hyperlipidemia)     HTN (hypertension)     Hypothyroid     Type 2 diabetes mellitus on metformin

## 2023-12-24 NOTE — ED PROVIDER NOTE - CLINICAL SUMMARY MEDICAL DECISION MAKING FREE TEXT BOX
Charles: P/w urinary retention and diarrhea (? 1-7 days). F. Increased confusion. Likely UTI. Check UA. Place Blank. Check Cr. CT brain and abd.

## 2023-12-24 NOTE — ED ADULT NURSE NOTE - NSFALLUNIVINTERV_ED_ALL_ED
Bed/Stretcher in lowest position, wheels locked, appropriate side rails in place/Call bell, personal items and telephone in reach/Instruct patient to call for assistance before getting out of bed/chair/stretcher/Non-slip footwear applied when patient is off stretcher/[place] to call system/Physically safe environment - no spills, clutter or unnecessary equipment/Purposeful proactive rounding/Room/bathroom lighting operational, light cord in reach Bed/Stretcher in lowest position, wheels locked, appropriate side rails in place/Call bell, personal items and telephone in reach/Instruct patient to call for assistance before getting out of bed/chair/stretcher/Non-slip footwear applied when patient is off stretcher/[place] to call system/Physically safe environment - no spills, clutter or unnecessary equipment/Purposeful proactive rounding/Room/bathroom lighting operational, light cord in reach

## 2023-12-25 RX ORDER — CEFTRIAXONE 500 MG/1
1000 INJECTION, POWDER, FOR SOLUTION INTRAMUSCULAR; INTRAVENOUS ONCE
Refills: 0 | Status: DISCONTINUED | OUTPATIENT
Start: 2023-12-25 | End: 2023-12-25

## 2023-12-25 RX ORDER — CIPROFLOXACIN LACTATE 400MG/40ML
1 VIAL (ML) INTRAVENOUS
Qty: 112 | Refills: 0
Start: 2023-12-25 | End: 2024-02-18

## 2023-12-25 RX ORDER — CEFTRIAXONE 500 MG/1
1000 INJECTION, POWDER, FOR SOLUTION INTRAMUSCULAR; INTRAVENOUS ONCE
Refills: 0 | Status: COMPLETED | OUTPATIENT
Start: 2023-12-25 | End: 2023-12-25

## 2023-12-25 RX ORDER — CEFPODOXIME PROXETIL 100 MG
1 TABLET ORAL
Qty: 20 | Refills: 0
Start: 2023-12-25 | End: 2024-01-03

## 2023-12-25 RX ADMIN — CEFTRIAXONE 100 MILLIGRAM(S): 500 INJECTION, POWDER, FOR SOLUTION INTRAMUSCULAR; INTRAVENOUS at 00:31

## 2023-12-26 NOTE — ED POST DISCHARGE NOTE - REASON FOR FOLLOW-UP
Other Pharmacy called Clindamycin and cipro ordered X 56 days emailed provider Dr Naeem Nolan who said RX for Cipro and clindamycin were ordered in error. I callled pharmacy and had then cancel and just fill RX for Cefpodoxime.

## 2023-12-27 ENCOUNTER — APPOINTMENT (OUTPATIENT)
Dept: UROLOGY | Facility: CLINIC | Age: 83
End: 2023-12-27

## 2023-12-27 LAB
-  AMOXICILLIN/CLAVULANIC ACID: SIGNIFICANT CHANGE UP
-  AMOXICILLIN/CLAVULANIC ACID: SIGNIFICANT CHANGE UP
-  AMPICILLIN/SULBACTAM: SIGNIFICANT CHANGE UP
-  AMPICILLIN/SULBACTAM: SIGNIFICANT CHANGE UP
-  AMPICILLIN: SIGNIFICANT CHANGE UP
-  AMPICILLIN: SIGNIFICANT CHANGE UP
-  AZTREONAM: SIGNIFICANT CHANGE UP
-  AZTREONAM: SIGNIFICANT CHANGE UP
-  CEFAZOLIN: SIGNIFICANT CHANGE UP
-  CEFAZOLIN: SIGNIFICANT CHANGE UP
-  CEFEPIME: SIGNIFICANT CHANGE UP
-  CEFEPIME: SIGNIFICANT CHANGE UP
-  CEFOXITIN: SIGNIFICANT CHANGE UP
-  CEFOXITIN: SIGNIFICANT CHANGE UP
-  CEFTRIAXONE: SIGNIFICANT CHANGE UP
-  CEFTRIAXONE: SIGNIFICANT CHANGE UP
-  CIPROFLOXACIN: SIGNIFICANT CHANGE UP
-  CIPROFLOXACIN: SIGNIFICANT CHANGE UP
-  ERTAPENEM: SIGNIFICANT CHANGE UP
-  ERTAPENEM: SIGNIFICANT CHANGE UP
-  GENTAMICIN: SIGNIFICANT CHANGE UP
-  GENTAMICIN: SIGNIFICANT CHANGE UP
-  LEVOFLOXACIN: SIGNIFICANT CHANGE UP
-  LEVOFLOXACIN: SIGNIFICANT CHANGE UP
-  MEROPENEM: SIGNIFICANT CHANGE UP
-  MEROPENEM: SIGNIFICANT CHANGE UP
-  NITROFURANTOIN: SIGNIFICANT CHANGE UP
-  NITROFURANTOIN: SIGNIFICANT CHANGE UP
-  PIPERACILLIN/TAZOBACTAM: SIGNIFICANT CHANGE UP
-  PIPERACILLIN/TAZOBACTAM: SIGNIFICANT CHANGE UP
-  TOBRAMYCIN: SIGNIFICANT CHANGE UP
-  TOBRAMYCIN: SIGNIFICANT CHANGE UP
-  TRIMETHOPRIM/SULFAMETHOXAZOLE: SIGNIFICANT CHANGE UP
-  TRIMETHOPRIM/SULFAMETHOXAZOLE: SIGNIFICANT CHANGE UP
CULTURE RESULTS: ABNORMAL
CULTURE RESULTS: ABNORMAL
METHOD TYPE: SIGNIFICANT CHANGE UP
METHOD TYPE: SIGNIFICANT CHANGE UP
ORGANISM # SPEC MICROSCOPIC CNT: ABNORMAL
SPECIMEN SOURCE: SIGNIFICANT CHANGE UP
SPECIMEN SOURCE: SIGNIFICANT CHANGE UP

## 2024-01-17 NOTE — ED PROVIDER NOTE - NSCAREINITIATED _GEN_ER
Rogelio Goldstein(Attending) [No] : Have you experienced or witnessed an event that caused or threatened to cause serious harm to you or to someone else? No

## 2024-02-21 NOTE — PHYSICAL THERAPY INITIAL EVALUATION ADULT - LEVEL OF INDEPENDENCE: STAND/SIT, REHAB EVAL
Addended by: FLORIDALMA ARMENTA on: 2/21/2024 08:38 AM     Modules accepted: Orders    
contact guard

## 2024-05-15 PROBLEM — E11.9 TYPE 2 DIABETES MELLITUS WITHOUT COMPLICATIONS: Chronic | Status: ACTIVE | Noted: 2023-12-24

## 2024-05-21 ENCOUNTER — OUTPATIENT (OUTPATIENT)
Dept: OUTPATIENT SERVICES | Facility: HOSPITAL | Age: 84
LOS: 1 days | End: 2024-05-21
Payer: MEDICARE

## 2024-05-21 ENCOUNTER — APPOINTMENT (OUTPATIENT)
Dept: NUCLEAR MEDICINE | Facility: IMAGING CENTER | Age: 84
End: 2024-05-21
Payer: MEDICARE

## 2024-05-21 DIAGNOSIS — C90.00 MULTIPLE MYELOMA NOT HAVING ACHIEVED REMISSION: ICD-10-CM

## 2024-05-21 PROCEDURE — 78816 PET IMAGE W/CT FULL BODY: CPT

## 2024-05-21 PROCEDURE — 78816 PET IMAGE W/CT FULL BODY: CPT | Mod: 26,PI

## 2024-05-21 PROCEDURE — A9552: CPT

## 2024-09-23 ENCOUNTER — INPATIENT (INPATIENT)
Facility: HOSPITAL | Age: 84
LOS: 5 days | Discharge: HOME | DRG: 66 | End: 2024-09-29
Attending: HOSPITALIST | Admitting: STUDENT IN AN ORGANIZED HEALTH CARE EDUCATION/TRAINING PROGRAM
Payer: MEDICARE

## 2024-09-23 ENCOUNTER — EMERGENCY (EMERGENCY)
Facility: HOSPITAL | Age: 84
LOS: 1 days | Discharge: ANOTHER HOSPITAL | End: 2024-09-23
Attending: INTERNAL MEDICINE | Admitting: INTERNAL MEDICINE
Payer: MEDICARE

## 2024-09-23 VITALS
DIASTOLIC BLOOD PRESSURE: 78 MMHG | OXYGEN SATURATION: 99 % | HEART RATE: 65 BPM | WEIGHT: 147.93 LBS | HEIGHT: 72 IN | TEMPERATURE: 98 F | RESPIRATION RATE: 18 BRPM | SYSTOLIC BLOOD PRESSURE: 130 MMHG

## 2024-09-23 VITALS
RESPIRATION RATE: 18 BRPM | WEIGHT: 138.01 LBS | TEMPERATURE: 98 F | DIASTOLIC BLOOD PRESSURE: 64 MMHG | HEART RATE: 59 BPM | OXYGEN SATURATION: 96 % | SYSTOLIC BLOOD PRESSURE: 124 MMHG | HEIGHT: 72 IN

## 2024-09-23 VITALS
RESPIRATION RATE: 20 BRPM | HEART RATE: 55 BPM | SYSTOLIC BLOOD PRESSURE: 128 MMHG | DIASTOLIC BLOOD PRESSURE: 63 MMHG | OXYGEN SATURATION: 99 %

## 2024-09-23 DIAGNOSIS — S06.309A UNSPECIFIED FOCAL TRAUMATIC BRAIN INJURY WITH LOSS OF CONSCIOUSNESS OF UNSPECIFIED DURATION, INITIAL ENCOUNTER: ICD-10-CM

## 2024-09-23 DIAGNOSIS — I63.89 OTHER CEREBRAL INFARCTION: ICD-10-CM

## 2024-09-23 LAB
ALBUMIN SERPL ELPH-MCNC: 3.4 G/DL — SIGNIFICANT CHANGE UP (ref 3.3–5)
ALP SERPL-CCNC: 74 U/L — SIGNIFICANT CHANGE UP (ref 40–120)
ALT FLD-CCNC: 20 U/L — SIGNIFICANT CHANGE UP (ref 10–45)
ANION GAP SERPL CALC-SCNC: 11 MMOL/L — SIGNIFICANT CHANGE UP (ref 5–17)
APTT BLD: 29.9 SEC — SIGNIFICANT CHANGE UP (ref 24.5–35.6)
AST SERPL-CCNC: 16 U/L — SIGNIFICANT CHANGE UP (ref 10–40)
BASOPHILS # BLD AUTO: 0.01 K/UL — SIGNIFICANT CHANGE UP (ref 0–0.2)
BASOPHILS NFR BLD AUTO: 0.5 % — SIGNIFICANT CHANGE UP (ref 0–2)
BILIRUB SERPL-MCNC: 0.6 MG/DL — SIGNIFICANT CHANGE UP (ref 0.2–1.2)
BUN SERPL-MCNC: 49 MG/DL — HIGH (ref 7–23)
CALCIUM SERPL-MCNC: 9.7 MG/DL — SIGNIFICANT CHANGE UP (ref 8.4–10.5)
CHLORIDE SERPL-SCNC: 109 MMOL/L — HIGH (ref 96–108)
CO2 SERPL-SCNC: 25 MMOL/L — SIGNIFICANT CHANGE UP (ref 22–31)
CREAT SERPL-MCNC: 2.38 MG/DL — HIGH (ref 0.5–1.3)
EGFR: 26 ML/MIN/1.73M2 — LOW
EOSINOPHIL # BLD AUTO: 0.04 K/UL — SIGNIFICANT CHANGE UP (ref 0–0.5)
EOSINOPHIL NFR BLD AUTO: 2 % — SIGNIFICANT CHANGE UP (ref 0–6)
GLUCOSE SERPL-MCNC: 95 MG/DL — SIGNIFICANT CHANGE UP (ref 70–99)
HCT VFR BLD CALC: 34.1 % — LOW (ref 39–50)
HGB BLD-MCNC: 11.6 G/DL — LOW (ref 13–17)
IMM GRANULOCYTES NFR BLD AUTO: 0.5 % — SIGNIFICANT CHANGE UP (ref 0–0.9)
INR BLD: 1.17 RATIO — SIGNIFICANT CHANGE UP (ref 0.85–1.18)
LYMPHOCYTES # BLD AUTO: 0.98 K/UL — LOW (ref 1–3.3)
LYMPHOCYTES # BLD AUTO: 49 % — HIGH (ref 13–44)
MCHC RBC-ENTMCNC: 30.9 PG — SIGNIFICANT CHANGE UP (ref 27–34)
MCHC RBC-ENTMCNC: 34 GM/DL — SIGNIFICANT CHANGE UP (ref 32–36)
MCV RBC AUTO: 90.9 FL — SIGNIFICANT CHANGE UP (ref 80–100)
MONOCYTES # BLD AUTO: 0.31 K/UL — SIGNIFICANT CHANGE UP (ref 0–0.9)
MONOCYTES NFR BLD AUTO: 15.5 % — HIGH (ref 2–14)
NEUTROPHILS # BLD AUTO: 0.65 K/UL — LOW (ref 1.8–7.4)
NEUTROPHILS NFR BLD AUTO: 32.5 % — LOW (ref 43–77)
NRBC # BLD: 0 /100 WBCS — SIGNIFICANT CHANGE UP (ref 0–0)
PA ADP PRP-ACNC: 120 PRU — LOW (ref 182–335)
PLATELET # BLD AUTO: 152 K/UL — SIGNIFICANT CHANGE UP (ref 150–400)
POTASSIUM SERPL-MCNC: 4.4 MMOL/L — SIGNIFICANT CHANGE UP (ref 3.5–5.3)
POTASSIUM SERPL-SCNC: 4.4 MMOL/L — SIGNIFICANT CHANGE UP (ref 3.5–5.3)
PROT SERPL-MCNC: 8.5 G/DL — HIGH (ref 6–8.3)
PROTHROM AB SERPL-ACNC: 13.6 SEC — HIGH (ref 9.5–13)
RBC # BLD: 3.75 M/UL — LOW (ref 4.2–5.8)
RBC # FLD: 12.9 % — SIGNIFICANT CHANGE UP (ref 10.3–14.5)
SODIUM SERPL-SCNC: 145 MMOL/L — SIGNIFICANT CHANGE UP (ref 135–145)
TROPONIN I, HIGH SENSITIVITY RESULT: 12.2 NG/L — SIGNIFICANT CHANGE UP
WBC # BLD: 2 K/UL — LOW (ref 3.8–10.5)
WBC # FLD AUTO: 2 K/UL — LOW (ref 3.8–10.5)

## 2024-09-23 PROCEDURE — 93010 ELECTROCARDIOGRAM REPORT: CPT

## 2024-09-23 PROCEDURE — 85730 THROMBOPLASTIN TIME PARTIAL: CPT

## 2024-09-23 PROCEDURE — 72125 CT NECK SPINE W/O DYE: CPT | Mod: 26,MC

## 2024-09-23 PROCEDURE — 99221 1ST HOSP IP/OBS SF/LOW 40: CPT | Mod: FS

## 2024-09-23 PROCEDURE — 70450 CT HEAD/BRAIN W/O DYE: CPT | Mod: MC

## 2024-09-23 PROCEDURE — 93005 ELECTROCARDIOGRAM TRACING: CPT

## 2024-09-23 PROCEDURE — 99285 EMERGENCY DEPT VISIT HI MDM: CPT

## 2024-09-23 PROCEDURE — 80053 COMPREHEN METABOLIC PANEL: CPT

## 2024-09-23 PROCEDURE — 70450 CT HEAD/BRAIN W/O DYE: CPT | Mod: 26,MC

## 2024-09-23 PROCEDURE — 36415 COLL VENOUS BLD VENIPUNCTURE: CPT

## 2024-09-23 PROCEDURE — 85025 COMPLETE CBC W/AUTO DIFF WBC: CPT

## 2024-09-23 PROCEDURE — 99285 EMERGENCY DEPT VISIT HI MDM: CPT | Mod: 25

## 2024-09-23 PROCEDURE — 84484 ASSAY OF TROPONIN QUANT: CPT

## 2024-09-23 PROCEDURE — 70450 CT HEAD/BRAIN W/O DYE: CPT | Mod: 26,MC,77

## 2024-09-23 PROCEDURE — 85610 PROTHROMBIN TIME: CPT

## 2024-09-23 PROCEDURE — 99285 EMERGENCY DEPT VISIT HI MDM: CPT | Mod: GC

## 2024-09-23 RX ORDER — TAMSULOSIN HCL 0.4 MG
0.4 CAPSULE ORAL AT BEDTIME
Refills: 0 | Status: DISCONTINUED | OUTPATIENT
Start: 2024-09-23 | End: 2024-09-29

## 2024-09-23 RX ORDER — ALCOHOL ANTISEPTIC PADS
25 PADS, MEDICATED (EA) TOPICAL ONCE
Refills: 0 | Status: DISCONTINUED | OUTPATIENT
Start: 2024-09-23 | End: 2024-09-24

## 2024-09-23 RX ORDER — ATORVASTATIN CALCIUM 10 MG/1
1 TABLET, FILM COATED ORAL
Refills: 0 | DISCHARGE

## 2024-09-23 RX ORDER — SODIUM CHLORIDE IRRIG SOLUTION 0.9 %
500 SOLUTION, IRRIGATION IRRIGATION ONCE
Refills: 0 | Status: COMPLETED | OUTPATIENT
Start: 2024-09-23 | End: 2024-09-23

## 2024-09-23 RX ORDER — CHLORHEXIDINE GLUCONATE ORAL RINSE 1.2 MG/ML
1 SOLUTION DENTAL
Refills: 0 | Status: DISCONTINUED | OUTPATIENT
Start: 2024-09-23 | End: 2024-09-29

## 2024-09-23 RX ORDER — METOPROLOL TARTRATE 50 MG
25 TABLET ORAL DAILY
Refills: 0 | Status: DISCONTINUED | OUTPATIENT
Start: 2024-09-23 | End: 2024-09-23

## 2024-09-23 RX ORDER — INSULIN LISPRO 100/ML
VIAL (ML) SUBCUTANEOUS
Refills: 0 | Status: DISCONTINUED | OUTPATIENT
Start: 2024-09-23 | End: 2024-09-29

## 2024-09-23 RX ORDER — ATORVASTATIN CALCIUM 10 MG/1
80 TABLET, FILM COATED ORAL AT BEDTIME
Refills: 0 | Status: DISCONTINUED | OUTPATIENT
Start: 2024-09-23 | End: 2024-09-25

## 2024-09-23 RX ORDER — SODIUM CHLORIDE 0.9 % (FLUSH) 0.9 %
1000 SYRINGE (ML) INJECTION
Refills: 0 | Status: DISCONTINUED | OUTPATIENT
Start: 2024-09-23 | End: 2024-09-25

## 2024-09-23 RX ORDER — FOLIC ACID 1 MG/1
1 TABLET ORAL DAILY
Refills: 0 | Status: DISCONTINUED | OUTPATIENT
Start: 2024-09-23 | End: 2024-09-29

## 2024-09-23 RX ADMIN — ATORVASTATIN CALCIUM 80 MILLIGRAM(S): 10 TABLET, FILM COATED ORAL at 22:40

## 2024-09-23 RX ADMIN — Medication 0.4 MILLIGRAM(S): at 22:40

## 2024-09-23 NOTE — ED PROVIDER NOTE - OBJECTIVE STATEMENT
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

## 2024-09-23 NOTE — ED PROVIDER NOTE - ATTENDING CONTRIBUTION TO CARE
Aaron: I performed a face to face evaluation of this patient and performed a full history and physical examination on the patient.  I agree with the resident's history, physical examination, and plan of the patient unless otherwise noted. 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 daughter states slightly more confused/sleepier, but has been a little off since being in a rehab facility. no other new trauma or complaints. pt oriented x 2 (baseline) nl finger to nose, nl strength/sensation, ctab, rrr, abd benign, no ttp to any joint with ~1cm erythma spots to b/l knee areas. horizontal lac to forehead. transferred for neuro, neuro consulted recommending neurosurg/trauma.

## 2024-09-23 NOTE — ED PROVIDER NOTE - CLINICAL SUMMARY MEDICAL DECISION MAKING FREE TEXT BOX
84 year old male pt presents to the ED as transfer from [place] secondary to hemorrhagic conversion of right PCA infarct. Repeat CT head stable. Pt neuro checks without change. Pt to be admitted to trauma service SICU vs stepdown. Family made aware. Vitals stable at this time 84 year old male pt presents to the ED as transfer from [place] secondary to hemorrhagic conversion of right PCA infarct. Repeat CT head stable. Pt neuro checks without change. Pt to be admitted to trauma service SICU vs stepdown. Family made aware. Vitals stable at this time    SICU admission

## 2024-09-23 NOTE — H&P ADULT - ASSESSMENT
85 yo M with PMHx of CVA now on plavix, GERD, HTN, HLD, presents as a transfer to the ED after a fall.  His previous region of cerebral infarct from 8/24/24 now has a hemorrhagic component which is stable on 6 hr repeat scan.  He has a superficial forehead abrasion with no other traumatic injuries.    PLAN  - Admit to SICU vs stepdown vs floor pending NSGY recs regarding neurovascular checks  - Syncopal workup  - Geriatric trauma consult  - Antiplatelets held per NSGY  - Chemical DVT witheld per NSGY  - 24h CTH tomorrow at 1400  - NPO until cleared by NSGY    Pt discussed with Dr. Zacarias

## 2024-09-23 NOTE — ED ADULT NURSE NOTE - OBJECTIVE STATEMENT
Patient presents to ED complaining of head laceration from a fall that occurred at an unknown time last night. Patient states he does not know what time he fell. As per patient he tripped and fell while trying to go to the bathroom. As per family at bedside, Ramon called her stating he fell but did not send him to hospital, when family was visiting patient today, patient family states the laceration looked bad and patient is on plavix so she called the ambulance. Patient NIHSS of 0. Patient unknown last known well. As per family, patient at baseline of ambulation status. Patient denies nausea, denies chest pain, denies difficulty breathing. As per physician, code stroke not initiated.

## 2024-09-23 NOTE — CONSULT NOTE ADULT - SUBJECTIVE AND OBJECTIVE BOX
HPI: M PMH   p/w heme conversion of old stroke bed s/p fall @ 330am 9/23 @ [facility]. Patient was admitted in august for     84-year-old male history of occipital infarct, DM, HTN, HLD, GERD came to the emergency room from the nursing home because patient fell @ 330am and had an abrasion on the forehead. Patient had neck pain w/ no bony tenderness to palpation and has a prior visual impairment from the stroke but no other symptoms. Patient denies all other ROS. Patient was switched to Plavix after stroke for prevention. PRU pending for evaluation of reversal.      Vital Signs Last 24 Hrs  T(C): 36.7 (23 Sep 2024 18:14), Max: 36.7 (23 Sep 2024 18:14)  T(F): 98 (23 Sep 2024 18:14), Max: 98 (23 Sep 2024 18:14)  HR: 58 (23 Sep 2024 19:29) (50 - 65)  BP: 116/75 (23 Sep 2024 19:29) (112/63 - 136/74)  BP(mean): 79 (23 Sep 2024 18:20) (75 - 91)  RR: 18 (23 Sep 2024 19:29) (11 - 21)  SpO2: 100% (23 Sep 2024 19:29) (96% - 100%)    Parameters below as of 23 Sep 2024 19:29  Patient On (Oxygen Delivery Method): room air        PHYSICAL EXAM:  GENERAL: no acute distress  HEAD: 1.5in laceration on forehead, normocephalic  NEURO: AOx2 (Name and Year), Pupils b/l 2R, L homonymous hemianopsia, R ? nasal field cut, EOMI, FC, REYES 5/5  CHEST/LUNG: Clear to auscultation bilaterally; no rales, rhonchi, wheezing, or rubs  ABDOMEN: Soft, nontender, nondistended;   EXTREMITIES:  2+ peripheral pulses, no clubbing, cyanosis, or edema  SKIN: Warm, dry; no rashes or lesions      LABS:                        11.6   2.00  )-----------( 152      ( 23 Sep 2024 12:30 )             34.1     09-23    145  |  109[H]  |  49[H]  ----------------------------<  95  4.4   |  25  |  2.38[H]    Ca    9.7      23 Sep 2024 12:30    TPro  8.5[H]  /  Alb  3.4  /  TBili  0.6  /  DBili  x   /  AST  16  /  ALT  20  /  AlkPhos  74  09-23    PT/INR - ( 23 Sep 2024 12:30 )   PT: 13.6 sec;   INR: 1.17 ratio         PTT - ( 23 Sep 2024 12:30 )  PTT:29.9 sec  Urinalysis Basic - ( 23 Sep 2024 12:30 )    Color: x / Appearance: x / SG: x / pH: x  Gluc: 95 mg/dL / Ketone: x  / Bili: x / Urobili: x   Blood: x / Protein: x / Nitrite: x   Leuk Esterase: x / RBC: x / WBC x   Sq Epi: x / Non Sq Epi: x / Bacteria: x            RADIOLOGY & ADDITIONAL TESTS:    < from: CT Brain Stroke Protocol (09.23.24 @ 13:53) >    IMPRESSION:    1.  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    Dr. Norton called report to TIGIST LEDBETTER 09/23/24 01:48 PM.    Patient Name:LAUREN COLLINS  MRN: [id number], Accession: [id number]  DOS: 09/23/24 01:53 PM    --- End of Report ---    < end of copied text >       HPI: 84-year-old male history of occipital infarct, DM, HTN, HLD, GERD came to the emergency room from the nursing home because patient fell @ 330am and had an abrasion on the forehead. Patient had neck pain w/ no bony tenderness to palpation and has a prior visual impairment from the stroke but no other symptoms. Patient denies all other ROS. Patient was switched to Plavix after stroke for prevention. PRU pending for evaluation of reversal.      Vital Signs Last 24 Hrs  T(C): 36.7 (23 Sep 2024 18:14), Max: 36.7 (23 Sep 2024 18:14)  T(F): 98 (23 Sep 2024 18:14), Max: 98 (23 Sep 2024 18:14)  HR: 58 (23 Sep 2024 19:29) (50 - 65)  BP: 116/75 (23 Sep 2024 19:29) (112/63 - 136/74)  BP(mean): 79 (23 Sep 2024 18:20) (75 - 91)  RR: 18 (23 Sep 2024 19:29) (11 - 21)  SpO2: 100% (23 Sep 2024 19:29) (96% - 100%)    Parameters below as of 23 Sep 2024 19:29  Patient On (Oxygen Delivery Method): room air        PHYSICAL EXAM:  GENERAL: no acute distress  HEAD: 1.5in laceration on forehead, normocephalic  NEURO: AOx2 (Name and Year), Pupils b/l 2R, L homonymous hemianopsia, R ? nasal field cut, EOMI, FC, REYES 5/5  CHEST/LUNG: Clear to auscultation bilaterally; no rales, rhonchi, wheezing, or rubs  ABDOMEN: Soft, nontender, nondistended;   EXTREMITIES:  2+ peripheral pulses, no clubbing, cyanosis, or edema  SKIN: Warm, dry; no rashes or lesions      LABS:                        11.6   2.00  )-----------( 152      ( 23 Sep 2024 12:30 )             34.1     09-23    145  |  109[H]  |  49[H]  ----------------------------<  95  4.4   |  25  |  2.38[H]    Ca    9.7      23 Sep 2024 12:30    TPro  8.5[H]  /  Alb  3.4  /  TBili  0.6  /  DBili  x   /  AST  16  /  ALT  20  /  AlkPhos  74  09-23    PT/INR - ( 23 Sep 2024 12:30 )   PT: 13.6 sec;   INR: 1.17 ratio         PTT - ( 23 Sep 2024 12:30 )  PTT:29.9 sec  Urinalysis Basic - ( 23 Sep 2024 12:30 )    Color: x / Appearance: x / SG: x / pH: x  Gluc: 95 mg/dL / Ketone: x  / Bili: x / Urobili: x   Blood: x / Protein: x / Nitrite: x   Leuk Esterase: x / RBC: x / WBC x   Sq Epi: x / Non Sq Epi: x / Bacteria: x            RADIOLOGY & ADDITIONAL TESTS:    < from: CT Brain Stroke Protocol (09.23.24 @ 13:53) >    IMPRESSION:    1.  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    Dr. Norton called report to TIGIST LEDBETTER 09/23/24 01:48 PM.    Patient Name:LAUREN COLLINS  MRN: [id number], Accession: [id number], [id number], [id number]  DOS: 09/23/24 01:53 PM    --- End of Report ---    < end of copied text >

## 2024-09-23 NOTE — CONSULT NOTE ADULT - ASSESSMENT
84y M/F PMH occipital infarct, DM, HTN, HLD, GERD on plavix presents to [facility] with subacute right PCA infarct with hemorrhagic conversion     - D/w attending  - No acute neurosurgical intervention at this time due to clinical examination  - Trauma Evaluation  - Q1 neuro checks  - Pending PRU, will determine if reversal of AP is needed  - CTH @ 6hrs and CTH @ 2pm 9/24 for 24hr AP stability scan, CT non-con C-spine for subjective neck pain  - SBP goals:   - Sodium goals: 135-145  - Chemical DVT not ok until cleared by NSGY team  - Anticoagulation/Antiplatelet therapy not ok until cleared by NSGY team    40 minutes time spent evaluating/treating patient with medical issues and reviewing data including but limited to vital signs/labs/imaging/imaging results, clinical examination, discussion of treatment/plan with attending, multidisciplinary team and consultants, discussing plan/goals of care with patient/family

## 2024-09-23 NOTE — ED PROVIDER NOTE - PHYSICAL EXAMINATION
General:     NAD, well-nourished, well-appearing  Head:     NC/2 cm abrasion on the forehead EOMI, oral mucosa moist  Neck:     trachea midline  Lungs:     CTA b/l, no w/r/r  CVS:     S1S2, RRR, no m/g/r  Abd:     +BS, s/nt/nd, no organomegaly  Ext:    2+ radial and pedal pulses, no c/c/e  Neuro: AAOx3, no sensory/motor deficits  NIH stroke scale is equal to 0 patient unable to ambulate even with assistance

## 2024-09-23 NOTE — ED PROVIDER NOTE - OBJECTIVE STATEMENT
84-year-old male patient with a history of right PCA infarct diagnosed 1 month ago at a hospital in [place], presented to [facility] emergency department today secondary to fall last night, laceration to frontal head, CT head and plain close showed right acute on chronic PCA infarct with small hemorrhagic conversion.  Patient was transferred for neurology/neurosurgery consult.  As per patient's daughter at bedside, patient has been at his baseline mental status.  Currently on aspirin and Plavix.

## 2024-09-23 NOTE — ED PROVIDER NOTE - CARE PLAN
1 Goal:	Right PCA infarct with the hemorrhagic conversion   Principal Discharge DX:	Occipital stroke  Goal:	Right PCA infarct with the hemorrhagic conversion

## 2024-09-23 NOTE — ED PROVIDER NOTE - CLINICAL SUMMARY MEDICAL DECISION MAKING FREE TEXT BOX
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

## 2024-09-23 NOTE — ED ADULT TRIAGE NOTE - HEART RATE (BEATS/MIN)
Pt asleep. Wakes easily. States pain 5/10 and will take po pain medication prior to discharge.   To phase 2 care per stretcher with RN assist. 65

## 2024-09-23 NOTE — H&P ADULT - NSICDXPASTMEDICALHX_GEN_ALL_CORE_FT
PAST MEDICAL HISTORY:  Cerebrovascular accident (CVA)     Gastroesophageal reflux disease, esophagitis presence not specified     HLD (hyperlipidemia)     HTN (hypertension)     Hypothyroid     Type 2 diabetes mellitus on metformin

## 2024-09-23 NOTE — ED ADULT TRIAGE NOTE - ARRIVAL INFO ADDITIONAL COMMENTS
Patient BIB EMS from [facility] for evaluation of head injury sustained after fall last night at 2200. Patient describes as mechanical fall forward, denies LOC. Takes Plavis daily. Patient called the desk of the rehab and was told he would be reevaluated in the morning. Laceration with dried blood noted to forehead.

## 2024-09-23 NOTE — ED PROVIDER NOTE - PROGRESS NOTE DETAILS
Spoke with Dr. Khoury, recommending Neurosurg consult and trauma given acuity of stroke and hemorrhagic conversion Nsurg aware of pt, will come see Trauma made aware of pt Neurosurgery recommending repeat CT scan in 6 and 24 hours, respectively.  Patient hemodynamically stable at this time, neurochecks and.  Pending trauma consult CT head stable, no acute cervical fx. Trauma at bedside, stepdown vs SICU to Dr. Zacarias Given the significant and immediate threats to this patient based on initial presentation, the benefits of emergency contrast-enhanced CT imaging without obtaining GFR/creatinine serum level results greatly outweigh the potential risk of harm due to contrast-induced nephropathy.

## 2024-09-23 NOTE — ED ADULT TRIAGE NOTE - CHIEF COMPLAINT QUOTE
Transfer from [facility] S/P fall at nursing home at 2am. 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 PT C/o headache at this time. No otherneuro defiticts noted. Placed in CC room.

## 2024-09-23 NOTE — H&P ADULT - NSHPLABSRESULTS_GEN_ALL_CORE
11.6   2.00  )-----------( 152      ( 23 Sep 2024 12:30 )             34.1     09-23    145  |  109[H]  |  49[H]  ----------------------------<  95  4.4   |  25  |  2.38[H]    Ca    9.7      23 Sep 2024 12:30    TPro  8.5[H]  /  Alb  3.4  /  TBili  0.6  /  DBili  x   /  AST  16  /  ALT  20  /  AlkPhos  74  09-23    IMPRESSION:    CT HEAD:  Stable appearing hemorrhagic infarct within the right PCA territory,   compared with earlier examination also performed today at approximately   1:50 PM.    CT CERVICAL SPINE:  No acute fracture or traumatic subluxation.    Multi-level degenerative changes, as described level by level in detail   above.  If there is clinical concern for myelopathy or radiculopathy,   consider further evaluation via MR imaging of the cervical spine,   provided the patient has no contraindications.    < end of copied text >

## 2024-09-23 NOTE — ED PROVIDER NOTE - PHYSICAL EXAMINATION
Const: Slow to answer questions. Awake, alert and oriented to person, place, & time. In no acute distress.   HEENT: Oropharynx clear without exudates or erythema. Moist mucous membranes  Eyes: PERRLA. No scleral icterus. EOMI.  Neck:. Soft and supple. Full ROM without pain. No cervical midline tenderness.   Cardiac: Regular rate and regular rhythm. +S1/S2. No murmurs, rubs or gallops. Peripheral pulses 2+ and symmetric. No LE edema.  Resp: Speaking in full sentences, breath sounds equal and clear bilaterally. No wheezes, rales or rhonchi.  Abd: Soft, non-tender, non-distended.  No guarding or rebound.  MSK: Spine midline and non-tender.   Skin:  2 cm laceration to frontal scalp with no active bleeding.  Starting to scab over.  Scattered ecchymoses to bilateral upper and lower extremities.  Lymph: No cervical lymphadenopathy.  Neuro:   Slow to respond to commands, 5 out of 5 strength, bilateral upper and lower extremities, no dysmetria,   left homonomous hemianopia, possibly unchanged from baseline

## 2024-09-23 NOTE — ED ADULT NURSE REASSESSMENT NOTE - NS ED NURSE REASSESS COMMENT FT1
Assumed care of pt at 19:15 as stated in report from CORNEL carmen. Charting as noted. Patient A&O x2 baseline, denies pain/discomfort, denies CP/SOB. Updated on the plan of care. Call bell within reach, bed locked in lowest position. IV site flushed w/ NS. No redness, swelling or pain noted to site. No signs of acute distress noted, safety maintained. Pt remains on CM in NSR. Neuro at bedside pt acting at baseline with increased lethargy as per family.

## 2024-09-23 NOTE — H&P ADULT - HISTORY OF PRESENT ILLNESS
Mr. 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  He reports going to the bathroom, feeling wobbly and then falling.  He reports he occasionally experiences similar wobbling sxs.  He endorses headstrike and has a linear abrasion across his forehead with a small hematoma.  He denies LOC.  The patients daughter reports hearing about the fall from his nursing home this morning and then calling the ambulance after consulting with the Pt's neurologist.  He is AOx1 with L visual deficits which the daughter reports is his baseline since his stroke in August.  He denies pain anywhere except mild soreness in his R trapezius.

## 2024-09-24 ENCOUNTER — RESULT REVIEW (OUTPATIENT)
Age: 84
End: 2024-09-24

## 2024-09-24 LAB
ALBUMIN SERPL ELPH-MCNC: 3.6 G/DL — SIGNIFICANT CHANGE UP (ref 3.3–5.2)
ALP SERPL-CCNC: 68 U/L — SIGNIFICANT CHANGE UP (ref 40–120)
ALT FLD-CCNC: 13 U/L — SIGNIFICANT CHANGE UP
ANION GAP SERPL CALC-SCNC: 12 MMOL/L — SIGNIFICANT CHANGE UP (ref 5–17)
ANION GAP SERPL CALC-SCNC: 14 MMOL/L — SIGNIFICANT CHANGE UP (ref 5–17)
ANION GAP SERPL CALC-SCNC: 9 MMOL/L — SIGNIFICANT CHANGE UP (ref 5–17)
ANISOCYTOSIS BLD QL: SLIGHT — SIGNIFICANT CHANGE UP
APTT BLD: 30.3 SEC — SIGNIFICANT CHANGE UP (ref 24.5–35.6)
AST SERPL-CCNC: 15 U/L — SIGNIFICANT CHANGE UP
BASOPHILS # BLD AUTO: 0 K/UL — SIGNIFICANT CHANGE UP (ref 0–0.2)
BASOPHILS NFR BLD AUTO: 0 % — SIGNIFICANT CHANGE UP (ref 0–2)
BILIRUB DIRECT SERPL-MCNC: 0.2 MG/DL — SIGNIFICANT CHANGE UP (ref 0–0.3)
BILIRUB INDIRECT FLD-MCNC: 0.4 MG/DL — SIGNIFICANT CHANGE UP (ref 0.2–1)
BILIRUB SERPL-MCNC: 0.6 MG/DL — SIGNIFICANT CHANGE UP (ref 0.4–2)
BUN SERPL-MCNC: 36.4 MG/DL — HIGH (ref 8–20)
BUN SERPL-MCNC: 38.1 MG/DL — HIGH (ref 8–20)
BUN SERPL-MCNC: 40.7 MG/DL — HIGH (ref 8–20)
CALCIUM SERPL-MCNC: 9.1 MG/DL — SIGNIFICANT CHANGE UP (ref 8.4–10.5)
CHLORIDE SERPL-SCNC: 102 MMOL/L — SIGNIFICANT CHANGE UP (ref 96–108)
CHLORIDE SERPL-SCNC: 105 MMOL/L — SIGNIFICANT CHANGE UP (ref 96–108)
CHLORIDE SERPL-SCNC: 106 MMOL/L — SIGNIFICANT CHANGE UP (ref 96–108)
CO2 SERPL-SCNC: 22 MMOL/L — SIGNIFICANT CHANGE UP (ref 22–29)
CO2 SERPL-SCNC: 22 MMOL/L — SIGNIFICANT CHANGE UP (ref 22–29)
CO2 SERPL-SCNC: 24 MMOL/L — SIGNIFICANT CHANGE UP (ref 22–29)
CREAT SERPL-MCNC: 1.94 MG/DL — HIGH (ref 0.5–1.3)
CREAT SERPL-MCNC: 1.96 MG/DL — HIGH (ref 0.5–1.3)
CREAT SERPL-MCNC: 2.18 MG/DL — HIGH (ref 0.5–1.3)
EGFR: 29 ML/MIN/1.73M2 — LOW
EGFR: 33 ML/MIN/1.73M2 — LOW
EGFR: 34 ML/MIN/1.73M2 — LOW
EOSINOPHIL # BLD AUTO: 0.02 K/UL — SIGNIFICANT CHANGE UP (ref 0–0.5)
EOSINOPHIL NFR BLD AUTO: 0.9 % — SIGNIFICANT CHANGE UP (ref 0–6)
GIANT PLATELETS BLD QL SMEAR: PRESENT — SIGNIFICANT CHANGE UP
GLUCOSE BLDC GLUCOMTR-MCNC: 101 MG/DL — HIGH (ref 70–99)
GLUCOSE BLDC GLUCOMTR-MCNC: 127 MG/DL — HIGH (ref 70–99)
GLUCOSE BLDC GLUCOMTR-MCNC: 79 MG/DL — SIGNIFICANT CHANGE UP (ref 70–99)
GLUCOSE SERPL-MCNC: 79 MG/DL — SIGNIFICANT CHANGE UP (ref 70–99)
GLUCOSE SERPL-MCNC: 80 MG/DL — SIGNIFICANT CHANGE UP (ref 70–99)
GLUCOSE SERPL-MCNC: 83 MG/DL — SIGNIFICANT CHANGE UP (ref 70–99)
HCT VFR BLD CALC: 31.6 % — LOW (ref 39–50)
HGB BLD-MCNC: 10.6 G/DL — LOW (ref 13–17)
INR BLD: 1.31 RATIO — HIGH (ref 0.85–1.18)
LACTATE SERPL-SCNC: 2 MMOL/L — SIGNIFICANT CHANGE UP (ref 0.5–2)
LYMPHOCYTES # BLD AUTO: 1.25 K/UL — SIGNIFICANT CHANGE UP (ref 1–3.3)
LYMPHOCYTES # BLD AUTO: 60.6 % — HIGH (ref 13–44)
MAGNESIUM SERPL-MCNC: 2 MG/DL — SIGNIFICANT CHANGE UP (ref 1.6–2.6)
MANUAL SMEAR VERIFICATION: SIGNIFICANT CHANGE UP
MCHC RBC-ENTMCNC: 30.5 PG — SIGNIFICANT CHANGE UP (ref 27–34)
MCHC RBC-ENTMCNC: 33.5 GM/DL — SIGNIFICANT CHANGE UP (ref 32–36)
MCV RBC AUTO: 91.1 FL — SIGNIFICANT CHANGE UP (ref 80–100)
MICROCYTES BLD QL: SLIGHT — SIGNIFICANT CHANGE UP
MONOCYTES # BLD AUTO: 0.36 K/UL — SIGNIFICANT CHANGE UP (ref 0–0.9)
MONOCYTES NFR BLD AUTO: 17.4 % — HIGH (ref 2–14)
MRSA PCR RESULT.: SIGNIFICANT CHANGE UP
MRSA PCR RESULT.: SIGNIFICANT CHANGE UP
NEUTROPHILS # BLD AUTO: 0.38 K/UL — LOW (ref 1.8–7.4)
NEUTROPHILS NFR BLD AUTO: 18.3 % — LOW (ref 43–77)
NT-PROBNP SERPL-SCNC: 292 PG/ML — SIGNIFICANT CHANGE UP (ref 0–300)
OVALOCYTES BLD QL SMEAR: SLIGHT — SIGNIFICANT CHANGE UP
PHOSPHATE SERPL-MCNC: 3 MG/DL — SIGNIFICANT CHANGE UP (ref 2.4–4.7)
PLAT MORPH BLD: NORMAL — SIGNIFICANT CHANGE UP
PLATELET # BLD AUTO: 140 K/UL — LOW (ref 150–400)
POIKILOCYTOSIS BLD QL AUTO: SLIGHT — SIGNIFICANT CHANGE UP
POLYCHROMASIA BLD QL SMEAR: SLIGHT — SIGNIFICANT CHANGE UP
POTASSIUM SERPL-MCNC: 4.2 MMOL/L — SIGNIFICANT CHANGE UP (ref 3.5–5.3)
POTASSIUM SERPL-MCNC: 4.2 MMOL/L — SIGNIFICANT CHANGE UP (ref 3.5–5.3)
POTASSIUM SERPL-MCNC: 4.3 MMOL/L — SIGNIFICANT CHANGE UP (ref 3.5–5.3)
POTASSIUM SERPL-SCNC: 4.2 MMOL/L — SIGNIFICANT CHANGE UP (ref 3.5–5.3)
POTASSIUM SERPL-SCNC: 4.2 MMOL/L — SIGNIFICANT CHANGE UP (ref 3.5–5.3)
POTASSIUM SERPL-SCNC: 4.3 MMOL/L — SIGNIFICANT CHANGE UP (ref 3.5–5.3)
PROT SERPL-MCNC: 7.4 G/DL — SIGNIFICANT CHANGE UP (ref 6.6–8.7)
PROTHROM AB SERPL-ACNC: 14.4 SEC — HIGH (ref 9.5–13)
RBC # BLD: 3.47 M/UL — LOW (ref 4.2–5.8)
RBC # FLD: 13.1 % — SIGNIFICANT CHANGE UP (ref 10.3–14.5)
RBC BLD AUTO: ABNORMAL
S AUREUS DNA NOSE QL NAA+PROBE: SIGNIFICANT CHANGE UP
S AUREUS DNA NOSE QL NAA+PROBE: SIGNIFICANT CHANGE UP
SODIUM SERPL-SCNC: 138 MMOL/L — SIGNIFICANT CHANGE UP (ref 135–145)
SODIUM SERPL-SCNC: 139 MMOL/L — SIGNIFICANT CHANGE UP (ref 135–145)
SODIUM SERPL-SCNC: 139 MMOL/L — SIGNIFICANT CHANGE UP (ref 135–145)
TROPONIN T, HIGH SENSITIVITY RESULT: 33 NG/L — SIGNIFICANT CHANGE UP (ref 0–51)
TROPONIN T, HIGH SENSITIVITY RESULT: 34 NG/L — SIGNIFICANT CHANGE UP (ref 0–51)
VARIANT LYMPHS # BLD: 2.8 % — SIGNIFICANT CHANGE UP (ref 0–6)
WBC # BLD: 2.07 K/UL — LOW (ref 3.8–10.5)
WBC # FLD AUTO: 2.07 K/UL — LOW (ref 3.8–10.5)

## 2024-09-24 PROCEDURE — 99233 SBSQ HOSP IP/OBS HIGH 50: CPT

## 2024-09-24 PROCEDURE — 70551 MRI BRAIN STEM W/O DYE: CPT | Mod: 26

## 2024-09-24 PROCEDURE — 99223 1ST HOSP IP/OBS HIGH 75: CPT | Mod: FS

## 2024-09-24 PROCEDURE — 70450 CT HEAD/BRAIN W/O DYE: CPT | Mod: 26

## 2024-09-24 PROCEDURE — 99232 SBSQ HOSP IP/OBS MODERATE 35: CPT | Mod: FS

## 2024-09-24 PROCEDURE — 93306 TTE W/DOPPLER COMPLETE: CPT | Mod: 26

## 2024-09-24 RX ORDER — METOPROLOL TARTRATE 50 MG
25 TABLET ORAL DAILY
Refills: 0 | Status: DISCONTINUED | OUTPATIENT
Start: 2024-09-25 | End: 2024-09-29

## 2024-09-24 RX ORDER — HALOPERIDOL LACTATE 2 MG/ML
2.5 CONCENTRATE, ORAL ORAL ONCE
Refills: 0 | Status: COMPLETED | OUTPATIENT
Start: 2024-09-24 | End: 2024-09-24

## 2024-09-24 RX ORDER — METOPROLOL TARTRATE 50 MG
12.5 TABLET ORAL EVERY 12 HOURS
Refills: 0 | Status: DISCONTINUED | OUTPATIENT
Start: 2024-09-24 | End: 2024-09-24

## 2024-09-24 RX ORDER — PANTOPRAZOLE SODIUM 40 MG/1
40 TABLET, DELAYED RELEASE ORAL
Refills: 0 | Status: DISCONTINUED | OUTPATIENT
Start: 2024-09-24 | End: 2024-09-29

## 2024-09-24 RX ORDER — SODIUM CHLORIDE 0.9 % (FLUSH) 0.9 %
500 SYRINGE (ML) INJECTION ONCE
Refills: 0 | Status: COMPLETED | OUTPATIENT
Start: 2024-09-24 | End: 2024-09-24

## 2024-09-24 RX ORDER — ACETAMINOPHEN 325 MG
650 TABLET ORAL EVERY 6 HOURS
Refills: 0 | Status: DISCONTINUED | OUTPATIENT
Start: 2024-09-24 | End: 2024-09-29

## 2024-09-24 RX ORDER — SENNOSIDES 8.6 MG
2 TABLET ORAL AT BEDTIME
Refills: 0 | Status: DISCONTINUED | OUTPATIENT
Start: 2024-09-24 | End: 2024-09-29

## 2024-09-24 RX ADMIN — CHLORHEXIDINE GLUCONATE ORAL RINSE 1 APPLICATION(S): 1.2 SOLUTION DENTAL at 06:09

## 2024-09-24 RX ADMIN — FOLIC ACID 1 MILLIGRAM(S): 1 TABLET ORAL at 11:52

## 2024-09-24 RX ADMIN — Medication 0.4 MILLIGRAM(S): at 21:22

## 2024-09-24 RX ADMIN — Medication 2.5 MILLIGRAM(S): at 01:23

## 2024-09-24 RX ADMIN — Medication 650 MILLIGRAM(S): at 15:33

## 2024-09-24 RX ADMIN — Medication 2 TABLET(S): at 21:21

## 2024-09-24 RX ADMIN — Medication 50 MILLILITER(S): at 04:29

## 2024-09-24 RX ADMIN — Medication 1000 MILLILITER(S): at 00:00

## 2024-09-24 RX ADMIN — Medication 2.5 MILLIGRAM(S): at 04:29

## 2024-09-24 RX ADMIN — ATORVASTATIN CALCIUM 80 MILLIGRAM(S): 10 TABLET, FILM COATED ORAL at 21:22

## 2024-09-24 RX ADMIN — Medication 75 MILLILITER(S): at 21:25

## 2024-09-24 RX ADMIN — Medication 1000 MILLILITER(S): at 02:35

## 2024-09-24 RX ADMIN — Medication 650 MILLIGRAM(S): at 16:33

## 2024-09-24 RX ADMIN — Medication 75 MILLILITER(S): at 06:09

## 2024-09-24 RX ADMIN — Medication 75 MICROGRAM(S): at 06:09

## 2024-09-24 NOTE — GOALS OF CARE CONVERSATION - ADVANCED CARE PLANNING - CONVERSATION DETAILS
Discussed diagnosis, treatment plan, prognosis with patient. Family entered into goals of care conversation voluntarily. Conversation was had over the phone. Daughter Dionne, to make decisions for the patient as patient is unable to make decisions for themself.  Discussed/defined DNR/DNI w/patient/family, including explaining the use of medications/electricity to restart the heart and the use of ETT/mechanical ventilation.  Family is clear that they WOULD want chest compressions, shocks or medications to restart the heart should it stop beating or he have a rhythm not compatible with life.  Family is also clear that they WOULD want to be intubated and put on a ventilator should they require it.     Patient to be: [x] FULL CODE    Total time spent have goals of care conversation and completing appropriate documentation approx. 20 minutes.

## 2024-09-24 NOTE — PROGRESS NOTE ADULT - ASSESSMENT
Mr. 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  He reports going to the bathroom, feeling wobbly and then falling.  He reports he occasionally experiences similar wobbling sxs.  He endorses headstrike and has a linear abrasion across his forehead with a small hematoma.   The patients daughter reports hearing about the fall from his nursing home yesterday  morning and then calling the ambulance after consulting with the Pt's neurologist.  He is AOx1 with L visual deficits He was presented to [facility] CT with stroke hemorrhagic conversion send to [facility], admitted to SICU . consulted by neurosurgery evalaution Pt had seen by neuro stroke team this am , rec MRI of brain     1- s/p Fall at rehab   concern for hemorhagic stroke   pt is with recent R PCA infract bleeding at site of infarct   hold plavix   MR of brain P   skyla for DVT prophylaxis per NS     2- CRISTO on CKD stage 3   baseline cr 1.3-1.6   cont IVF   trending down     3- HTN   cont metoprolol   monitor and adjust medication as needed     4- h/o Bladder tumor  treated with BCG last dose July 2024     5- Hypothyroid   on levothyroxine   check TSH     Dvt prophylaxis add heparin sq     OOB   PT   discharge planing in 1-2 days pending MR and PT     pt does not want him to go back to rehab

## 2024-09-24 NOTE — ED ADULT NURSE NOTE - CHIEF COMPLAINT QUOTE
Transfer from [facility] S/P fall at nursing home at 2am. 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 PT C/o headache at this time. No otherneuro defiticts noted. Placed in CC room.

## 2024-09-24 NOTE — PROGRESS NOTE ADULT - SUBJECTIVE AND OBJECTIVE BOX
INTERVAL HPI/OVERNIGHT EVENTS:      SUBJECTIVE:    ICU Vital Signs Last 24 Hrs  T(C): 36.7 (24 Sep 2024 07:31), Max: 36.7 (23 Sep 2024 18:14)  T(F): 98 (24 Sep 2024 07:31), Max: 98 (23 Sep 2024 18:14)  HR: 53 (24 Sep 2024 07:00) (48 - 88)  BP: 127/69 (24 Sep 2024 07:00) (112/63 - 145/88)  BP(mean): 87 (24 Sep 2024 07:00) (75 - 103)  ABP: --  ABP(mean): --  RR: 15 (24 Sep 2024 07:00) (11 - 21)  SpO2: 100% (24 Sep 2024 07:00) (95% - 100%)    O2 Parameters below as of 24 Sep 2024 04:00  Patient On (Oxygen Delivery Method): room air          I&O's Detail    23 Sep 2024 07:01  -  24 Sep 2024 07:00  --------------------------------------------------------  IN:    sodium chloride 0.9%: 400 mL    Sodium Chloride 0.9% Bolus: 500 mL  Total IN: 900 mL    OUT:    Intermittent Catheterization - Urethral (mL): 900 mL  Total OUT: 900 mL    Total NET: 0 mL          MEDICATIONS  (STANDING):  atorvastatin 80 milliGRAM(s) Oral at bedtime  chlorhexidine 2% Cloths 1 Application(s) Topical <User Schedule>  folic acid 1 milliGRAM(s) Oral daily  insulin lispro (ADMELOG) corrective regimen sliding scale   SubCutaneous Before meals and at bedtime  levothyroxine 75 MICROGram(s) Oral daily  metoprolol tartrate 12.5 milliGRAM(s) Oral every 12 hours  pantoprazole    Tablet 40 milliGRAM(s) Oral before breakfast  sodium chloride 0.9%. 1000 milliLiter(s) (75 mL/Hr) IV Continuous <Continuous>  tamsulosin 0.4 milliGRAM(s) Oral at bedtime    MEDICATIONS  (PRN):  acetaminophen     Tablet .. 650 milliGRAM(s) Oral every 6 hours PRN Mild Pain (1 - 3), Moderate Pain (4 - 6), Severe Pain (7 - 10)    Drug Dosing Weight  Height (cm): 180.3 (24 Sep 2024 01:00)  Weight (kg): 63 (24 Sep 2024 01:00)  BMI (kg/m2): 19.4 (24 Sep 2024 01:00)  BSA (m2): 1.81 (24 Sep 2024 01:00)    LABS:    CBC Full  -  ( 24 Sep 2024 01:40 )  WBC Count : 2.07 K/uL  RBC Count : 3.47 M/uL  Hemoglobin : 10.6 g/dL  Hematocrit : 31.6 %  Platelet Count - Automated : 140 K/uL  Mean Cell Volume : 91.1 fl  Mean Cell Hemoglobin : 30.5 pg  Mean Cell Hemoglobin Concentration : 33.5 gm/dL  Auto Neutrophil # : 0.38 K/uL  Auto Lymphocyte # : 1.25 K/uL  Auto Monocyte # : 0.36 K/uL  Auto Eosinophil # : 0.02 K/uL  Auto Basophil # : 0.00 K/uL  Auto Neutrophil % : 18.3 %  Auto Lymphocyte % : 60.6 %  Auto Monocyte % : 17.4 %  Auto Eosinophil % : 0.9 %  Auto Basophil % : 0.0 %    09-24    139  |  106  |  38.1[H]  ----------------------------<  80  4.2   |  24.0  |  1.96[H]    Ca    9.1      24 Sep 2024 06:12  Phos  3.0     09-24  Mg     2.0     09-24    TPro  7.4  /  Alb  3.6  /  TBili  0.6  /  DBili  0.2  /  AST  15  /  ALT  13  /  AlkPhos  68  09-24    PT/INR - ( 24 Sep 2024 01:40 )   PT: 14.4 sec;   INR: 1.31 ratio         PTT - ( 24 Sep 2024 01:40 )  PTT:30.3 sec  Urinalysis Basic - ( 24 Sep 2024 06:12 )    Color: x / Appearance: x / SG: x / pH: x  Gluc: 80 mg/dL / Ketone: x  / Bili: x / Urobili: x   Blood: x / Protein: x / Nitrite: x   Leuk Esterase: x / RBC: x / WBC x   Sq Epi: x / Non Sq Epi: x / Bacteria: x        Physical Exam:  GENERAL:  NEUROLOGIC:  HEENT:  NECK:  HEART:  CHEST/LUNG:  ABDOMEN:  EXTREMITIES:  SKIN:    PATIENT CARE ACCESS DEVICES:  [ ] Peripheral IV  [ ] Central Venous Line	[ ] R	[ ] L	[ ] IJ	[ ] Fem	[ ] SC	   [ ] Arterial Line		[ ] R	[ ] L	[ ] Fem	[ ] Rad	[ ] Ax	   [ ] PICC:					[ ] Mediport  [ ] Urinary Catheter:   [ ] Necessity of urinary, arterial, and venous catheters discussed           INTERVAL HPI/OVERNIGHT EVENTS:  Repeat CT scan stable at 0600.  1L fluid bolus given.  Haldol 2.5 x 2 given for agitation overnight    SUBJECTIVE:      ICU Vital Signs Last 24 Hrs  T(C): 36.7 (24 Sep 2024 07:31), Max: 36.7 (23 Sep 2024 18:14)  T(F): 98 (24 Sep 2024 07:31), Max: 98 (23 Sep 2024 18:14)  HR: 53 (24 Sep 2024 07:00) (48 - 88)  BP: 127/69 (24 Sep 2024 07:00) (112/63 - 145/88)  BP(mean): 87 (24 Sep 2024 07:00) (75 - 103)  ABP: --  ABP(mean): --  RR: 15 (24 Sep 2024 07:00) (11 - 21)  SpO2: 100% (24 Sep 2024 07:00) (95% - 100%)    O2 Parameters below as of 24 Sep 2024 04:00  Patient On (Oxygen Delivery Method): room air    I&O's Detail    23 Sep 2024 07:01  -  24 Sep 2024 07:00  --------------------------------------------------------  IN:    sodium chloride 0.9%: 400 mL    Sodium Chloride 0.9% Bolus: 500 mL  Total IN: 900 mL    OUT:    Intermittent Catheterization - Urethral (mL): 900 mL  Total OUT: 900 mL    Total NET: 0 mL          MEDICATIONS  (STANDING):  atorvastatin 80 milliGRAM(s) Oral at bedtime  chlorhexidine 2% Cloths 1 Application(s) Topical <User Schedule>  folic acid 1 milliGRAM(s) Oral daily  insulin lispro (ADMELOG) corrective regimen sliding scale   SubCutaneous Before meals and at bedtime  levothyroxine 75 MICROGram(s) Oral daily  metoprolol tartrate 12.5 milliGRAM(s) Oral every 12 hours  pantoprazole    Tablet 40 milliGRAM(s) Oral before breakfast  sodium chloride 0.9%. 1000 milliLiter(s) (75 mL/Hr) IV Continuous <Continuous>  tamsulosin 0.4 milliGRAM(s) Oral at bedtime    MEDICATIONS  (PRN):  acetaminophen     Tablet .. 650 milliGRAM(s) Oral every 6 hours PRN Mild Pain (1 - 3), Moderate Pain (4 - 6), Severe Pain (7 - 10)    Drug Dosing Weight  Height (cm): 180.3 (24 Sep 2024 01:00)  Weight (kg): 63 (24 Sep 2024 01:00)  BMI (kg/m2): 19.4 (24 Sep 2024 01:00)  BSA (m2): 1.81 (24 Sep 2024 01:00)    LABS:    CBC Full  -  ( 24 Sep 2024 01:40 )  WBC Count : 2.07 K/uL  RBC Count : 3.47 M/uL  Hemoglobin : 10.6 g/dL  Hematocrit : 31.6 %  Platelet Count - Automated : 140 K/uL  Mean Cell Volume : 91.1 fl  Mean Cell Hemoglobin : 30.5 pg  Mean Cell Hemoglobin Concentration : 33.5 gm/dL  Auto Neutrophil # : 0.38 K/uL  Auto Lymphocyte # : 1.25 K/uL  Auto Monocyte # : 0.36 K/uL  Auto Eosinophil # : 0.02 K/uL  Auto Basophil # : 0.00 K/uL  Auto Neutrophil % : 18.3 %  Auto Lymphocyte % : 60.6 %  Auto Monocyte % : 17.4 %  Auto Eosinophil % : 0.9 %  Auto Basophil % : 0.0 %    09-24    139  |  106  |  38.1[H]  ----------------------------<  80  4.2   |  24.0  |  1.96[H]    Ca    9.1      24 Sep 2024 06:12  Phos  3.0     09-24  Mg     2.0     09-24    TPro  7.4  /  Alb  3.6  /  TBili  0.6  /  DBili  0.2  /  AST  15  /  ALT  13  /  AlkPhos  68  09-24    PT/INR - ( 24 Sep 2024 01:40 )   PT: 14.4 sec;   INR: 1.31 ratio         PTT - ( 24 Sep 2024 01:40 )  PTT:30.3 sec  Urinalysis Basic - ( 24 Sep 2024 06:12 )    Color: x / Appearance: x / SG: x / pH: x  Gluc: 80 mg/dL / Ketone: x  / Bili: x / Urobili: x   Blood: x / Protein: x / Nitrite: x   Leuk Esterase: x / RBC: x / WBC x   Sq Epi: x / Non Sq Epi: x / Bacteria: x        Physical Exam:  GENERAL:  NEUROLOGIC:  HEENT:  NECK:  HEART:  CHEST/LUNG:  ABDOMEN:  EXTREMITIES:  SKIN:    PATIENT CARE ACCESS DEVICES:  [ ] Peripheral IV  [ ] Central Venous Line	[ ] R	[ ] L	[ ] IJ	[ ] Fem	[ ] SC	   [ ] Arterial Line		[ ] R	[ ] L	[ ] Fem	[ ] Rad	[ ] Ax	   [ ] PICC:					[ ] Mediport  [ ] Urinary Catheter:   [ ] Necessity of urinary, arterial, and venous catheters discussed           INTERVAL HPI/OVERNIGHT EVENTS:  Repeat CT scan stable at 0600.  1L fluid bolus given.  Haldol 2.5 x 2 given for agitation overnight    SUBJECTIVE:  Reports feeling well other than a mild headache    ICU Vital Signs Last 24 Hrs  T(C): 36.7 (24 Sep 2024 07:31), Max: 36.7 (23 Sep 2024 18:14)  T(F): 98 (24 Sep 2024 07:31), Max: 98 (23 Sep 2024 18:14)  HR: 53 (24 Sep 2024 07:00) (48 - 88)  BP: 127/69 (24 Sep 2024 07:00) (112/63 - 145/88)  BP(mean): 87 (24 Sep 2024 07:00) (75 - 103)  ABP: --  ABP(mean): --  RR: 15 (24 Sep 2024 07:00) (11 - 21)  SpO2: 100% (24 Sep 2024 07:00) (95% - 100%)    O2 Parameters below as of 24 Sep 2024 04:00  Patient On (Oxygen Delivery Method): room air    I&O's Detail    23 Sep 2024 07:01  -  24 Sep 2024 07:00  --------------------------------------------------------  IN:    sodium chloride 0.9%: 400 mL    Sodium Chloride 0.9% Bolus: 500 mL  Total IN: 900 mL    OUT:    Intermittent Catheterization - Urethral (mL): 900 mL  Total OUT: 900 mL    Total NET: 0 mL          MEDICATIONS  (STANDING):  atorvastatin 80 milliGRAM(s) Oral at bedtime  chlorhexidine 2% Cloths 1 Application(s) Topical <User Schedule>  folic acid 1 milliGRAM(s) Oral daily  insulin lispro (ADMELOG) corrective regimen sliding scale   SubCutaneous Before meals and at bedtime  levothyroxine 75 MICROGram(s) Oral daily  metoprolol tartrate 12.5 milliGRAM(s) Oral every 12 hours  pantoprazole    Tablet 40 milliGRAM(s) Oral before breakfast  sodium chloride 0.9%. 1000 milliLiter(s) (75 mL/Hr) IV Continuous <Continuous>  tamsulosin 0.4 milliGRAM(s) Oral at bedtime    MEDICATIONS  (PRN):  acetaminophen     Tablet .. 650 milliGRAM(s) Oral every 6 hours PRN Mild Pain (1 - 3), Moderate Pain (4 - 6), Severe Pain (7 - 10)    Drug Dosing Weight  Height (cm): 180.3 (24 Sep 2024 01:00)  Weight (kg): 63 (24 Sep 2024 01:00)  BMI (kg/m2): 19.4 (24 Sep 2024 01:00)  BSA (m2): 1.81 (24 Sep 2024 01:00)    LABS:    CBC Full  -  ( 24 Sep 2024 01:40 )  WBC Count : 2.07 K/uL  RBC Count : 3.47 M/uL  Hemoglobin : 10.6 g/dL  Hematocrit : 31.6 %  Platelet Count - Automated : 140 K/uL  Mean Cell Volume : 91.1 fl  Mean Cell Hemoglobin : 30.5 pg  Mean Cell Hemoglobin Concentration : 33.5 gm/dL  Auto Neutrophil # : 0.38 K/uL  Auto Lymphocyte # : 1.25 K/uL  Auto Monocyte # : 0.36 K/uL  Auto Eosinophil # : 0.02 K/uL  Auto Basophil # : 0.00 K/uL  Auto Neutrophil % : 18.3 %  Auto Lymphocyte % : 60.6 %  Auto Monocyte % : 17.4 %  Auto Eosinophil % : 0.9 %  Auto Basophil % : 0.0 %    09-24    139  |  106  |  38.1[H]  ----------------------------<  80  4.2   |  24.0  |  1.96[H]    Ca    9.1      24 Sep 2024 06:12  Phos  3.0     09-24  Mg     2.0     09-24    TPro  7.4  /  Alb  3.6  /  TBili  0.6  /  DBili  0.2  /  AST  15  /  ALT  13  /  AlkPhos  68  09-24    PT/INR - ( 24 Sep 2024 01:40 )   PT: 14.4 sec;   INR: 1.31 ratio         PTT - ( 24 Sep 2024 01:40 )  PTT:30.3 sec  Urinalysis Basic - ( 24 Sep 2024 06:12 )    Color: x / Appearance: x / SG: x / pH: x  Gluc: 80 mg/dL / Ketone: x  / Bili: x / Urobili: x   Blood: x / Protein: x / Nitrite: x   Leuk Esterase: x / RBC: x / WBC x   Sq Epi: x / Non Sq Epi: x / Bacteria: x        Physical Exam:  GENERAL: Awake, alert, oriented to person and place (hospital) but not to month, oriented to situation - knows that he fell and hit his head  NEUROLOGIC: GCS 14-15, moves all extremities without deficit  HEENT: 2.5 cm long transverse laceration in mid forehead -dried, no bleeding (not sutured)  NECK: Non tender, no step offs  HEART: RRR, bradycardia  CHEST/LUNG: Breathing comfortably on RA, pectus excavatum  ABDOMEN: Soft, non tender, non distended  EXTREMITIES: Warm, no lower extremity edema, b/l knees w/resolving ecchymosis (small circular areas (about 1cm in diameter) w/o associated swelling, ecchymosis or decrease/limit to ROM, right posterior shoulder w/mild resolving ecchymosis, no tenderness or decrease in ROM    PATIENT CARE ACCESS DEVICES:  [x ] Peripheral IV  [ ] Central Venous Line	[ ] R	[ ] L	[ ] IJ	[ ] Fem	[ ] SC	   [ ] Arterial Line		[ ] R	[ ] L	[ ] Fem	[ ] Rad	[ ] Ax	   [ ] PICC:					[ ] Mediport  [ ] Urinary Catheter:   [ ] Necessity of urinary, arterial, and venous catheters discussed

## 2024-09-24 NOTE — CHART NOTE - NSCHARTNOTEFT_GEN_A_CORE
Tertiary Trauma Survey (TTS)    Date of TTS: 09-24-24 @ 12:18                             Admit Date: 09-23-24 @ 23:42          List Injuries Identified to Date:  - hemorrhagic infarct in right PCA territory  - CRISTO      List Operative and Interventional Radiological Procedures:     < from: CT Brain Stroke Protocol (09.23.24 @ 13:53) >    FINDINGS:    There is an acute/subacute infarct in the right PCA territory with   associated hemorrhagic conversion. This is new compared to the prior head   CT. Further characterization with an MRI could be ofvalue to better   characterize the lesion.    There are also small chronic lacunar infarcts in the left basal ganglia.    Moderate generalized cerebral volume loss, with distention of the sulci   and concomitant ex-vacuo ventricular dilatation. Mild-to-moderate   nonspecific low attenuation in the periventricular and subcortical white   matter, likely due to small vessel disease.    No midline shift or herniation. No CT evidence of acute territorial   infarction elsewhere, although MRI with DWI would be more sensitive.    Limited views of the sinuses and mastoids show mild mucosal thickening   without air-fluid levels, likely chronic. The right mastoid is   underdeveloped. Left lens implant. Limited views of the orbits and   visualized soft tissues of the neck, face, scalp, skull base, and   calvarium are otherwise unremarkable.    IMPRESSION:    1.  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      < from: CT Cervical Spine No Cont (09.23.24 @ 21:12) >    FINDINGS:    CT BRAIN:    VENTRICLES AND SULCI: Normal in size and configuration.  INTRA-AXIAL: Findings suggestive of a hemorrhagic right PCA territory   infarct, with hemorrhagic components appearing relatively stable compared   with earlier examination performed at approximately 1:50 PM today  EXTRA-AXIAL: No mass or fluid collection. Basal cisterns are normal in   appearance.    VISUALIZED SINUSES:  Clear.  TYMPANOMASTOID CAVITIES:  Clear.  VISUALIZED ORBITS: Normal.  CALVARIUM: Intact.    MISCELLANEOUS: None.      CT CERVICAL SPINE:    VERTEBRAE:  Normalin height. No acute fracture. Multilevel degenerative   changes including marginal osteophytes.  ALIGNMENT: Mild cervical dextroscoliosis. Grade 1 subluxation at C3-C4,   C5-C6, and C7-T1.  INTERVERTEBRAL DISC SPACES: C2-C3: No significant spinal canal stenosis.   Moderate right neural foraminal narrowing.    C3-C4: Grade 1 subluxation with antral stasis of C3 relative to C4 by 3.5   mm. Posterior osteophyte formation. Cannot exclude encroachment on the   ventral surface of the spinal cord. Moderate to severe bilateral neural   foraminal narrowing. Correlate clinically for compression on the exiting   bilateral C4 nerve roots.    C4-C5: Narrowing of the intervertebral disc space with endplate   degenerative changes. Minimal posterior osteophyte formation which   appears to encroach on the ventral surface of the spinal cord. Mild to   moderate bilateral neural foraminal narrowing.    C5-C6: Grade 1 subluxation with anterolisthesis of C5 relative to C6 by   3.2 mm. Mild posterior osteophyteformation. Cannot exclude encroachment   on the ventral surface of the spinal cord. Moderate right and moderate to   severe left neural foraminal narrowing. Correlate clinically for   compression on the exiting left C6 nerve root.    C6-C7: Narrowingof the intervertebral disc space with endplate   degenerative changes and posterior osteophyte formation. Cannot exclude   encroachment on the ventral surface of the spinal cord. Moderate to   severe right and moderate left neural foraminal narrowing. Correlate   clinically for compression on the exiting right C7 nerve root.    C7-T1: Grade 1 subluxation with anterolisthesis of C7 relative to T1 by   approximately 3.5 mm. No significant spinal canal stenosis or neural   foraminal narrowing.    VISUALIZED LUNGS: Clear.    MISCELLANEOUS:  None.      IMPRESSION:    CT HEAD:  Stable appearing hemorrhagic infarct within the right PCA territory,   compared with earlier examination also performed today at approximately   1:50 PM.    CT CERVICAL SPINE:  No acute fracture or traumatic subluxation.    Multi-level degenerative changes, as described level by level in detail   above.  If there is clinical concern for myelopathy or radiculopathy,   consider further evaluation via MR imaging of the cervical spine,   provided the patient has no contraindications.    CT noncontrast head - pending    Tertiary [X ]  Geriatric Consult [X ]  PTSD [ ]   GOC [ X]   CODE STATUS: Full code       Physical Exam:    Neuro: A and Ox1, with left visual deficits at baseline. Upper and Lower Extremities Sensory/Motor intact B/L.    HEENT: Normal cephalic. Trachea midline.     Pulm/Chest: CTA B/L, equal rise and fall of the chest. Pectus excavatum    Cardiac: NSR     GI / Abdomen: Nontender, nondistended.     Musculoskeletal / Extremities: AROM of the extremities. Cervical/Thoracic/Lumbar spine NTTP with no obvious stepoffs.     Integumentary: 2cm healing forehead laceration. Old ecchymosis to right posterior shoulder. Ecchymosis to bilateral knees.       RADIOLOGICAL FINDINGS REVIEW:      INCIDENTAL FINDINGS:    [X ] No    [ ] Yes, Findings are:        [X ] Tertiary exam complete, there are no new injuries identified    [ ] Tertiary exam done, new injuries identified are:                [ ] Imaging ordered:

## 2024-09-24 NOTE — CONSULT NOTE ADULT - SUBJECTIVE AND OBJECTIVE BOX
CONSULT NOTE    LAUREN HOWARD  [id number]  84yMale    Patient is a 84y old  Male who presents with a chief complaint of Right PCA infarct with hemorrhagic conversion (24 Sep 2024 11:37)    History of Present Illness:   Mr. 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  He reports going to the bathroom, feeling wobbly and then falling.  He reports he occasionally experiences similar wobbling sxs.  He endorses headstrike and has a linear abrasion across his forehead with a small hematoma.  He denies LOC.  The patients daughter reports hearing about the fall from his nursing home this morning and then calling the ambulance after consulting with the Pt's neurologist.  He is AOx1 with L visual deficits which the daughter reports is his baseline since his stroke in August.  He denies pain anywhere except mild soreness in his R trapezius.     PAST MEDICAL & SURGICAL HISTORY:  Hypothyroid  HLD (hyperlipidemia)  HTN (hypertension)  Gastroesophageal reflux disease, esophagitis presence not specified  Type 2 diabetes mellitus on metformin  Cerebrovascular accident (CVA)    H/O arthroscopy of right knee    Allergies: No Known Allergies    Home Medications: Sheela reviewed  acetaminophen 325 mg oral tablet: 2 tab(s) orally every 4 hours, As needed, Temp greater or equal to 38.5C (101.3F) (21 Apr 2020 09:31)  atorvastatin 80 mg oral tablet: 1 tab(s) orally once a day (at bedtime) (23 Sep 2024 23:54)  clopidogrel: 75 milligram(s) orally once a day (23 Sep 2024 22:01)  folic acid 1 mg oral tablet: 1 tab(s) orally once a day (11 Apr 2020 08:57)  levothyroxine 75 mcg (0.075 mg) oral tablet: 1 tab(s) orally once a day (11 Apr 2020 08:57)  metFORMIN 500 mg oral tablet: 500 milligram(s) orally once a day (11 Apr 2020 08:57)  metoprolol succinate 25 mg oral tablet, extended release: 1 tab(s) orally once a day (11 Apr 2020 08:57)  pantoprazole 40 mg oral delayed release tablet: 1 tab(s) orally once a day (11 Apr 2020 08:57)  tamsulosin 0.4 mg oral capsule: 1 cap(s) orally once a day (at bedtime) (21 Apr 2020 09:31)      FAMILY HISTORY:      Social History:      ROS:    MEDICATIONS  (STANDING):  atorvastatin 80 milliGRAM(s) Oral at bedtime  chlorhexidine 2% Cloths 1 Application(s) Topical <User Schedule>  folic acid 1 milliGRAM(s) Oral daily  insulin lispro (ADMELOG) corrective regimen sliding scale   SubCutaneous Before meals and at bedtime  levothyroxine 75 MICROGram(s) Oral daily  metoprolol tartrate 12.5 milliGRAM(s) Oral every 12 hours  pantoprazole    Tablet 40 milliGRAM(s) Oral before breakfast  sodium chloride 0.9%. 1000 milliLiter(s) (75 mL/Hr) IV Continuous <Continuous>  tamsulosin 0.4 milliGRAM(s) Oral at bedtime      MEDICATIONS  (PRN):  acetaminophen     Tablet .. 650 milliGRAM(s) Oral every 6 hours PRN Mild Pain (1 - 3), Moderate Pain (4 - 6), Severe Pain (7 - 10)      OBJECTIVE:  Vital Signs Last 24 Hrs  T(C): 36.7 (24 Sep 2024 15:37), Max: 36.8 (24 Sep 2024 12:00)  T(F): 98.1 (24 Sep 2024 15:37), Max: 98.2 (24 Sep 2024 12:00)  HR: 62 (24 Sep 2024 15:00) (42 - 88)  BP: 152/89 (24 Sep 2024 15:00) (112/63 - 158/82)  BP(mean): 109 (24 Sep 2024 15:00) (78 - 109)  RR: 20 (24 Sep 2024 15:00) (14 - 21)  SpO2: 100% (24 Sep 2024 15:00) (95% - 100%)    Parameters below as of 24 Sep 2024 04:00  Patient On (Oxygen Delivery Method): room air        Orthostatic VS      Daily Height in cm: 180.34 (24 Sep 2024 01:00)    Daily     I&O's Summary    23 Sep 2024 07:01  -  24 Sep 2024 07:00  --------------------------------------------------------  IN: 900 mL / OUT: 900 mL / NET: 0 mL    24 Sep 2024 07:01  -  24 Sep 2024 15:41  --------------------------------------------------------  IN: 375 mL / OUT: 120 mL / NET: 255 mL        PHYSICAL EXAMINATION  General:   HEENT:    NECK:    CVS:   RESP:    GI:    :   MSK:    CNS:    INTEG:    PSYCH:      MONITOR:  CAPILLARY BLOOD GLUCOSE      POCT Blood Glucose.: 79 mg/dL (24 Sep 2024 11:41)                            10.6   2.07  )-----------( 140      ( 24 Sep 2024 01:40 )             31.6     PT/INR - ( 24 Sep 2024 01:40 )   PT: 14.4 sec;   INR: 1.31 ratio         PTT - ( 24 Sep 2024 01:40 )  PTT:30.3 sec  09-24    139  |  105  |  36.4[H]  ----------------------------<  79  4.3   |  22.0  |  1.94[H]    Ca    9.1      24 Sep 2024 11:50  Phos  3.0     09-24  Mg     2.0     09-24    TPro  7.4  /  Alb  3.6  /  TBili  0.6  /  DBili  0.2  /  AST  15  /  ALT  13  /  AlkPhos  68  09-24        Urinalysis Basic - ( 24 Sep 2024 11:50 )    Color: x / Appearance: x / SG: x / pH: x  Gluc: 79 mg/dL / Ketone: x  / Bili: x / Urobili: x   Blood: x / Protein: x / Nitrite: x   Leuk Esterase: x / RBC: x / WBC x   Sq Epi: x / Non Sq Epi: x / Bacteria: x            TTE:  < from: TTE W or WO Ultrasound Enhancing Agent (09.24.24 @ 09:29) >  CONCLUSIONS:      1. Left ventricular systolic function is normal with an ejection fraction of 60 % by Ro's method of disks.   2. There is no evidence of a left ventricular thrombus.  3. Basal inferolateral segment is abnormal.   4. Normal right ventricular cavity size and normal right ventricular systolic function.   5. Moderate aortic regurgitation.   6. Estimated pulmonary artery systolic pressure is 23 mmHg, consistent with normal pulmonary artery pressure.   7. No prior echocardiogram is available for comparison.   8. No pericardial effusion seen.    < end of copied text >    RADIOLOGY    < from: CT Brain Stroke Protocol (09.23.24 @ 13:53) >  IMPRESSION:    1.  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    < end of copied text >    < from: CT Cervical Spine No Cont (09.23.24 @ 21:12) >    IMPRESSION:    CT HEAD:  Stable appearing hemorrhagic infarct within the right PCA territory,   compared with earlier examination also performed today at approximately   1:50 PM.    CT CERVICAL SPINE:  No acute fracture or traumatic subluxation.    < end of copied text >    < from: CT Head No Cont (09.24.24 @ 14:57) >  IMPRESSION:    1.  No significant change, without interval rebleeding.    < end of copied text >          Geriatric Screening:    Functional Assessment: [ ] Independent [ ] Assistance [ ] Total care [ ] Non-ambulatory    [ ] Fall risks identified:    [ ] High risk medications identified:    [ ] Probable osteoporosis    [ ] Possible osteomalacia    [ ] Increased delirium risk    [ ] Delirium and other risks can be reduced by:    -early ambulation    -minimizing "tethers" - IV, oxygen, catheters, etc    -avoiding hypnotics and sedatives    -maintaining hydration/nutrition    -avoid anticholinergics - diphenhydramine, etc    -pain control    -supportive environment    Advanced Directives: [ ] DNR [ ] No feeding tube [ ] MOLST in chart [ ] MOLST completed today [ ] Unknown

## 2024-09-24 NOTE — PATIENT PROFILE ADULT - FALL HARM RISK - HARM RISK INTERVENTIONS
Assistance with ambulation/Assistance OOB with selected safe patient handling equipment/Communicate Risk of Fall with Harm to all staff/Discuss with provider need for PT consult/Monitor for mental status changes/Monitor gait and stability/Move patient closer to nurses' station/Reinforce activity limits and safety measures with patient and family/Reorient to person, place and time as needed/Tailored Fall Risk Interventions/Toileting schedule using arm’s reach rule for commode and bathroom/Use of alarms - bed, chair and/or voice tab/Visual Cue: Yellow wristband and red socks/Bed in lowest position, wheels locked, appropriate side rails in place/Call bell, personal items and telephone in reach/Instruct patient to call for assistance before getting out of bed or chair/Non-slip footwear when patient is out of bed/[place] to call system/Physically safe environment - no spills, clutter or unnecessary equipment/Purposeful Proactive Rounding/Room/bathroom lighting operational, light cord in reach

## 2024-09-24 NOTE — PATIENT PROFILE ADULT - NSPRESCRUSEDDRG_GEN_A_NUR
Medication:   Requested Prescriptions     Pending Prescriptions Disp Refills    busPIRone (BUSPAR) 10 MG tablet [Pharmacy Med Name: BUSPIRONE HCL 10 MG TABLET] 270 tablet 1     Sig: TAKE 1 TABLET BY MOUTH 3 TIMES DAILY AS NEEDED (ANXIETY).        Last Filled:  6/22/23    Patient Phone Number: [phone] (home)     Last appt: 6/22/2023   Next appt: Visit date not found    Last OARRS:        No data to display                   No

## 2024-09-24 NOTE — ED ADULT NURSE NOTE - NS ED NURSE DISCH DISPOSITION
LM to inform patient that schedules are full through September and we will call to schedule again once there is availability.    Admitted

## 2024-09-24 NOTE — CONSULT NOTE ADULT - SUBJECTIVE AND OBJECTIVE BOX
Preliminary note, official note pending attending review/signature.                               [facility] Stroke Team    CC: Right PCA infarct with hemorrhagic conversion  HPI:  Patient is a pleasant 85 y/o Male with a PMHx of Right occipital infarct on 8/24/24 now on Plavix for one week, DM, HTN, HLD, GERD, who presented to [facility] ED on 9/23/24 following a fall at nursing home around 3am that morning. He reported going to the bathroom, feeling wobbly and then falling.  He reported he occasionally experiences similar wobbling symptoms.  He endorsed a headstrike and has a linear abrasion across his forehead with a small hematoma.  He denies LOC.  The patient's daughter reported hearing about the fall from his nursing home that morning and then called the ambulance after consulting with the Pt's neurologist. He is AOx1 with Left visual deficits which the daughter reports is his baseline since his stroke in August.  He denies pain anywhere except mild soreness in his Right trapezius.     PAST MEDICAL & SURGICAL HISTORY:  Hypothyroid  HLD (hyperlipidemia)  HTN (hypertension)  Gastroesophageal reflux disease, esophagitis presence not specified  Type 2 diabetes mellitus on metformin  Cerebrovascular accident (CVA)  H/O arthroscopy of right knee    MEDICATIONS  (STANDING):  atorvastatin 80 milliGRAM(s) Oral at bedtime  chlorhexidine 2% Cloths 1 Application(s) Topical <User Schedule>  folic acid 1 milliGRAM(s) Oral daily  insulin lispro (ADMELOG) corrective regimen sliding scale   SubCutaneous Before meals and at bedtime  levothyroxine 75 MICROGram(s) Oral daily  metoprolol tartrate 12.5 milliGRAM(s) Oral every 12 hours  pantoprazole    Tablet 40 milliGRAM(s) Oral before breakfast  sodium chloride 0.9%. 1000 milliLiter(s) (75 mL/Hr) IV Continuous <Continuous>  tamsulosin 0.4 milliGRAM(s) Oral at bedtime    MEDICATIONS  (PRN):  acetaminophen     Tablet .. 650 milliGRAM(s) Oral every 6 hours PRN Mild Pain (1 - 3), Moderate Pain (4 - 6), Severe Pain (7 - 10)    Allergies  No Known Allergies    Intolerances  N/A    SOCIAL HISTORY:  Denies tobacco, alcohol, drug use    FAMILY HISTORY:  N/A    ROS: 14 point ROS negative other than what is present in HPI or below    Vital Signs Last 24 Hrs  T(C): 36.7 (24 Sep 2024 07:31), Max: 36.7 (23 Sep 2024 18:14)  T(F): 98 (24 Sep 2024 07:31), Max: 98 (23 Sep 2024 18:14)  HR: 51 (24 Sep 2024 10:00) (46 - 88)  BP: 158/82 (24 Sep 2024 10:00) (112/63 - 158/82)  BP(mean): 101 (24 Sep 2024 10:00) (75 - 103)  RR: 15 (24 Sep 2024 10:00) (11 - 21)  SpO2: 99% (24 Sep 2024 10:00) (95% - 100%)    Parameters below as of 24 Sep 2024 04:00  Patient On (Oxygen Delivery Method): room air      PHYSICAL EXAM:   General: NAD    Detailed Neurologic Exam:    Mental status: The patient is awake and alert and has normal attention span. The patient is oriented to self only. The patient is able to name objects, follow commands, repeat sentences.    Cranial nerves: Pupils equal and react symmetrically to light. +Left visual field cut. Extraocular motion is full with no nystagmus. There is no ptosis. Facial sensation is intact. Facial musculature is symmetric. Palate elevates symmetrically. Tongue is midline.    Motor: There is normal bulk and tone.  There is no tremor.  Strength is 5/5 in the right arm and leg.   Strength is 5/5 in the left arm and leg. Slight pronator drift LUE.     Sensation: Intact to light touch in 4 extremities    Reflexes: 1-2+ throughout and plantar responses are flexor.    Cerebellar: There is no dysmetria on finger to nose testing.    Gait: Deferred    NIH SS:   DATE: 9/24/24  TIME: 0930  1A: Level of consciousness (0-3): 0  1B: Questions (0-2): 1  1C: Commands (0-2): 0  2: Gaze (0-2): 0  3: Visual fields (0-3): 1  4: Facial palsy (0-3): 0  MOTOR:  5A: Left arm motor drift (0-4): 0  5B: Right arm motor drift (0-4): 0  6A: Left leg motor drift (0-4): 0  6B: Right leg motor drift (0-4): 0  7: Limb ataxia (0-2): 0  SENSORY:  8: Sensation (0-2): 0  SPEECH:  9: Language (0-3): 0  10: Dysarthria (0-2): 0  EXTINCTION:  11: Extinction/inattention (0-2): 0    TOTAL SCORE: 2  prehospital mRS=3-4 (from nursing home, ambulatory)      LABS:                         10.6   2.07  )-----------( 140      ( 24 Sep 2024 01:40 )             31.6       09-24    139  |  106  |  38.1[H]  ----------------------------<  80  4.2   |  24.0  |  1.96[H]    Ca    9.1      24 Sep 2024 06:12  Phos  3.0     09-24  Mg     2.0     09-24    TPro  7.4  /  Alb  3.6  /  TBili  0.6  /  DBili  0.2  /  AST  15  /  ALT  13  /  AlkPhos  68  09-24      PT/INR - ( 24 Sep 2024 01:40 )   PT: 14.4 sec;   INR: 1.31 ratio         PTT - ( 24 Sep 2024 01:40 )  PTT:30.3 sec    LDL PENDING  HgbA1c PENDING     RADIOLOGY & ADDITIONAL STUDIES (independently reviewed unless otherwise noted):  CT Brain Stroke Protocol (09.23.24 @ 13:53)  IMPRESSION:  1.  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    CT Head No Cont (09.23.24 @ 21:12)  IMPRESSION:  CT HEAD:  Stable appearing hemorrhagic infarct within the right PCA territory,   compared with earlier examination also performed today at approximately   1:50 PM.    CT CERVICAL SPINE:  No acute fracture or traumatic subluxation.    Multi-level degenerative changes, as described level by level in detail   above.  If there is clinical concern for myelopathy or radiculopathy,   consider further evaluation via MR imaging of the cervical spine,   provided the patient has no contraindications.                            [facility] Stroke Team    CC: Right PCA infarct with hemorrhagic conversion  HPI:  Patient is a pleasant 83 y/o Male with a PMHx of Right occipital infarct on 8/24/24 now on Plavix for one week, DM, HTN, HLD, GERD, who presented to [facility] ED on 9/23/24 following a fall at nursing home around 3am that morning. He reported going to the bathroom, feeling wobbly and then falling.  He reported he occasionally experiences similar wobbling symptoms.  He endorsed a headstrike and has a linear abrasion across his forehead with a small hematoma.  He denies LOC.  The patient's daughter reported hearing about the fall from his nursing home that morning and then called the ambulance after consulting with the Pt's neurologist. He is AOx1 with Left visual deficits which the daughter reports is his baseline since his stroke in August.  He denies pain anywhere except mild soreness in his Right trapezius.     PAST MEDICAL & SURGICAL HISTORY:  Hypothyroid  HLD (hyperlipidemia)  HTN (hypertension)  Gastroesophageal reflux disease, esophagitis presence not specified  Type 2 diabetes mellitus on metformin  Cerebrovascular accident (CVA)  H/O arthroscopy of right knee    MEDICATIONS  (STANDING):  atorvastatin 80 milliGRAM(s) Oral at bedtime  chlorhexidine 2% Cloths 1 Application(s) Topical <User Schedule>  folic acid 1 milliGRAM(s) Oral daily  insulin lispro (ADMELOG) corrective regimen sliding scale   SubCutaneous Before meals and at bedtime  levothyroxine 75 MICROGram(s) Oral daily  metoprolol tartrate 12.5 milliGRAM(s) Oral every 12 hours  pantoprazole    Tablet 40 milliGRAM(s) Oral before breakfast  sodium chloride 0.9%. 1000 milliLiter(s) (75 mL/Hr) IV Continuous <Continuous>  tamsulosin 0.4 milliGRAM(s) Oral at bedtime    MEDICATIONS  (PRN):  acetaminophen     Tablet .. 650 milliGRAM(s) Oral every 6 hours PRN Mild Pain (1 - 3), Moderate Pain (4 - 6), Severe Pain (7 - 10)    Allergies  No Known Allergies    Intolerances  N/A    SOCIAL HISTORY:  Denies tobacco, alcohol, drug use    FAMILY HISTORY:  N/A    ROS: 14 point ROS negative other than what is present in HPI or below    Vital Signs Last 24 Hrs  T(C): 36.7 (24 Sep 2024 07:31), Max: 36.7 (23 Sep 2024 18:14)  T(F): 98 (24 Sep 2024 07:31), Max: 98 (23 Sep 2024 18:14)  HR: 51 (24 Sep 2024 10:00) (46 - 88)  BP: 158/82 (24 Sep 2024 10:00) (112/63 - 158/82)  BP(mean): 101 (24 Sep 2024 10:00) (75 - 103)  RR: 15 (24 Sep 2024 10:00) (11 - 21)  SpO2: 99% (24 Sep 2024 10:00) (95% - 100%)    Parameters below as of 24 Sep 2024 04:00  Patient On (Oxygen Delivery Method): room air      PHYSICAL EXAM:   General: NAD    Detailed Neurologic Exam:    Mental status: The patient is awake and alert and has normal attention span. The patient is oriented to self only. The patient is able to name objects, follow commands, repeat sentences.    Cranial nerves: Pupils equal and react symmetrically to light. +Left visual field cut. Extraocular motion is full with no nystagmus. There is no ptosis. Facial sensation is intact. Facial musculature is symmetric. Palate elevates symmetrically. Tongue is midline.    Motor: There is normal bulk and tone.  There is no tremor.  Strength is 5/5 in the right arm and leg.   Strength is 5/5 in the left arm and leg. Slight pronator drift LUE.     Sensation: Intact to light touch in 4 extremities    Reflexes: 1-2+ throughout and plantar responses are flexor.    Cerebellar: There is no dysmetria on finger to nose testing.    Gait: Deferred    [facility]   DATE: 9/24/24  TIME: 0930  1A: Level of consciousness (0-3): 0  1B: Questions (0-2): 2  1C: Commands (0-2): 0  2: Gaze (0-2): 0  3: Visual fields (0-3): 2  4: Facial palsy (0-3): 0  MOTOR:  5A: Left arm motor drift (0-4): 0  5B: Right arm motor drift (0-4): 0  6A: Left leg motor drift (0-4): 0  6B: Right leg motor drift (0-4): 0  7: Limb ataxia (0-2): 0  SENSORY:  8: Sensation (0-2): 0  SPEECH:  9: Language (0-3): 0  10: Dysarthria (0-2): 0  EXTINCTION:  11: Extinction/inattention (0-2): 0    TOTAL SCORE: 4  prehospital mRS=3-4 (from nursing home, ambulatory)      LABS:                         10.6   2.07  )-----------( 140      ( 24 Sep 2024 01:40 )             31.6       09-24    139  |  106  |  38.1[H]  ----------------------------<  80  4.2   |  24.0  |  1.96[H]    Ca    9.1      24 Sep 2024 06:12  Phos  3.0     09-24  Mg     2.0     09-24    TPro  7.4  /  Alb  3.6  /  TBili  0.6  /  DBili  0.2  /  AST  15  /  ALT  13  /  AlkPhos  68  09-24      PT/INR - ( 24 Sep 2024 01:40 )   PT: 14.4 sec;   INR: 1.31 ratio         PTT - ( 24 Sep 2024 01:40 )  PTT:30.3 sec    LDL PENDING  HgbA1c PENDING     RADIOLOGY & ADDITIONAL STUDIES (independently reviewed unless otherwise noted):  CT Brain Stroke Protocol (09.23.24 @ 13:53)  IMPRESSION:  1.  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    CT Head No Cont (09.23.24 @ 21:12)  IMPRESSION:  CT HEAD:  Stable appearing hemorrhagic infarct within the right PCA territory,   compared with earlier examination also performed today at approximately   1:50 PM.    CT CERVICAL SPINE:  No acute fracture or traumatic subluxation.    Multi-level degenerative changes, as described level by level in detail   above.  If there is clinical concern for myelopathy or radiculopathy,   consider further evaluation via MR imaging of the cervical spine,   provided the patient has no contraindications.

## 2024-09-24 NOTE — PROGRESS NOTE ADULT - ASSESSMENT
Neuro:   Cardiovascular:   Pulmonary:   Heme:   GI:   :   ID:   Fluids:   Endocrine/Electrolytes:   Skin:   Lines:   Code Status:     Patient seen and examined during multi-disciplinary rounds from 0700 to 1200.           Neuro: 6hr CTH stable w/hemorrhagic infarct -periventricular-does not appear to be traumatic in nature, 24 repeat CT pending for 1400, at baseline mental status, neurology evaluation pending  Cardiovascular: Restarted on home metoprolol, troponin unremarkable, repeat bending, HD stable  Pulmonary: Breathing comfortably on RA  Heme: P2y12 of 120, H/H slightly decreased but adequate, ASA and plavix off, chemical prophylaxis to start 24 hours after stability scan  GI: Carb consistent diet  : Cr slowly improving, (in August noted to be in low 1), CRISTO recovering, intermittent cath with adequate urine output, DTV, on home flomax  ID: Afebrile, neutropenia noted, no antibiotics   Fluids: NS given low PO, HLIV when tolerating PO  Endocrine/Electrolytes: RISS,   Skin: Intact  Lines: PIV  Code Status: GOC conversation w/daughter per ACP -to be full code -see GOC node  Tertiary Exam  Geriatric Eval pending    Patient seen and examined during multi-disciplinary rounds from 0700 to 1200.   84 year old gentleman transferred from [facility] w/hx of an occipital infarct 8/24/24 w/residual L visual field defects, on plavix for one week, Dm, HTN, HLD, BPH, hypothyroidism, and GERD who was admitted 9/23 after a fall at home in the bathroom after 'feeling wobbly' where patient sustained an acute/subacute hemorrhagic infarct in the R PCA territory.  CRISTO noted on admission.    Neuro: 6hr CTH stable w/hemorrhagic infarct -periventricular-does not appear to be traumatic in nature, 24 repeat CT pending for 1400, at baseline mental status, neurology evaluation pending, continue q1 hr neuro checks at present and f/u w/NSGY post CT scan at 1400  Cardiovascular: Restarted on home metoprolol, troponin unremarkable, repeat bending, HD stable  Pulmonary: Breathing comfortably on RA  Heme: P2y12 of 120, H/H slightly decreased but adequate, ASA and plavix off, chemical prophylaxis to start 24 hours after stability scan  GI: Carb consistent diet, bowel regimen  : Cr slowly improving, (in August noted to be in low 1), CRISTO recovering, intermittent cath with adequate urine output, DTV, on home flomax  ID: Afebrile, neutropenia noted, no antibiotics   Fluids: NS given low PO, HLIV when tolerating PO  Endocrine/Electrolytes: RISS,   Skin: Intact  Lines: PIV  Code Status: GOC conversation w/daughter per ACP -to be full code -see GOC note  Tertiary Exam  Geriatric Eval pending    Patient seen and examined during multi-disciplinary rounds from 0700 to 1200.

## 2024-09-24 NOTE — PROGRESS NOTE ADULT - SUBJECTIVE AND OBJECTIVE BOX
Acceptance Note / Consult note       Patient is a 84y old  Male admitted with fall at rehab r/o hemorrhagic CVA         Patient seen and examined at bedside, sleeping answer questions eyes close , confused to place   daughter is at the bedside provided most of history   Pt was in rehab after stroke last month , was supposed to be discharged home today   had  a fall at rehab overnight 9/23 when she saw him yesterday am in rehab [facility] and transferred from [facility]         ALLERGIES:  No Known Allergies    MEDICATIONS  (STANDING):  atorvastatin 80 milliGRAM(s) Oral at bedtime  chlorhexidine 2% Cloths 1 Application(s) Topical <User Schedule>  folic acid 1 milliGRAM(s) Oral daily  insulin lispro (ADMELOG) corrective regimen sliding scale   SubCutaneous Before meals and at bedtime  levothyroxine 75 MICROGram(s) Oral daily  metoprolol tartrate 12.5 milliGRAM(s) Oral every 12 hours  pantoprazole    Tablet 40 milliGRAM(s) Oral before breakfast  senna 2 Tablet(s) Oral at bedtime  sodium chloride 0.9%. 1000 milliLiter(s) (75 mL/Hr) IV Continuous <Continuous>  tamsulosin 0.4 milliGRAM(s) Oral at bedtime    MEDICATIONS  (PRN):  acetaminophen     Tablet .. 650 milliGRAM(s) Oral every 6 hours PRN Mild Pain (1 - 3), Moderate Pain (4 - 6), Severe Pain (7 - 10)    Vital Signs Last 24 Hrs  T(F): 98.1 (24 Sep 2024 15:37), Max: 98.2 (24 Sep 2024 12:00)  HR: 62 (24 Sep 2024 15:00) (42 - 88)  BP: 152/89 (24 Sep 2024 15:00) (112/63 - 158/82)  RR: 20 (24 Sep 2024 15:00) (14 - 21)  SpO2: 100% (24 Sep 2024 15:00) (95% - 100%)  I&O's Summary    23 Sep 2024 07:01  -  24 Sep 2024 07:00  --------------------------------------------------------  IN: 900 mL / OUT: 900 mL / NET: 0 mL    24 Sep 2024 07:01  -  24 Sep 2024 16:32  --------------------------------------------------------  IN: 600 mL / OUT: 520 mL / NET: 80 mL        PHYSICAL EXAM:  General: sleepy , confused , no distress  Neck: supple   Lungs: CTA bilateral   Cardio: RRR, S1/S2, No murmur  Abdomen: Soft, Nontender, Nondistended; Bowel sounds present  Extremities: No calf tenderness, No pitting edema  Neuro confused to place at baseline since stroke per daughter   pt able to follow commands , MS all extremites 5/5   slow responses   Skin:  2cm healing forehead laceration    LABS:                        10.6   2.07  )-----------( 140      ( 24 Sep 2024 01:40 )             31.6     09-24    139  |  105  |  36.4  ----------------------------<  79  4.3   |  22.0  |  1.94    Ca    9.1      24 Sep 2024 11:50  Phos  3.0     09-24  Mg     2.0     09-24    TPro  7.4  /  Alb  3.6  /  TBili  0.6  /  DBili  0.2  /  AST  15  /  ALT  13  /  AlkPhos  68  09-24          PT/INR - ( 24 Sep 2024 01:40 )   PT: 14.4 sec;   INR: 1.31 ratio         PTT - ( 24 Sep 2024 01:40 )  PTT:30.3 sec  Lactate, Blood: 2.0 mmol/L (09-24 @ 01:40)      CARDIAC MARKERS ( 23 Sep 2024 12:30 )  x     / 12.2 ng/L / x     / x     / x                        POCT Blood Glucose.: 127 mg/dL (24 Sep 2024 15:49)  POCT Blood Glucose.: 79 mg/dL (24 Sep 2024 11:41)      Urinalysis Basic - ( 24 Sep 2024 11:50 )    Color: x / Appearance: x / SG: x / pH: x  Gluc: 79 mg/dL / Ketone: x  / Bili: x / Urobili: x   Blood: x / Protein: x / Nitrite: x   Leuk Esterase: x / RBC: x / WBC x   Sq Epi: x / Non Sq Epi: x / Bacteria: x          RADIOLOGY & ADDITIONAL TESTS:  cc< from: CT Head No Cont (09.24.24 @ 14:57) >    IMPRESSION:    1.  No significant change, without interval rebleeding.        Patient Name: LAUREN COLLINS  MRN: [id number], Accession: [id number]  DOS: 09/24/24 02:57 PM        < from: CT Cervical Spine No Cont (09.23.24 @ 21:12) >  CT CERVICAL SPINE:  No acute fracture or traumatic subluxation.    Multi-level degenerative changes, as described level by level in detail   above.  If there is clinical concern for myelopathy or radiculopathy,   consider further evaluation via MR imaging of the cervical spine,   provided the patient has no contraindications.        --- End of Report ---    < end of copied text >    < from: CT Brain Stroke Protocol (09.23.24 @ 13:53) >  IMPRESSION:    1.  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      < end of copied text >      < from: NM PET/CT Oncology FDG WB, Inital (05.21.24 @ 18:02) >  IMPRESSION: Abnormal skull-to-thigh FDG-PET/CT scan.    1. Few small FDG-avid mediastinal and bilateral hilar lymph nodes are   nonspecific. A benign etiology is favored.    2. Resolution of patchy groundglass opacities seen within both lungs on   CT chest dated 4/11/2020.  Cluster of tiny nodules and peripheral right middle lobe may represent   mucoid impacted distal airways. A 1 month follow-up with dedicated CT of   chest is recommended for further evaluation.    3. Enlarged prostate gland with nonspecific focus of increased FDG   activity in left/mid apical region. Urology consultation is recommended   to exclude neoplasm.    4. Indeterminate FDG-avid focus in right inferolateral aspect of the body   of sternum, without corresponding abnormality on CT. No lytic lesion.    --- End of Report ---      < end of copied text >

## 2024-09-24 NOTE — PROGRESS NOTE ADULT - SUBJECTIVE AND OBJECTIVE BOX
NEUROSUGERY PA PROGRESS NOTE  HPI:  Mr. 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  He reports going to the bathroom, feeling wobbly and then falling.  He reports he occasionally experiences similar wobbling sxs.  He endorses headstrike and has a linear abrasion across his forehead with a small hematoma.  He denies LOC.  The patients daughter reports hearing about the fall from his nursing home this morning and then calling the ambulance after consulting with the Pt's neurologist.  He is AOx1 with L visual deficits which the daughter reports is his baseline since his stroke in August.  He denies pain anywhere except mild soreness in his R trapezius.  (23 Sep 2024 21:53)    INTERVAL HPI/OVERNIGHT EVENTS:  84y Male seen laying in bed. Denies HA, n/v, changes in vision.      Vital Signs Last 24 Hrs  T(C): 36.7 (24 Sep 2024 07:31), Max: 36.7 (23 Sep 2024 18:14)  T(F): 98 (24 Sep 2024 07:31), Max: 98 (23 Sep 2024 18:14)  HR: 51 (24 Sep 2024 10:00) (46 - 88)  BP: 158/82 (24 Sep 2024 10:00) (112/63 - 158/82)  BP(mean): 101 (24 Sep 2024 10:00) (75 - 103)  RR: 15 (24 Sep 2024 10:00) (11 - 21)  SpO2: 99% (24 Sep 2024 10:00) (95% - 100%)  Parameters below as of 24 Sep 2024 04:00  Patient On (Oxygen Delivery Method): room air    PHYSICAL EXAM:  GENERAL: NAD, well-groomed  MENTAL STATUS: AAO x3 (with options); Awake; Opens eyes spontaneously; Appropriately conversant without aphasia; following commands  CRANIAL NERVES: PERRL. EOMI without nystagmus. Face symmetric.  Hearing grossly intact. Speech clear.   MOTOR: strength 5/5 b/l upper and lower extremities. No drift  SENSATION: grossly intact to light touch all extremities  COORDINATION: Gait not visualized    LABS:                        10.6   2.07  )-----------( 140      ( 24 Sep 2024 01:40 )             31.6     09-24    139  |  106  |  38.1[H]  ----------------------------<  80  4.2   |  24.0  |  1.96[H]    Ca    9.1      24 Sep 2024 06:12  Phos  3.0     09-24  Mg     2.0     09-24    TPro  7.4  /  Alb  3.6  /  TBili  0.6  /  DBili  0.2  /  AST  15  /  ALT  13  /  AlkPhos  68  09-24    PT/INR - ( 24 Sep 2024 01:40 )   PT: 14.4 sec;   INR: 1.31 ratio         PTT - ( 24 Sep 2024 01:40 )  PTT:30.3 sec  Urinalysis Basic - ( 24 Sep 2024 06:12 )    Color: x / Appearance: x / SG: x / pH: x  Gluc: 80 mg/dL / Ketone: x  / Bili: x / Urobili: x   Blood: x / Protein: x / Nitrite: x   Leuk Esterase: x / RBC: x / WBC x   Sq Epi: x / Non Sq Epi: x / Bacteria: x    09-23 @ 07:01  -  09-24 @ 07:00  --------------------------------------------------------  IN: 900 mL / OUT: 900 mL / NET: 0 mL      RADIOLOGY & ADDITIONAL TESTS:  < from: CT Head No Cont (09.23.24 @ 21:12) >  IMPRESSION:  CT HEAD:  Stable appearing hemorrhagic infarct within the right PCA territory,   compared with earlier examination also performed today at approximately   1:50 PM.  CT CERVICAL SPINE:  No acute fracture or traumatic subluxation.  Multi-level degenerative changes, as described level by level in detail   above.  If there is clinical concern for myelopathy or radiculopathy,   consider further evaluation via MR imaging of the cervical spine,   provided the patient has no contraindications.  --- End of Report ---  < end of copied text >

## 2024-09-24 NOTE — PROGRESS NOTE ADULT - ASSESSMENT
84y M/F PMH occipital infarct, DM, HTN, HLD, GERD on plavix presents to [facility] with subacute right PCA infarct with hemorrhagic conversion   Exam stable. 6 hour scan stable.      Plan:  - 24 hour scan @2pm  - No acute neurosurgical intervention at this time due to clinical examination  - Q1 neuro checks  - SBP goals:   - Sodium goals: 135-145  - Chemical DVT not ok until cleared by NSGY team  - Anticoagulation/Antiplatelet therapy not ok until cleared by NSGY team    - case and plan discussed with Dr. Eugene.

## 2024-09-24 NOTE — PATIENT PROFILE ADULT - FUNCTIONAL ASSESSMENT - BASIC MOBILITY 6.
2-calculated by average/Not able to assess (calculate score using [facility] averaging method)

## 2024-09-24 NOTE — CONSULT NOTE ADULT - ASSESSMENT
ASSESSMENT:   Patient is a pleasant 85 y/o Male with a PMHx of Right occipital infarct on 8/24/24 now on Plavix for one week, DM, HTN, HLD, GERD, who presented to [facility] ED on 9/23/24 following a fall at nursing home around 3am that morning. He reported going to the bathroom, feeling wobbly and then falling.  He reported he occasionally experiences similar wobbling symptoms.  He endorsed a headstrike and has a linear abrasion across his forehead with a small hematoma.  He denies LOC.  The patient's daughter reported hearing about the fall from his nursing home that morning and then called the ambulance after consulting with the Pt's neurologist. He is AOx1 with Left visual deficits which the daughter reports is his baseline since his stroke in August.  He denies pain anywhere except mild soreness in his Right trapezius.     Ischemic stroke from ~1 month ago with hemorrhagic transformation. Etiology of infarct unknown, need to discuss obtaining records from patient's prior hospitalization for stroke to determine if etiology was discovered or what level of investigation was performed. Patient does not know which hospital he was for stroke w/u or what testing was done, states his daughter Samina would have this information.    NEURO:   -Neurologically patient at baseline per daughter   -Continue close monitoring for neurologic deterioration    -Stroke neuro checks q 1 hour pending f/u CTs  -SBP goal normotension, avoiding rapid fluctuations and hypertensive/hypotensive episodes   -ANTITHROMBOTIC THERAPY: Held per neurosurgery pending f/u CTs  -STATIN THERAPY: Atorvastatin 80mg PO daily, obtain Lipid Panel, titrate statin to LDL goal less than 70  -Obtain HgA1C  -Obtain MRI Brain w/o  -Dysphagia screen: PASS  -Physical therapy/OT/Speech eval/treatment  -Check TTE, cardiac monitoring w/ telemetry for now, further evaluation pending findings of noted workup, +/- ILR (depending on what determined etiology was at prior hospital and what w/u was performed)  -DVT ppx: Heparin s.c [] LMWH [] SCD[x]    -Na Goal: 135-145   -Monitor for si/sx of infection   -Stroke education     OTHER:  Condition and plan of care d/w patient, questions and concerns addressed.     DISPOSITION: Rehab or home depending on PT eval once stable and workup is complete      CORE MEASURES:        Admission NIHSS: 2     Tenecteplase : [] YES [x] NO      LDL/A1C: PENDING     Depression Screen- if depression hx and/or present      Statin Therapy: Atorvastatin 80mg      Dysphagia Screen: [x] PASS [] FAIL     Smoking [] YES [x] NO      Afib [] YES [x] NO     Stroke Education [] YES [] NO [x] PENDING

## 2024-09-24 NOTE — CHART NOTE - NSCHARTNOTEFT_GEN_A_CORE
SICU TRANSFER NOTE  -----------------------------  ICU Admission Date: 9/23  Transfer Date: 09-24-24 @ 15:44    Admission Diagnosis:   1. Hemorrhagic Infarct of R PCA Territory    Active Problems/injuries:   1. Hemorrhagic Infarct of R PCA Territory  2. CRISTO    Procedures:     Consultants:  [X] Medicine - Geriatric Trauma Consult  [X] Neurosurgery - Hemorrhagic Infarct  [X] Neurology - Hemorrhagic Conversion of Prior Ischemic Stroke      Medications  acetaminophen     Tablet .. 650 milliGRAM(s) Oral every 6 hours PRN  atorvastatin 80 milliGRAM(s) Oral at bedtime  chlorhexidine 2% Cloths 1 Application(s) Topical <User Schedule>  folic acid 1 milliGRAM(s) Oral daily  insulin lispro (ADMELOG) corrective regimen sliding scale   SubCutaneous Before meals and at bedtime  levothyroxine 75 MICROGram(s) Oral daily  metoprolol tartrate 12.5 milliGRAM(s) Oral every 12 hours  pantoprazole    Tablet 40 milliGRAM(s) Oral before breakfast  sodium chloride 0.9%. 1000 milliLiter(s) IV Continuous <Continuous>  tamsulosin 0.4 milliGRAM(s) Oral at bedtime      [X] I attest I have reviewed and reconciled all medications prior to transfer    IV Fluids  sodium chloride 0.9% Bolus:   500 milliLiter(s), IV Bolus, once, infuse over 30 Minute(s), Stop After 1 Doses  sodium chloride 0.9%.: Solution, 1000 milliLiter(s) infuse at 75 mL/Hr    Indication: Resuscitative fluids    ASSESSMENT: 84 YOM BIBEMS as a transfer from [facility] s/p mechanical fall in the bathroom resulting in hemorrhagic conversion of previous R PCA Infarct    PLAN:  I have discussed this case with Dr. Edwards upon transfer and all questions regarding ICU course were answered.    Neuro:   - Multimodal pain regimen  - Sedation?  - Delirium precautions  - Optimize sleep/wake cycle     CV:   - Maintain MAP > 65  - Continue hemodynamic monitoring  - TTE -> EF 60%  - Syncope workup Negative ->  | Troponin 34 -> 33   - Metoprolol 12.5mg BID -> Can restart home metoprolol dosage of 25mg ER     Pulm:   - Maintain sPO2 >92%  - Pulmonary toilet, IS, OOBTC    GI/Nutrition:   - Tolerating PO intake with Consistent Carbohydrate Diet  - Tube feeds?  - Protonix ppx?  - Bowel regimen  - Monitor bowel movements    /Renal:   #  - Monitor kidney fxn  - Monitor UOP  - Replete electrolytes PRN (Mg >2, Phos >3, K >4)  - Blank for strict I&O?  - Voiding spontaneously?    ID:  #  - Monitor fever curve  - Monitor for leukocytosis  - Antibiotics? Dates?  - Cultures?    Endo:  #  - Monitor blood glucose  - Maintain euglycemia, 140-180  - ISS?  - Insulin gtt?  - Thyroid function/meds?    Heme/DVT Prophylaxis:  #  - SCDs  - Monitor H/H  - Chemical prophylaxis? Lovenox/SQH?    Skin:  - Repositioning for DTI prevention while in bed    Ortho:  #   - Weight Bearing Status:  - Appreciate ortho recs    ENT:  #  - Appreciate ENT recs    Vascular:  #  - Neurovascular checks  - Appreciate vascular recs    Lines:  - Peripheral IV's  - Arterial Line?  - Central Line? TLC/Cordis/Shiley?    Dispo:  - Care per SICU  - PT recs?    Tertiary [ ]  Geriatric Consult [ ]  PTSD [ ]   GOC [ ]   CODE STATUS: SICU TRANSFER NOTE  -----------------------------  ICU Admission Date: 9/23  Transfer Date: 09-24-24 @ 15:44    Admission Diagnosis:   1. Hemorrhagic Infarct of R PCA Territory    Active Problems/injuries:   1. Hemorrhagic Infarct of R PCA Territory  2. CRISTO    Procedures:     Consultants:  [X] Medicine - Geriatric Trauma Consult  [X] Neurosurgery - Hemorrhagic Infarct  [X] Neurology - Hemorrhagic Conversion of Prior Ischemic Stroke      Medications  acetaminophen     Tablet .. 650 milliGRAM(s) Oral every 6 hours PRN  atorvastatin 80 milliGRAM(s) Oral at bedtime  chlorhexidine 2% Cloths 1 Application(s) Topical <User Schedule>  folic acid 1 milliGRAM(s) Oral daily  insulin lispro (ADMELOG) corrective regimen sliding scale   SubCutaneous Before meals and at bedtime  levothyroxine 75 MICROGram(s) Oral daily  metoprolol tartrate 12.5 milliGRAM(s) Oral every 12 hours  pantoprazole    Tablet 40 milliGRAM(s) Oral before breakfast  sodium chloride 0.9%. 1000 milliLiter(s) IV Continuous <Continuous>  tamsulosin 0.4 milliGRAM(s) Oral at bedtime      [X] I attest I have reviewed and reconciled all medications prior to transfer    IV Fluids  sodium chloride 0.9% Bolus:   500 milliLiter(s), IV Bolus, once, infuse over 30 Minute(s), Stop After 1 Doses  sodium chloride 0.9%.: Solution, 1000 milliLiter(s) infuse at 75 mL/Hr    Indication: Resuscitative fluids    ASSESSMENT: 84 YOM BIBEMS as a transfer from [facility] s/p mechanical fall in the bathroom resulting in hemorrhagic conversion of previous R PCA Infarct    PLAN: I have discussed this case with Dr. Edwards upon transfer and all questions regarding ICU course were answered.    Neuro:   - pain regimen with tylenol PO  - Patient A&O x1 with L Visual Deficit - Baseline Since Infarct on 8/24/24  - Delirium precautions  - Optimize sleep/wake cycle     CV:   - Maintain MAP > 65  - Continue hemodynamic monitoring  - TTE -> EF 60%  - Syncope workup Negative ->  | Troponin 34 -> 33   - Metoprolol 12.5mg BID -> Can restart home metoprolol dosage of 25mg ER   - Continue Atorvastatin 80mg qHS    Pulm:   - Maintain sPO2 >92%  - Pulmonary toilet, IS, OOBTC    GI/Nutrition:   - Tolerating PO intake with Consistent Carbohydrate Diet  - Tube feeds?  - Protonix ppx with 40mg qAM  - Bowel regimen  - Monitor bowel movements    /Renal:   - Monitor kidney fxn  - Monitor UOP  - Replete electrolytes PRN (Mg >2, Phos >3, K >4)  - Voiding spontaneously | Received Straight Cath x1 for Residual of 900cc    ID:  - Monitor fever curve  - Monitor for leukocytosis    Endo:  - Monitor blood glucose  - Maintain euglycemia, 140-180  - Continue Levothyroxine 75mcg QD    Heme/DVT Prophylaxis:  - SCDs  - Monitor H/H  - Chemical prophylaxis with Lovenox 9/25  - P2Y12 + -> Was not reversed    Skin:  - Repositioning for DTI prevention while in bed    Lines:  - Peripheral IV's    Dispo:  - Care per medicine  - PT/OT    Tertiary [X]  Geriatric Consult [X]  PTSD [X]   GOC [X]   CODE STATUS: Full Code SICU TRANSFER NOTE  -----------------------------  ICU Admission Date: 9/23  Transfer Date: 09-24-24 @ 15:44    Admission Diagnosis:   1. Hemorrhagic Infarct of R PCA Territory    Active Problems/injuries:   1. Hemorrhagic Infarct of R PCA Territory  2. CRISTO    Procedures:     Consultants:  [X] Medicine - Geriatric Trauma Consult  [X] Neurosurgery - Hemorrhagic Infarct  [X] Neurology - Hemorrhagic Conversion of Prior Ischemic Stroke      Medications  acetaminophen     Tablet .. 650 milliGRAM(s) Oral every 6 hours PRN  atorvastatin 80 milliGRAM(s) Oral at bedtime  chlorhexidine 2% Cloths 1 Application(s) Topical <User Schedule>  folic acid 1 milliGRAM(s) Oral daily  insulin lispro (ADMELOG) corrective regimen sliding scale   SubCutaneous Before meals and at bedtime  levothyroxine 75 MICROGram(s) Oral daily  metoprolol tartrate 12.5 milliGRAM(s) Oral every 12 hours  pantoprazole    Tablet 40 milliGRAM(s) Oral before breakfast  sodium chloride 0.9%. 1000 milliLiter(s) IV Continuous <Continuous>  tamsulosin 0.4 milliGRAM(s) Oral at bedtime      [X] I attest I have reviewed and reconciled all medications prior to transfer    IV Fluids  sodium chloride 0.9% Bolus:   500 milliLiter(s), IV Bolus, once, infuse over 30 Minute(s), Stop After 1 Doses  sodium chloride 0.9%.: Solution, 1000 milliLiter(s) infuse at 75 mL/Hr    Indication: Resuscitative fluids    ASSESSMENT: 84 YOM BIBEMS as a transfer from [facility] s/p mechanical fall in the bathroom resulting in hemorrhagic conversion of previous R PCA Infarct    PLAN: I have discussed this case with Dr. Edwards upon transfer and all questions regarding ICU course were answered.    Neuro:   - pain regimen with tylenol PO  - Patient A&O x1 with L Visual Deficit - Baseline Since Infarct on 8/24/24  - Delirium precautions  - Optimize sleep/wake cycle     CV:   - Maintain MAP > 65  - Continue hemodynamic monitoring  - TTE -> EF 60%  - Syncope workup Negative ->  | Troponin 34 -> 33   - Metoprolol 12.5mg BID -> Can restart home metoprolol dosage of 25mg ER   - Continue Atorvastatin 80mg qHS    Pulm:   - Maintain sPO2 >92%  - Pulmonary toilet, IS, OOBTC    GI/Nutrition:   - Tolerating PO intake with Consistent Carbohydrate Diet  - Protonix ppx with 40mg qAM  - Bowel regimen  - Monitor bowel movements    /Renal:   - Monitor kidney fxn  - Monitor UOP  - Replete electrolytes PRN (Mg >2, Phos >3, K >4)  - Voiding spontaneously | Received Straight Cath x1 for Residual of 900cc  - Downtrending Cr for an CRISTO -> 2.38 upon admission, currently 1.96 -> Baseline Cr around 1.0    ID:  - Monitor fever curve  - Monitor for leukocytosis    Endo:  - Monitor blood glucose  - Maintain euglycemia, 140-180  - Continue Levothyroxine 75mcg QD    Heme/DVT Prophylaxis:  - SCDs  - Monitor H/H  - Chemical prophylaxis with Lovenox 9/25  - P2Y12 + -> Was not reversed    Skin:  - Repositioning for DTI prevention while in bed    Lines:  - Peripheral IV's    Dispo:  - Care per medicine  - PT/OT    Tertiary [X]  Geriatric Consult [X]  PTSD [X]   GOC [X]   CODE STATUS: Full Code

## 2024-09-24 NOTE — PATIENT PROFILE ADULT - TYPE OF COMMUNICATION USED TO ADDRESS HEALTHCARE
Progress Notes by Juju Galarza MD at 04/30/18 08:02 AM     Author:  Juju Galarza MD Service:  (none) Author Type:  Physician     Filed:  04/30/18 08:02 AM Encounter Date:  4/30/2018 Status:  Signed     :  Juju Galarza MD (Physician)            Pt reports to me that mother diagnosed with strep and pt was exposed to her so wants strep testing.     See orders.[CS1.1M]    Electronically Signed by:    Juju Galarza MD , 4/30/2018[CS1.1T]        Revision History        User Key Date/Time User Provider Type Action    > CS1.1 04/30/18 08:02 AM Juju Galarza MD Physician Sign    M - Manual, T - Template
Communication Board

## 2024-09-24 NOTE — CHART NOTE - NSCHARTNOTEFT_GEN_A_CORE
24 hour scan stable.     -okay for DVT ppx from neuro surgery standpoint.   - Hold plavix x 2 weeks  - F/u Dr. Eugene 2 weeks  - further plan per primary team  - Neuro surgery signing off, reconsult as needed    Case and Plan Discussed with Dr. Eugene.

## 2024-09-25 LAB
A1C WITH ESTIMATED AVERAGE GLUCOSE RESULT: 6.2 % — HIGH (ref 4–5.6)
ANION GAP SERPL CALC-SCNC: 14 MMOL/L — SIGNIFICANT CHANGE UP (ref 5–17)
BASOPHILS # BLD AUTO: 0.01 K/UL — SIGNIFICANT CHANGE UP (ref 0–0.2)
BASOPHILS NFR BLD AUTO: 0.4 % — SIGNIFICANT CHANGE UP (ref 0–2)
BUN SERPL-MCNC: 30.4 MG/DL — HIGH (ref 8–20)
CALCIUM SERPL-MCNC: 9 MG/DL — SIGNIFICANT CHANGE UP (ref 8.4–10.5)
CHLORIDE SERPL-SCNC: 107 MMOL/L — SIGNIFICANT CHANGE UP (ref 96–108)
CHOLEST SERPL-MCNC: 99 MG/DL — SIGNIFICANT CHANGE UP
CO2 SERPL-SCNC: 20 MMOL/L — LOW (ref 22–29)
CREAT SERPL-MCNC: 1.71 MG/DL — HIGH (ref 0.5–1.3)
EGFR: 39 ML/MIN/1.73M2 — LOW
EOSINOPHIL # BLD AUTO: 0.05 K/UL — SIGNIFICANT CHANGE UP (ref 0–0.5)
EOSINOPHIL NFR BLD AUTO: 2.1 % — SIGNIFICANT CHANGE UP (ref 0–6)
ESTIMATED AVERAGE GLUCOSE: 131 MG/DL — HIGH (ref 68–114)
GIANT PLATELETS BLD QL SMEAR: PRESENT — SIGNIFICANT CHANGE UP
GLUCOSE BLDC GLUCOMTR-MCNC: 110 MG/DL — HIGH (ref 70–99)
GLUCOSE BLDC GLUCOMTR-MCNC: 116 MG/DL — HIGH (ref 70–99)
GLUCOSE BLDC GLUCOMTR-MCNC: 118 MG/DL — HIGH (ref 70–99)
GLUCOSE BLDC GLUCOMTR-MCNC: 167 MG/DL — HIGH (ref 70–99)
GLUCOSE SERPL-MCNC: 94 MG/DL — SIGNIFICANT CHANGE UP (ref 70–99)
HCT VFR BLD CALC: 33.3 % — LOW (ref 39–50)
HDLC SERPL-MCNC: 38 MG/DL — LOW
HGB BLD-MCNC: 10.6 G/DL — LOW (ref 13–17)
LIPID PNL WITH DIRECT LDL SERPL: 45 MG/DL — SIGNIFICANT CHANGE UP
LYMPHOCYTES # BLD AUTO: 1.25 K/UL — SIGNIFICANT CHANGE UP (ref 1–3.3)
LYMPHOCYTES # BLD AUTO: 57.7 % — HIGH (ref 13–44)
LYMPHOCYTES # SPEC AUTO: 1.7 % — HIGH (ref 0–0)
MAGNESIUM SERPL-MCNC: 1.9 MG/DL — SIGNIFICANT CHANGE UP (ref 1.6–2.6)
MANUAL SMEAR VERIFICATION: SIGNIFICANT CHANGE UP
MCHC RBC-ENTMCNC: 29.6 PG — SIGNIFICANT CHANGE UP (ref 27–34)
MCHC RBC-ENTMCNC: 31.8 GM/DL — LOW (ref 32–36)
MCV RBC AUTO: 93 FL — SIGNIFICANT CHANGE UP (ref 80–100)
METAMYELOCYTES # FLD: 0.4 % — HIGH (ref 0–0)
MONOCYTES # BLD AUTO: 0.24 K/UL — SIGNIFICANT CHANGE UP (ref 0–0.9)
MONOCYTES NFR BLD AUTO: 11.1 % — SIGNIFICANT CHANGE UP (ref 2–14)
MYELOCYTES NFR BLD: 0.9 % — HIGH (ref 0–0)
NEUTROPHILS # BLD AUTO: 0.53 K/UL — LOW (ref 1.8–7.4)
NEUTROPHILS NFR BLD AUTO: 23.5 % — LOW (ref 43–77)
NEUTS BAND # BLD: 0.9 % — SIGNIFICANT CHANGE UP (ref 0–8)
NON HDL CHOLESTEROL: 61 MG/DL — SIGNIFICANT CHANGE UP
OVALOCYTES BLD QL SMEAR: SIGNIFICANT CHANGE UP
PHOSPHATE SERPL-MCNC: 3.3 MG/DL — SIGNIFICANT CHANGE UP (ref 2.4–4.7)
PLAT MORPH BLD: NORMAL — SIGNIFICANT CHANGE UP
PLATELET # BLD AUTO: 151 K/UL — SIGNIFICANT CHANGE UP (ref 150–400)
POIKILOCYTOSIS BLD QL AUTO: SIGNIFICANT CHANGE UP
POLYCHROMASIA BLD QL SMEAR: SLIGHT — SIGNIFICANT CHANGE UP
POTASSIUM SERPL-MCNC: 3.8 MMOL/L — SIGNIFICANT CHANGE UP (ref 3.5–5.3)
POTASSIUM SERPL-SCNC: 3.8 MMOL/L — SIGNIFICANT CHANGE UP (ref 3.5–5.3)
RBC # BLD: 3.58 M/UL — LOW (ref 4.2–5.8)
RBC # FLD: 12.8 % — SIGNIFICANT CHANGE UP (ref 10.3–14.5)
RBC BLD AUTO: ABNORMAL
SMUDGE CELLS # BLD: PRESENT — SIGNIFICANT CHANGE UP
SODIUM SERPL-SCNC: 140 MMOL/L — SIGNIFICANT CHANGE UP (ref 135–145)
TRIGL SERPL-MCNC: 79 MG/DL — SIGNIFICANT CHANGE UP
VARIANT LYMPHS # BLD: 1.3 % — SIGNIFICANT CHANGE UP (ref 0–6)
WBC # BLD: 2.16 K/UL — LOW (ref 3.8–10.5)
WBC # FLD AUTO: 2.16 K/UL — LOW (ref 3.8–10.5)

## 2024-09-25 PROCEDURE — 99233 SBSQ HOSP IP/OBS HIGH 50: CPT | Mod: FS

## 2024-09-25 PROCEDURE — 99233 SBSQ HOSP IP/OBS HIGH 50: CPT | Mod: GC

## 2024-09-25 RX ORDER — ATORVASTATIN CALCIUM 10 MG/1
40 TABLET, FILM COATED ORAL AT BEDTIME
Refills: 0 | Status: DISCONTINUED | OUTPATIENT
Start: 2024-09-25 | End: 2024-09-29

## 2024-09-25 RX ORDER — 5-HYDROXYTRYPTOPHAN (5-HTP) 100 MG
3 TABLET,DISINTEGRATING ORAL AT BEDTIME
Refills: 0 | Status: DISCONTINUED | OUTPATIENT
Start: 2024-09-25 | End: 2024-09-29

## 2024-09-25 RX ORDER — OLANZAPINE 2.5 MG
2.5 TABLET ORAL ONCE
Refills: 0 | Status: COMPLETED | OUTPATIENT
Start: 2024-09-25 | End: 2024-09-25

## 2024-09-25 RX ORDER — OLANZAPINE 2.5 MG
2.5 TABLET ORAL EVERY 8 HOURS
Refills: 0 | Status: DISCONTINUED | OUTPATIENT
Start: 2024-09-25 | End: 2024-09-29

## 2024-09-25 RX ORDER — OLANZAPINE 2.5 MG
2.5 TABLET ORAL EVERY 8 HOURS
Refills: 0 | Status: DISCONTINUED | OUTPATIENT
Start: 2024-09-25 | End: 2024-09-25

## 2024-09-25 RX ADMIN — FOLIC ACID 1 MILLIGRAM(S): 1 TABLET ORAL at 11:44

## 2024-09-25 RX ADMIN — Medication 2.5 MILLIGRAM(S): at 11:44

## 2024-09-25 RX ADMIN — PANTOPRAZOLE SODIUM 40 MILLIGRAM(S): 40 TABLET, DELAYED RELEASE ORAL at 06:15

## 2024-09-25 RX ADMIN — Medication 75 MICROGRAM(S): at 06:16

## 2024-09-25 RX ADMIN — ATORVASTATIN CALCIUM 40 MILLIGRAM(S): 10 TABLET, FILM COATED ORAL at 21:40

## 2024-09-25 RX ADMIN — Medication 25 MILLIGRAM(S): at 06:15

## 2024-09-25 RX ADMIN — Medication 1: at 12:46

## 2024-09-25 RX ADMIN — Medication 2.5 MILLIGRAM(S): at 20:09

## 2024-09-25 RX ADMIN — Medication 2 TABLET(S): at 21:40

## 2024-09-25 RX ADMIN — Medication 2.5 MILLIGRAM(S): at 19:08

## 2024-09-25 RX ADMIN — CHLORHEXIDINE GLUCONATE ORAL RINSE 1 APPLICATION(S): 1.2 SOLUTION DENTAL at 06:16

## 2024-09-25 RX ADMIN — Medication 0.4 MILLIGRAM(S): at 21:40

## 2024-09-25 NOTE — PROGRESS NOTE ADULT - ATTENDING COMMENTS
patient confused , agitated this am   not following commands   mri head noted   pt / speech eval   zyprexa im prn for agitation

## 2024-09-25 NOTE — PROGRESS NOTE ADULT - SUBJECTIVE AND OBJECTIVE BOX
Preliminary note, official recommendations pending attending review/signature   [facility] Stroke Team  Progress Note     HPI:  Patient is a pleasant 85 y/o Male with a PMHx of Right occipital infarct on 8/24/24 now on Plavix for one week, DM, HTN, HLD, GERD, who presented to [facility] ED on 9/23/24 following a fall at nursing home around 3am that morning. He reported going to the bathroom, feeling wobbly and then falling.  He reported he occasionally experiences similar wobbling symptoms.  He endorsed a headstrike and has a linear abrasion across his forehead with a small hematoma.  He denies LOC.  The patient's daughter reported hearing about the fall from his nursing home that morning and then called the ambulance after consulting with the Pt's neurologist. He is AOx1 with Left visual deficits which the daughter reports is his baseline since his stroke in August.  He denies pain anywhere except mild soreness in his Right trapezius.     SUBJECTIVE: Patient seen and examined actively walking down hallway with nursing assistant, confused and agitated. No events overnight.  No new neurologic complaints.  ROS reported negative unless otherwise noted.    MEDICATIONS:  acetaminophen     Tablet .. 650 milliGRAM(s) Oral every 6 hours PRN  atorvastatin 80 milliGRAM(s) Oral at bedtime  chlorhexidine 2% Cloths 1 Application(s) Topical <User Schedule>  folic acid 1 milliGRAM(s) Oral daily  insulin lispro (ADMELOG) corrective regimen sliding scale   SubCutaneous Before meals and at bedtime  levothyroxine 75 MICROGram(s) Oral daily  metoprolol succinate ER 25 milliGRAM(s) Oral daily  OLANZapine Injectable 2.5 milliGRAM(s) IntraMuscular every 8 hours PRN  pantoprazole    Tablet 40 milliGRAM(s) Oral before breakfast  senna 2 Tablet(s) Oral at bedtime  tamsulosin 0.4 milliGRAM(s) Oral at bedtime      Vital Signs Last 24 Hrs  T(C): 37.1 (25 Sep 2024 11:37), Max: 37.1 (24 Sep 2024 20:23)  T(F): 98.7 (25 Sep 2024 11:37), Max: 98.8 (24 Sep 2024 20:23)  HR: 59 (25 Sep 2024 11:37) (53 - 81)  BP: 138/72 (25 Sep 2024 11:37) (123/84 - 152/89)  BP(mean): 92 (24 Sep 2024 19:00) (90 - 109)  RR: 18 (25 Sep 2024 11:37) (11 - 21)  SpO2: 98% (25 Sep 2024 11:37) (98% - 100%)    Parameters below as of 25 Sep 2024 11:37  Patient On (Oxygen Delivery Method): room air      PHYSICAL EXAM:  General: No acute distress    NEUROLOGICAL EXAM:  Mental status: The patient is awake and alert and has normal attention span. The patient is oriented to self only. The patient is able to name objects, follow commands, repeat sentences.    Cranial nerves: Pupils equal and react symmetrically to light. +Left visual field cut. Extraocular motion is full with no nystagmus. There is no ptosis. Facial sensation is intact. Facial musculature is symmetric. Palate elevates symmetrically. Tongue is midline.    Motor: There is normal bulk and tone.  There is no tremor.  Strength is 5/5 in the right arm and leg.   Strength is 5/5 in the left arm and leg. Slight pronator drift LUE.     Sensation: Intact to light touch in 4 extremities    Reflexes: 1-2+ throughout and plantar responses are flexor.    Cerebellar: There is no dysmetria on finger to nose testing.    Gait: Deferred        LABS:                        10.6   2.16  )-----------( 151      ( 25 Sep 2024 05:59 )             33.3    09-25    140  |  107  |  30.4[H]  ----------------------------<  94  3.8   |  20.0[L]  |  1.71[H]    Ca    9.0      25 Sep 2024 05:59  Phos  3.3     09-25  Mg     1.9     09-25    TPro  7.4  /  Alb  3.6  /  TBili  0.6  /  DBili  0.2  /  AST  15  /  ALT  13  /  AlkPhos  68  09-24  PT/INR - ( 24 Sep 2024 01:40 )   PT: 14.4 sec;   INR: 1.31 ratio         PTT - ( 24 Sep 2024 01:40 )  PTT:30.3 sec    LDL 45  HgbA1c 6.1    RADIOLOGY & ADDITIONAL STUDIES (independently reviewed unless otherwise noted):  MR Head No Cont (09.24.24 @ 20:06)  IMPRESSION:  Subacute infarct in the right posterior cerebral artery vascular   territory with hemorrhagic conversion appears grossly unchanged from CT   of 09/24/2024.    CT Head No Cont (09.24.24 @ 14:57)  IMPRESSION:  1.  No significant change, without interval rebleeding.    TTE W or WO Ultrasound Enhancing Agent (09.24.24 @ 09:29)  CONCLUSIONS:   1. Left ventricular systolic function is normal with an ejection fraction of 60 % by Ro's method of disks.   2. There is no evidence of a left ventricular thrombus.  3. Basal inferolateral segment is abnormal.   4. Normal right ventricular cavity size and normal right ventricular systolic function.   5. Moderate aortic regurgitation.   6. Estimated pulmonary artery systolic pressure is 23 mmHg, consistent with normal pulmonary artery pressure.   7. No prior echocardiogram is available for comparison.   8. No pericardial effusion seen.    CT Brain Stroke Protocol (09.23.24 @ 13:53)  IMPRESSION:  1.  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    CT Head No Cont, CT Cervical Spine (09.23.24 @ 21:12)  IMPRESSION:  CT HEAD:  Stable appearing hemorrhagic infarct within the right PCA territory,   compared with earlier examination also performed today at approximately   1:50 PM.    CT CERVICAL SPINE:  No acute fracture or traumatic subluxation.    Multi-level degenerative changes, as described level by level in detail   above.  If there is clinical concern for myelopathy or radiculopathy,   consider further evaluation via MR imaging of the cervical spine,   provided the patient has no contraindications.   [facility] Stroke Team  Progress Note     HPI:  Patient is a pleasant 85 y/o Male with a PMHx of Right occipital infarct on 8/24/24 now on Plavix for one week, DM, HTN, HLD, GERD, who presented to [facility] ED on 9/23/24 following a fall at nursing home around 3am that morning. He reported going to the bathroom, feeling wobbly and then falling.  He reported he occasionally experiences similar wobbling symptoms.  He endorsed a headstrike and has a linear abrasion across his forehead with a small hematoma.  He denies LOC.  The patient's daughter reported hearing about the fall from his nursing home that morning and then called the ambulance after consulting with the Pt's neurologist. He is AOx1 with Left visual deficits which the daughter reports is his baseline since his stroke in August.  He denies pain anywhere except mild soreness in his Right trapezius.     SUBJECTIVE: Patient seen and examined actively walking down hallway with nursing assistant, confused and agitated. No events overnight.  No new neurologic complaints.  ROS reported negative unless otherwise noted.    MEDICATIONS:  acetaminophen     Tablet .. 650 milliGRAM(s) Oral every 6 hours PRN  atorvastatin 80 milliGRAM(s) Oral at bedtime  chlorhexidine 2% Cloths 1 Application(s) Topical <User Schedule>  folic acid 1 milliGRAM(s) Oral daily  insulin lispro (ADMELOG) corrective regimen sliding scale   SubCutaneous Before meals and at bedtime  levothyroxine 75 MICROGram(s) Oral daily  metoprolol succinate ER 25 milliGRAM(s) Oral daily  OLANZapine Injectable 2.5 milliGRAM(s) IntraMuscular every 8 hours PRN  pantoprazole    Tablet 40 milliGRAM(s) Oral before breakfast  senna 2 Tablet(s) Oral at bedtime  tamsulosin 0.4 milliGRAM(s) Oral at bedtime      Vital Signs Last 24 Hrs  T(C): 37.1 (25 Sep 2024 11:37), Max: 37.1 (24 Sep 2024 20:23)  T(F): 98.7 (25 Sep 2024 11:37), Max: 98.8 (24 Sep 2024 20:23)  HR: 59 (25 Sep 2024 11:37) (53 - 81)  BP: 138/72 (25 Sep 2024 11:37) (123/84 - 152/89)  BP(mean): 92 (24 Sep 2024 19:00) (90 - 109)  RR: 18 (25 Sep 2024 11:37) (11 - 21)  SpO2: 98% (25 Sep 2024 11:37) (98% - 100%)    Parameters below as of 25 Sep 2024 11:37  Patient On (Oxygen Delivery Method): room air      PHYSICAL EXAM:  General: No acute distress    NEUROLOGICAL EXAM:  Mental status: The patient is awake and alert and has normal attention span. The patient is oriented to self only. The patient is able to name objects, follow commands, repeat sentences.    Cranial nerves: Pupils equal and react symmetrically to light. +Left visual field cut. Extraocular motion is full with no nystagmus. There is no ptosis. Facial sensation is intact. Facial musculature is symmetric. Palate elevates symmetrically. Tongue is midline.    Motor: There is normal bulk and tone.  There is no tremor.  Strength is 5/5 in the right arm and leg.   Strength is 5/5 in the left arm and leg. Slight pronator drift LUE.     Sensation: Intact to light touch in 4 extremities    Reflexes: 1-2+ throughout and plantar responses are flexor.    Cerebellar: There is no dysmetria on finger to nose testing.    Gait: Deferred        LABS:                        10.6   2.16  )-----------( 151      ( 25 Sep 2024 05:59 )             33.3    09-25    140  |  107  |  30.4[H]  ----------------------------<  94  3.8   |  20.0[L]  |  1.71[H]    Ca    9.0      25 Sep 2024 05:59  Phos  3.3     09-25  Mg     1.9     09-25    TPro  7.4  /  Alb  3.6  /  TBili  0.6  /  DBili  0.2  /  AST  15  /  ALT  13  /  AlkPhos  68  09-24  PT/INR - ( 24 Sep 2024 01:40 )   PT: 14.4 sec;   INR: 1.31 ratio         PTT - ( 24 Sep 2024 01:40 )  PTT:30.3 sec    LDL 45  HgbA1c 6.1    RADIOLOGY & ADDITIONAL STUDIES (independently reviewed unless otherwise noted):  MR Head No Cont (09.24.24 @ 20:06)  IMPRESSION:  Subacute infarct in the right posterior cerebral artery vascular   territory with hemorrhagic conversion appears grossly unchanged from CT   of 09/24/2024.    CT Head No Cont (09.24.24 @ 14:57)  IMPRESSION:  1.  No significant change, without interval rebleeding.    TTE W or WO Ultrasound Enhancing Agent (09.24.24 @ 09:29)  CONCLUSIONS:   1. Left ventricular systolic function is normal with an ejection fraction of 60 % by Ro's method of disks.   2. There is no evidence of a left ventricular thrombus.  3. Basal inferolateral segment is abnormal.   4. Normal right ventricular cavity size and normal right ventricular systolic function.   5. Moderate aortic regurgitation.   6. Estimated pulmonary artery systolic pressure is 23 mmHg, consistent with normal pulmonary artery pressure.   7. No prior echocardiogram is available for comparison.   8. No pericardial effusion seen.    CT Brain Stroke Protocol (09.23.24 @ 13:53)  IMPRESSION:  1.  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    CT Head No Cont, CT Cervical Spine (09.23.24 @ 21:12)  IMPRESSION:  CT HEAD:  Stable appearing hemorrhagic infarct within the right PCA territory,   compared with earlier examination also performed today at approximately   1:50 PM.    CT CERVICAL SPINE:  No acute fracture or traumatic subluxation.    Multi-level degenerative changes, as described level by level in detail   above.  If there is clinical concern for myelopathy or radiculopathy,   consider further evaluation via MR imaging of the cervical spine,   provided the patient has no contraindications.

## 2024-09-25 NOTE — CHART NOTE - NSCHARTNOTEFT_GEN_A_CORE
Spoke with patient's daughter at bedside, neurology NP on phone, with chart review  patient had failed asa outpatient and was on plavix  had come in from rehab and was about to be discharged when he fell and was brought to the hospital, found hemorrhagic conversion of prior stroke  daughter would thus not want the patient to go to rehab    anticoagulation outpatient:  pt has history of multiple myeloma  heme consult in AM to find out if this is enough cause for AC need  pt should f/u with CT head in 2 weeks to check for stable bleed -> if needed, restart AC/AP. will f/u with rec based on neuro /heme    afib work up outpatient MCOT vs ILD  requesting input from [employer] (PCP & cardiologist), waiting for call back    palliative consulted

## 2024-09-25 NOTE — PROGRESS NOTE ADULT - ASSESSMENT
84M w PMHx of DM, htn, hld, GERD, 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 Imaging found hemorrhagic conversion of R PCA.    # R PCA with Hemorrhagic conversion  - unknown etiology of last stroke, must chart review  - imaging found hemorrhagic conversion 1m after old stroke  - neurosurgery recs no AC/AP until cleared    # Pancytopenia  - since admission, WBC ~2, RBC ~4, plt 140-150  - BM suppression?    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 Imaging found hemorrhagic conversion of R PCA.    # R PCA with Hemorrhagic conversion  - unknown etiology of last stroke, must chart review  - imaging found hemorrhagic conversion 1m after old stroke  - neurosurgery recs no AC/AP until cleared    # Pancytopenia  - since admission, WBC ~2, RBC ~4, plt 140-150  - BM suppression?    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 Pt does not want him to go back to rehab    84M w PMHx of DM, htn, hld, GERD, 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 Admitted for hemorrhagic conversion of R PCA. Physical exam shows a 2 inch lateral cut on forehead.    # Delirium  - occasionally fluctuating in ANOx2-3  - olanzapine 2.5mg IM prn q8h for agitation  - pt walking around today, needed reorientation    # R PCA with Hemorrhagic conversion  - unknown etiology of last stroke, must chart review  - imaging found hemorrhagic conversion 1m after old stroke  - neurosurgery recs no AC/AP until cleared    # Pancytopenia  - since admission, WBC ~2, RBC ~4, plt 140-150  - BM suppression?    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 Pt does not want him to go back to rehab    84M w PMHx of DM, htn, hld, GERD, 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 Admitted for hemorrhagic conversion of R PCA. Physical exam shows a 2 inch lateral cut on forehead.    # ams / likely Delirium due to cva   - occasionally fluctuating in ANOx2-3  - olanzapine 2.5mg IM prn q8h for agitation  - pt walking around today, needed reorientation    # R PCA with Hemorrhagic conversion  - unknown etiology of last stroke, must chart review  - imaging found hemorrhagic conversion 1m after old stroke  - neurosurgery recs no AC/AP until cleared  - < from: MR Head No Cont (09.24.24 @ 20:06) >Subacute infarct in the right posterior cerebral artery vascular territory with hemorrhagic conversion appears grossly unchanged from CT of 09/24/2024.    # Pancytopenia  - since admission, WBC ~2, RBC ~4, plt 140-150  - BM suppression?    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  daughter does not want him to go back to rehab

## 2024-09-25 NOTE — CHART NOTE - NSCHARTNOTESELECT_GEN_ALL_CORE
DOWNGRADE NOTE/Transfer Note
Event Note
Agitation/Combativeness/Event Note
Event Note
TERTIARY SURVEY/Event Note

## 2024-09-25 NOTE — PROGRESS NOTE ADULT - SUBJECTIVE AND OBJECTIVE BOX
SUBJECTIVE:    Chief Complaint: Patient is a 84y old  Male who presents with a chief complaint of Right PCA infarct with hemorrhagic conversion (24 Sep 2024 16:31)    84M w PMHx of DM, htn, hld, GERD, 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 Imaging found hemorrhagic conversion of R PCA.    INTERVAL HPI/LAST 24HRS EVENTS: Patient seen and examined at bedside at AM. No clinically acute event overnight.   Denies any chest pain, palpitations, SOB, PND, orthopnea, leg edema, N/V/D, or any other complaints.     MEDICATIONS  (STANDING):  atorvastatin 80 milliGRAM(s) Oral at bedtime  chlorhexidine 2% Cloths 1 Application(s) Topical <User Schedule>  folic acid 1 milliGRAM(s) Oral daily  insulin lispro (ADMELOG) corrective regimen sliding scale   SubCutaneous Before meals and at bedtime  levothyroxine 75 MICROGram(s) Oral daily  metoprolol succinate ER 25 milliGRAM(s) Oral daily  pantoprazole    Tablet 40 milliGRAM(s) Oral before breakfast  senna 2 Tablet(s) Oral at bedtime  sodium chloride 0.9%. 1000 milliLiter(s) (75 mL/Hr) IV Continuous <Continuous>  tamsulosin 0.4 milliGRAM(s) Oral at bedtime    MEDICATIONS  (PRN):  acetaminophen     Tablet .. 650 milliGRAM(s) Oral every 6 hours PRN Mild Pain (1 - 3), Moderate Pain (4 - 6), Severe Pain (7 - 10)      Allergies    No Known Allergies    Intolerances        REVIEW OF SYSTEMS:  CONSTITUTIONAL: No fever, weight loss, or fatigue  RESPIRATORY: No cough, wheezing, chills or hemoptysis; No shortness of breath  CARDIOVASCULAR: No chest pain, palpitations, dizziness, or leg swelling  GASTROINTESTINAL: No abdominal or epigastric pain. No nausea, vomiting, or hematemesis; No diarrhea or constipation. No melena or hematochezia.  NEUROLOGICAL: No headaches, loss of strength, numbness, or tremors  MUSCULOSKELETAL: No joint pain or swelling; No muscle, back, or extremity pain    OBJECTIVE:    Vital Signs Last 24 Hrs  T(C): 36.3 (25 Sep 2024 04:39), Max: 37.1 (24 Sep 2024 20:23)  T(F): 97.3 (25 Sep 2024 04:39), Max: 98.8 (24 Sep 2024 20:23)  HR: 81 (25 Sep 2024 06:00) (42 - 81)  BP: 129/73 (25 Sep 2024 04:39) (123/84 - 158/82)  BP(mean): 92 (24 Sep 2024 19:00) (78 - 109)  RR: 18 (25 Sep 2024 04:39) (11 - 21)  SpO2: 99% (25 Sep 2024 04:39) (96% - 100%)    Parameters below as of 25 Sep 2024 00:00  Patient On (Oxygen Delivery Method): room air        PHYSICAL EXAM:  Constitutional: Alert, interactive, comfortable, NAD  Head and Face: Atraumatic, head and face were normal in appearance  Eyes: Sclera and conjunctiva were normal, pupils were equal in size, round, eyelids normal  ENT: Ears and nose were normal in appearance  Neck: Appearance was normal, neck was supple, No JVD  Pulmonary: Clear to auscultation bilaterally, no rales/crackles, or wheezing  Heart: Regular rate and rhythm, no murmurs, gallops, or pericardial rubs  Abdominal: Soft, nontender, nondistended. No appreciable hepatosplenomegaly. Bowel sounds normal  Skin: Normal color and intact without appreciable rash or abnormal skin lesion  Extremities: Warm without edema. No clubbing.  Pulse: 2+ radial pulse  Neuro: Oriented to person, place, and time    Lab/ Imaging:    LABS:                        10.6   2.16  )-----------( 151      ( 25 Sep 2024 05:59 )             33.3     09-24    139  |  105  |  36.4[H]  ----------------------------<  79  4.3   |  22.0  |  1.94[H]    Ca    9.1      24 Sep 2024 11:50  Phos  3.0     09-24  Mg     2.0     09-24    TPro  7.4  /  Alb  3.6  /  TBili  0.6  /  DBili  0.2  /  AST  15  /  ALT  13  /  AlkPhos  68  09-24    PT/INR - ( 24 Sep 2024 01:40 )   PT: 14.4 sec;   INR: 1.31 ratio         PTT - ( 24 Sep 2024 01:40 )  PTT:30.3 sec  Urinalysis Basic - ( 24 Sep 2024 11:50 )    Color: x / Appearance: x / SG: x / pH: x  Gluc: 79 mg/dL / Ketone: x  / Bili: x / Urobili: x   Blood: x / Protein: x / Nitrite: x   Leuk Esterase: x / RBC: x / WBC x   Sq Epi: x / Non Sq Epi: x / Bacteria: x      CAPILLARY BLOOD GLUCOSE      POCT Blood Glucose.: 101 mg/dL (24 Sep 2024 21:29)  POCT Blood Glucose.: 127 mg/dL (24 Sep 2024 15:49)  POCT Blood Glucose.: 79 mg/dL (24 Sep 2024 11:41)          Imaging Personally Reviewed:  [ ] YES  [ ] NO    Consultant(s) Notes Reviewed:  [ ] YES  [ ] NO    Care Discussed with Consultants/Other Providers [ ] YES  [ ] NO    Plan of Care discussed with Housestaff [ ]YES [ ] NO SUBJECTIVE:    Chief Complaint: Patient is a 84y old  Male who presents with a chief complaint of Right PCA infarct with hemorrhagic conversion (24 Sep 2024 16:31)    84M w PMHx of DM, htn, hld, GERD, 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 Imaging found hemorrhagic conversion of R PCA.    INTERVAL HPI/LAST 24HRS EVENTS: Patient seen and examined at bedside at AM. No clinically acute event overnight.   Denies any chest pain, palpitations, SOB, PND, orthopnea, leg edema, N/V/D, or any other complaints.     MEDICATIONS  (STANDING):  atorvastatin 80 milliGRAM(s) Oral at bedtime  chlorhexidine 2% Cloths 1 Application(s) Topical <User Schedule>  folic acid 1 milliGRAM(s) Oral daily  insulin lispro (ADMELOG) corrective regimen sliding scale   SubCutaneous Before meals and at bedtime  levothyroxine 75 MICROGram(s) Oral daily  metoprolol succinate ER 25 milliGRAM(s) Oral daily  pantoprazole    Tablet 40 milliGRAM(s) Oral before breakfast  senna 2 Tablet(s) Oral at bedtime  sodium chloride 0.9%. 1000 milliLiter(s) (75 mL/Hr) IV Continuous <Continuous>  tamsulosin 0.4 milliGRAM(s) Oral at bedtime    MEDICATIONS  (PRN):  acetaminophen     Tablet .. 650 milliGRAM(s) Oral every 6 hours PRN Mild Pain (1 - 3), Moderate Pain (4 - 6), Severe Pain (7 - 10)      Allergies    No Known Allergies    Intolerances        REVIEW OF SYSTEMS:  CONSTITUTIONAL: No fever, weight loss, or fatigue  RESPIRATORY: No cough, wheezing, chills or hemoptysis; No shortness of breath  CARDIOVASCULAR: No chest pain, palpitations, dizziness, or leg swelling  GASTROINTESTINAL: No abdominal or epigastric pain. No nausea, vomiting, or hematemesis; No diarrhea or constipation. No melena or hematochezia.  NEUROLOGICAL: No headaches, loss of strength, numbness, or tremors  MUSCULOSKELETAL: No joint pain or swelling; No muscle, back, or extremity pain    OBJECTIVE:    Vital Signs Last 24 Hrs  T(C): 36.3 (25 Sep 2024 04:39), Max: 37.1 (24 Sep 2024 20:23)  T(F): 97.3 (25 Sep 2024 04:39), Max: 98.8 (24 Sep 2024 20:23)  HR: 81 (25 Sep 2024 06:00) (42 - 81)  BP: 129/73 (25 Sep 2024 04:39) (123/84 - 158/82)  BP(mean): 92 (24 Sep 2024 19:00) (78 - 109)  RR: 18 (25 Sep 2024 04:39) (11 - 21)  SpO2: 99% (25 Sep 2024 04:39) (96% - 100%)    Parameters below as of 25 Sep 2024 00:00  Patient On (Oxygen Delivery Method): room air        PHYSICAL EXAM:  Constitutional: NAD, frail  Head and Face: Atraumatic, head and face were normal in appearance  Eyes: Sclera and conjunctiva were normal, pupils were equal in size, round, eyelids normal  ENT: Ears and nose were normal in appearance  Neck: Appearance was normal, neck was supple, No JVD  Pulmonary: Clear to auscultation bilaterally, no rales/crackles, or wheezing  Heart: Regular rate and rhythm, no murmurs, gallops, or pericardial rubs  Abdominal: Soft, nontender, nondistended. No appreciable hepatosplenomegaly. Bowel sounds normal  Skin: Normal color and intact without appreciable rash or abnormal skin lesion  Extremities: Warm without edema. No clubbing.  Pulse: 2+ radial pulse  Neuro: Oriented to person, place, and time    Lab/ Imaging:    LABS:                        10.6   2.16  )-----------( 151      ( 25 Sep 2024 05:59 )             33.3     09-24    139  |  105  |  36.4[H]  ----------------------------<  79  4.3   |  22.0  |  1.94[H]    Ca    9.1      24 Sep 2024 11:50  Phos  3.0     09-24  Mg     2.0     09-24    TPro  7.4  /  Alb  3.6  /  TBili  0.6  /  DBili  0.2  /  AST  15  /  ALT  13  /  AlkPhos  68  09-24    PT/INR - ( 24 Sep 2024 01:40 )   PT: 14.4 sec;   INR: 1.31 ratio         PTT - ( 24 Sep 2024 01:40 )  PTT:30.3 sec  Urinalysis Basic - ( 24 Sep 2024 11:50 )    Color: x / Appearance: x / SG: x / pH: x  Gluc: 79 mg/dL / Ketone: x  / Bili: x / Urobili: x   Blood: x / Protein: x / Nitrite: x   Leuk Esterase: x / RBC: x / WBC x   Sq Epi: x / Non Sq Epi: x / Bacteria: x      CAPILLARY BLOOD GLUCOSE      POCT Blood Glucose.: 101 mg/dL (24 Sep 2024 21:29)  POCT Blood Glucose.: 127 mg/dL (24 Sep 2024 15:49)  POCT Blood Glucose.: 79 mg/dL (24 Sep 2024 11:41)          Imaging Personally Reviewed:  [ ] YES  [ ] NO    Consultant(s) Notes Reviewed:  [ ] YES  [ ] NO    Care Discussed with Consultants/Other Providers [ ] YES  [ ] NO    Plan of Care discussed with Housestaff [ ]YES [ ] NO SUBJECTIVE:    Chief Complaint: Patient is a 84y old  Male who presents with a chief complaint of Right PCA infarct with hemorrhagic conversion (24 Sep 2024 16:31)    84M w PMHx of DM, htn, hld, GERD, 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 Imaging found hemorrhagic conversion of R PCA.    INTERVAL HPI/LAST 24HRS EVENTS: Patient seen and examined at bedside at AM. No clinically acute event overnight.   Denies any chest pain, palpitations, SOB, PND, orthopnea, leg edema, N/V/D, or any other complaints.     MEDICATIONS  (STANDING):  atorvastatin 80 milliGRAM(s) Oral at bedtime  chlorhexidine 2% Cloths 1 Application(s) Topical <User Schedule>  folic acid 1 milliGRAM(s) Oral daily  insulin lispro (ADMELOG) corrective regimen sliding scale   SubCutaneous Before meals and at bedtime  levothyroxine 75 MICROGram(s) Oral daily  metoprolol succinate ER 25 milliGRAM(s) Oral daily  pantoprazole    Tablet 40 milliGRAM(s) Oral before breakfast  senna 2 Tablet(s) Oral at bedtime  sodium chloride 0.9%. 1000 milliLiter(s) (75 mL/Hr) IV Continuous <Continuous>  tamsulosin 0.4 milliGRAM(s) Oral at bedtime    MEDICATIONS  (PRN):  acetaminophen     Tablet .. 650 milliGRAM(s) Oral every 6 hours PRN Mild Pain (1 - 3), Moderate Pain (4 - 6), Severe Pain (7 - 10)      Allergies    No Known Allergies    Intolerances        REVIEW OF SYSTEMS:  CONSTITUTIONAL: No fever, weight loss, or fatigue  RESPIRATORY: No cough, wheezing, chills or hemoptysis; No shortness of breath  CARDIOVASCULAR: No chest pain, palpitations, dizziness, or leg swelling  GASTROINTESTINAL: No abdominal or epigastric pain. No nausea, vomiting, or hematemesis; No diarrhea or constipation. No melena or hematochezia.  NEUROLOGICAL: No headaches, loss of strength, numbness, or tremors  MUSCULOSKELETAL: No joint pain or swelling; No muscle, back, or extremity pain    OBJECTIVE:    Vital Signs Last 24 Hrs  T(C): 36.3 (25 Sep 2024 04:39), Max: 37.1 (24 Sep 2024 20:23)  T(F): 97.3 (25 Sep 2024 04:39), Max: 98.8 (24 Sep 2024 20:23)  HR: 81 (25 Sep 2024 06:00) (42 - 81)  BP: 129/73 (25 Sep 2024 04:39) (123/84 - 158/82)  BP(mean): 92 (24 Sep 2024 19:00) (78 - 109)  RR: 18 (25 Sep 2024 04:39) (11 - 21)  SpO2: 99% (25 Sep 2024 04:39) (96% - 100%)    Parameters below as of 25 Sep 2024 00:00  Patient On (Oxygen Delivery Method): room air        PHYSICAL EXAM:  Constitutional: NAD, frail habitus but able to walk around with assistance  Head and Face: Atraumatic, head and face were normal in appearance. 2 inch lateral cut on forehead.  Eyes: Sclera and conjunctiva were normal, pupils were equal in size, round, eyelids normal  ENT: Ears and nose were normal in appearance  Neck: Appearance was normal, neck was supple, No JVD  Pulmonary: Clear to auscultation bilaterally, no rales/crackles, or wheezing  Heart: Regular rate and rhythm, no murmurs, gallops, or pericardial rubs  Abdominal: Soft, nontender, nondistended. No appreciable hepatosplenomegaly. Bowel sounds normal  Skin: Normal color and intact without appreciable rash or abnormal skin lesion  Extremities: Warm without edema. No clubbing.  Pulse: 2+ radial pulse  Neuro: ANOx2-3 (3 in early morning, 2 during wandering episode)      Lab/ Imaging:    LABS:                        10.6   2.16  )-----------( 151      ( 25 Sep 2024 05:59 )             33.3     09-24    139  |  105  |  36.4[H]  ----------------------------<  79  4.3   |  22.0  |  1.94[H]    Ca    9.1      24 Sep 2024 11:50  Phos  3.0     09-24  Mg     2.0     09-24    TPro  7.4  /  Alb  3.6  /  TBili  0.6  /  DBili  0.2  /  AST  15  /  ALT  13  /  AlkPhos  68  09-24    PT/INR - ( 24 Sep 2024 01:40 )   PT: 14.4 sec;   INR: 1.31 ratio         PTT - ( 24 Sep 2024 01:40 )  PTT:30.3 sec  Urinalysis Basic - ( 24 Sep 2024 11:50 )    Color: x / Appearance: x / SG: x / pH: x  Gluc: 79 mg/dL / Ketone: x  / Bili: x / Urobili: x   Blood: x / Protein: x / Nitrite: x   Leuk Esterase: x / RBC: x / WBC x   Sq Epi: x / Non Sq Epi: x / Bacteria: x      CAPILLARY BLOOD GLUCOSE      POCT Blood Glucose.: 101 mg/dL (24 Sep 2024 21:29)  POCT Blood Glucose.: 127 mg/dL (24 Sep 2024 15:49)  POCT Blood Glucose.: 79 mg/dL (24 Sep 2024 11:41)          Imaging Personally Reviewed:  [ ] YES  [ ] NO    Consultant(s) Notes Reviewed:  [ ] YES  [ ] NO    Care Discussed with Consultants/Other Providers [ ] YES  [ ] NO    Plan of Care discussed with Housestaff [ ]YES [ ] NO

## 2024-09-25 NOTE — CHART NOTE - NSCHARTNOTEFT_GEN_A_CORE
Provider called to bedside by RN around 1900 for agitation s/p IM zyprexa. Patient was attempting to hit nurses while trying to get out of bed. Not verbally reorientable. Requesting everyone to leave the room and persistently trying to get out of bed. Patient was attempting to stand by grabbing the curtains to stabilize himself. Patient was advised that he would fall as the curtains are not strong enough to sustain his weight. Instructed staff to give patient space and watch closely on monitor. I asked patient if he would like help sitting in a chair or walking and he declined. Patient was persistently agitated over the course of the next hour. I placed additional 2.5 1x dose IM zyprexa. On reeval patient was calm, not lethargic, verbally reorientable. Laying in bed. Provider called to bedside by RN around 1900 for persistent agitation despite use of PRN IM zyprexa. Patient was attempting to hit nurses while trying to get out of bed. Not verbally reorientable. Requesting everyone to leave the room and persistently trying to get out of bed. Patient was attempting to stand by grabbing the curtains to stabilize himself. Patient was advised that he would fall as the curtains are not strong enough to sustain his weight. Instructed staff to give patient space and watch closely on monitor. I asked patient if he would like help sitting in a chair or walking and he declined. Patient was persistently agitated over the course of the next hour. I placed additional 2.5 1x dose IM zyprexa. On reeval patient was calm, not lethargic, verbally reorientable. Laying in bed. Provider called to bedside by RN around 1900 for persistent agitation despite use of PRN IM zyprexa. Patient was attempting to hit nurses trying to assist him while trying to get out of bed. Not verbally reorientable. Requesting everyone to leave the room and persistently trying to get out of bed alone. Patient was attempting to stand by grabbing the curtains to stabilize himself. Patient was advised that he would fall as the curtains are not strong enough to sustain his weight. Instructed staff to give patient space and watch closely on monitor. I asked patient if he would like help sitting in a chair or walking and he declined. Patient was persistently agitated over the course of the next hour. I placed additional 1x dose 2.5 IM zyprexa. On reeval patient the patient is still agitated, not lethargic, responding better to verbal reorientation.

## 2024-09-25 NOTE — PROGRESS NOTE ADULT - ASSESSMENT
ASSESSMENT:   Patient is a pleasant 83 y/o Male with a PMHx of Right occipital infarct on 8/24/24 now on Plavix for one week, DM, HTN, HLD, GERD, who presented to [facility] ED on 9/23/24 following a fall at nursing home around 3am that morning. He reported going to the bathroom, feeling wobbly and then falling.  He reported he occasionally experiences similar wobbling symptoms.  He endorsed a headstrike and has a linear abrasion across his forehead with a small hematoma.  He denies LOC.  The patient's daughter reported hearing about the fall from his nursing home that morning and then called the ambulance after consulting with the Pt's neurologist. He is AOx1 with Left visual deficits which the daughter reports is his baseline since his stroke in August.  He denies pain anywhere except mild soreness in his Right trapezius.     Ischemic stroke from ~1 month ago with hemorrhagic transformation. Discharge paperwork from prior hospitalization obtained as daughter brought in, chart reviewed.  Per paperwork, patient was hospitalized at [facility], originally presented on 8/24/24 after tripping over [employer], found to have acute Right occipital infarct with NO hemorrhagic transformation. 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 Cardiology c/s due to PVCs in hospital.    Etiology of stroke not stated in previous paperwork however likely intracranial atherosclerosis vs ESUS vs cardioembolic. No notation regarding when/why patient started on standalone Plavix (per paperwork was started ~1 week ago). Reviewed MR, hemorrhagic transformation appears late subacute, likely atraumatic, indeterminant cause as patient not hypertensive on arrival, Plavix only started ~1 week ago, uncertain if any imaging performed prior to initiation.    Recommend discussing with daughter whether stroke w/u is something she and patient would like to pursue at this time in alignment with [facility].     NEURO:   -Neurologically patient at baseline per daughter   -Continue close monitoring for neurologic deterioration    -Stroke neuro checks q 4 hours   -SBP goal normotension, avoiding rapid fluctuations and hypertensive/hypotensive episodes   -ANTITHROMBOTIC THERAPY: Recommend holding antiplatelets for at LEAST 2 weeks, obtaining f/u CT Head prior to reinitiation, pending clinical course   -STATIN THERAPY: Atorvastatin 80mg PO daily, LDL 45, can decrease dosage to 40mg, titrate statin to LDL goal less than 70  -HgA1C 6.1  -MRI Brain w/o as noted  -Dysphagia screen: PASS  -Physical therapy/OT/Speech eval/treatment  -TTE as noted, EF 60%, no evidence of cardiac source of stroke at this time  -Cardiac monitoring w/ telemetry for now, further evaluation pending findings of noted workup, +/- ILR (depending on patient/daughter's wishes regarding workup)  -DVT ppx: Heparin s.c [] LMWH [] SCD[x]    -Na Goal: 135-145   -Monitor for si/sx of infection   -Stroke education     OTHER:  Condition and plan of care d/w patient, questions and concerns addressed.     DISPOSITION: Rehab or home depending on PT eval once stable and workup is complete      CORE MEASURES:        Admission NIHSS: 2     Tenecteplase : [] YES [x] NO      LDL/A1C: 45/6.1     Depression Screen- if depression hx and/or present      Statin Therapy: Atorvastatin 80mg      Dysphagia Screen: [x] PASS [] FAIL     Smoking [] YES [x] NO      Afib [] YES [x] NO     Stroke Education [] YES [] NO [x] PENDING   ASSESSMENT:   Patient is a pleasant 83 y/o Male with a PMHx of Right occipital infarct on 8/24/24 now on Plavix for one week, DM, HTN, HLD, GERD, who presented to [facility] ED on 9/23/24 following a fall at nursing home around 3am that morning. He reported going to the bathroom, feeling wobbly and then falling.  He reported he occasionally experiences similar wobbling symptoms.  He endorsed a headstrike and has a linear abrasion across his forehead with a small hematoma.  He denies LOC.  The patient's daughter reported hearing about the fall from his nursing home that morning and then called the ambulance after consulting with the Pt's neurologist. He is AOx1 with Left visual deficits which the daughter reports is his baseline since his stroke in August.  He denies pain anywhere except mild soreness in his Right trapezius.     Ischemic stroke from ~1 month ago with hemorrhagic transformation. Discharge paperwork from prior hospitalization obtained as daughter brought in, chart reviewed.  Per paperwork, patient was hospitalized at [facility], originally presented on 8/24/24 after tripping over [employer], found to have acute Right occipital infarct with NO hemorrhagic transformation. 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 Cardiology c/s due to PVCs in hospital.    IMPRESSION: Right occipital infarction with hemorrhagic transformation. Etiology of stroke not stated in previous paperwork however concern for symptomatic intracranial atherosclerosis.  Timeline and/or precipitating events around hemorrhagic transformation unclear at this time. No notation regarding when/why patient started on Plavix monotherapy (was started ?~1 week ago). Reviewed MR, hemorrhagic transformation appears late subacute. Recommend discussing with daughter whether stroke w/u is something she and patient would like to pursue at this time in alignment with [facility].     NEURO:   -Neurologically patient at baseline per daughter   -Continue close monitoring for neurologic deterioration    -Stroke neuro checks q 4 hours   -SBP goal normotension, avoiding rapid fluctuations in the setting of atherosclerotic disease  -ANTITHROMBOTIC THERAPY: Recommend holding antiplatelets for at LEAST 2 weeks, obtaining f/u CT Head prior to reinitiation, pending clinical course and ongoing GOC  -STATIN THERAPY: Atorvastatin 80mg PO daily, LDL 45, can decrease dosage to 40mg, titrate statin to LDL goal less than 70  -HgA1C 6.1  -MRI Brain w/o as noted  -Dysphagia screen: PASS  -Physical therapy/OT/Speech eval/treatment  -Heme/onc f/u in the setting of smoldering multiple myeloma to r/o underlying hypercoagulable state   -TTE as noted, EF 60%, no evidence of cardiac source of stroke at this time  -Cardiac monitoring w/ telemetry for now  -If in alignment with [facility], consider long-term cardiac event monitoring to assess for underlying afib   -DVT ppx: Heparin s.c [] LMWH [] SCD[x]    -Na Goal: 135-145   -Monitor for si/sx of infection   -Stroke education     OTHER:  Condition and plan of care d/w patient, questions and concerns addressed.     DISPOSITION: Rehab or home depending on PT eval once stable and workup is complete      CORE MEASURES:        Admission NIHSS: 2     Tenecteplase : [] YES [x] NO      LDL/A1C: 45/6.1     Depression Screen- if depression hx and/or present      Statin Therapy: Atorvastatin 80mg      Dysphagia Screen: [x] PASS [] FAIL     Smoking [] YES [x] NO      Afib [] YES [x] NO     Stroke Education [] YES [] NO [x] PENDING

## 2024-09-26 DIAGNOSIS — I63.9 CEREBRAL INFARCTION, UNSPECIFIED: ICD-10-CM

## 2024-09-26 LAB
APPEARANCE UR: ABNORMAL
BACTERIA # UR AUTO: ABNORMAL /HPF
BILIRUB UR-MCNC: NEGATIVE — SIGNIFICANT CHANGE UP
CAST: 4 /LPF — SIGNIFICANT CHANGE UP (ref 0–4)
COLOR SPEC: YELLOW — SIGNIFICANT CHANGE UP
DIFF PNL FLD: ABNORMAL
GLUCOSE BLDC GLUCOMTR-MCNC: 100 MG/DL — HIGH (ref 70–99)
GLUCOSE BLDC GLUCOMTR-MCNC: 108 MG/DL — HIGH (ref 70–99)
GLUCOSE BLDC GLUCOMTR-MCNC: 127 MG/DL — HIGH (ref 70–99)
GLUCOSE BLDC GLUCOMTR-MCNC: 129 MG/DL — HIGH (ref 70–99)
GLUCOSE UR QL: NEGATIVE MG/DL — SIGNIFICANT CHANGE UP
KETONES UR-MCNC: NEGATIVE MG/DL — SIGNIFICANT CHANGE UP
LEUKOCYTE ESTERASE UR-ACNC: ABNORMAL
NITRITE UR-MCNC: NEGATIVE — SIGNIFICANT CHANGE UP
PH UR: 8 — SIGNIFICANT CHANGE UP (ref 5–8)
PROT UR-MCNC: 100 MG/DL
RBC CASTS # UR COMP ASSIST: 3 /HPF — SIGNIFICANT CHANGE UP (ref 0–4)
SP GR SPEC: 1.01 — SIGNIFICANT CHANGE UP (ref 1–1.03)
SQUAMOUS # UR AUTO: 2 /HPF — SIGNIFICANT CHANGE UP (ref 0–5)
UROBILINOGEN FLD QL: 0.2 MG/DL — SIGNIFICANT CHANGE UP (ref 0.2–1)
WBC UR QL: 80 /HPF — HIGH (ref 0–5)

## 2024-09-26 PROCEDURE — 99233 SBSQ HOSP IP/OBS HIGH 50: CPT | Mod: FS

## 2024-09-26 PROCEDURE — 99222 1ST HOSP IP/OBS MODERATE 55: CPT

## 2024-09-26 PROCEDURE — 99233 SBSQ HOSP IP/OBS HIGH 50: CPT | Mod: GC

## 2024-09-26 PROCEDURE — 99221 1ST HOSP IP/OBS SF/LOW 40: CPT | Mod: FS,GC

## 2024-09-26 PROCEDURE — 99497 ADVNCD CARE PLAN 30 MIN: CPT | Mod: 25

## 2024-09-26 RX ORDER — CEFTRIAXONE SODIUM 1 G
1000 VIAL (EA) INJECTION EVERY 24 HOURS
Refills: 0 | Status: COMPLETED | OUTPATIENT
Start: 2024-09-26 | End: 2024-09-28

## 2024-09-26 RX ADMIN — Medication 75 MICROGRAM(S): at 05:41

## 2024-09-26 RX ADMIN — Medication 2.5 MILLIGRAM(S): at 20:05

## 2024-09-26 RX ADMIN — CHLORHEXIDINE GLUCONATE ORAL RINSE 1 APPLICATION(S): 1.2 SOLUTION DENTAL at 05:41

## 2024-09-26 RX ADMIN — Medication 2 TABLET(S): at 21:17

## 2024-09-26 RX ADMIN — PANTOPRAZOLE SODIUM 40 MILLIGRAM(S): 40 TABLET, DELAYED RELEASE ORAL at 05:41

## 2024-09-26 RX ADMIN — Medication 0.4 MILLIGRAM(S): at 21:17

## 2024-09-26 RX ADMIN — FOLIC ACID 1 MILLIGRAM(S): 1 TABLET ORAL at 17:21

## 2024-09-26 RX ADMIN — Medication 3 MILLIGRAM(S): at 00:31

## 2024-09-26 RX ADMIN — Medication 25 MILLIGRAM(S): at 05:41

## 2024-09-26 RX ADMIN — ATORVASTATIN CALCIUM 40 MILLIGRAM(S): 10 TABLET, FILM COATED ORAL at 21:17

## 2024-09-26 RX ADMIN — Medication 1000 MILLIGRAM(S): at 20:53

## 2024-09-26 NOTE — CONSULT NOTE ADULT - SUBJECTIVE AND OBJECTIVE BOX
Hematology Consult Note    HPI:   85 yo M with a PMHx of occipital infarct on 8/24/24 now on plavix for one week, DM, HTN, HLD, GERD, who presents following a fall.  He reports going to the bathroom, feeling wobbly and then falling.  He reports he occasionally experiences similar wobbling sxs.  He endorses headstrike and has a linear abrasion across his forehead with a small hematoma.  He denies LOC.  The patients daughter reports hearing about the fall from his nursing home this morning and then calling the ambulance after consulting with the Pt's neurologist.  He is AOx1 with L visual deficits which the daughter reports is his baseline since his stroke in August.  He denies pain anywhere except mild soreness in his R trapezius.  (23 Sep 2024 21:53)      Allergies    No Known Allergies    Intolerances        MEDICATIONS  (STANDING):  atorvastatin 40 milliGRAM(s) Oral at bedtime  chlorhexidine 2% Cloths 1 Application(s) Topical <User Schedule>  folic acid 1 milliGRAM(s) Oral daily  insulin lispro (ADMELOG) corrective regimen sliding scale   SubCutaneous Before meals and at bedtime  levothyroxine 75 MICROGram(s) Oral daily  metoprolol succinate ER 25 milliGRAM(s) Oral daily  pantoprazole    Tablet 40 milliGRAM(s) Oral before breakfast  senna 2 Tablet(s) Oral at bedtime  tamsulosin 0.4 milliGRAM(s) Oral at bedtime    MEDICATIONS  (PRN):  acetaminophen     Tablet .. 650 milliGRAM(s) Oral every 6 hours PRN Mild Pain (1 - 3), Moderate Pain (4 - 6), Severe Pain (7 - 10)  melatonin 3 milliGRAM(s) Oral at bedtime PRN Insomnia  OLANZapine Injectable 2.5 milliGRAM(s) IntraMuscular every 8 hours PRN agitation      PAST MEDICAL & SURGICAL HISTORY:  Hypothyroid      HLD (hyperlipidemia)      HTN (hypertension)      Gastroesophageal reflux disease, esophagitis presence not specified      Type 2 diabetes mellitus  on metformin      Cerebrovascular accident (CVA)      H/O arthroscopy of right knee          FAMILY HISTORY:      SOCIAL HISTORY: No EtOH, no tobacco    REVIEW OF SYSTEMS:    CONSTITUTIONAL: No weakness, fevers or chills  EYES/ENT: No visual changes;  No vertigo or throat pain   NECK: No pain or stiffness  RESPIRATORY: No cough, wheezing, hemoptysis; No shortness of breath  CARDIOVASCULAR: No chest pain or palpitations  GASTROINTESTINAL: No abdominal or epigastric pain. No nausea, vomiting, or hematemesis; No diarrhea or constipation. No melena or hematochezia.  GENITOURINARY: No dysuria, frequency or hematuria  NEUROLOGICAL: No numbness or weakness  SKIN: No itching, burning, rashes, or lesions   All other review of systems is negative unless indicated above.        T(F): 96.9 (09-26-24 @ 05:00), Max: 98.7 (09-25-24 @ 11:37)  HR: 93 (09-26-24 @ 05:00)  BP: 119/72 (09-26-24 @ 05:00)  RR: 18 (09-26-24 @ 05:00)  SpO2: 96% (09-26-24 @ 05:00)  Wt(kg): --    GENERAL: NAD, well-developed  HEAD:  Atraumatic, Normocephalic  EYES: EOMI, PERRLA, conjunctiva and sclera clear  NECK: Supple, No JVD  CHEST/LUNG: Clear to auscultation bilaterally; No wheeze  HEART: Regular rate and rhythm; No murmurs, rubs, or gallops  ABDOMEN: Soft, Nontender, Nondistended; Bowel sounds present  EXTREMITIES:  2+ Peripheral Pulses, No clubbing, cyanosis, or edema  NEUROLOGY: non-focal  SKIN: No rashes or lesions                          10.6   2.16  )-----------( 151      ( 25 Sep 2024 05:59 )             33.3       09-25    140  |  107  |  30.4[H]  ----------------------------<  94  3.8   |  20.0[L]  |  1.71[H]    Ca    9.0      25 Sep 2024 05:59  Phos  3.3     09-25  Mg     1.9     09-25    EXAM: [id number] - PETCT WB ONC FDG INIT  - ORDERED BY: RAJENDRA HUYNH      PROCEDURE DATE:  05/21/2024           INTERPRETATION:  CLINICAL INFORMATION: Multiple myeloma.    TREATMENT STRATEGY EVALUATION: Initial  FASTING BLOOD SUGAR: 94 mg/dl  RADIOPHARMACEUTICAL: 10.42 mCi F-18 FDG, I.V.  I.V. SITE: antecubital right  UPTAKE PERIOD: 56 min  SCANNER: [employer] MI Gen2  ORAL CONTRAST: Omnipaque 300  PHARMACOLOGIC INTERVENTION: None.    TECHNIQUE: Following intravenous injection of radiopharmaceutical and   above uptake period, PET/CT was obtained from vertex of skull to feet. CT   protocol was optimized for PET attenuation correction and anatomic   localization and was not designed to produce and cannot replace   state-of-the-art diagnostic CT images with specific imaging protocols for   different body parts and indications. Images were reconstructed and   reviewed in axial, coronal and sagittal views and three-dimensional MIP.    The standardized uptake values (SUV) are normalized to patient body   weight and indicate the highest activity concentration (SUVmax) in a   given site. All image numbers refer to axial image number.    COMPARISON:  None.    OTHER STUDIES USED FOR CORRELATION: CT head and CT of abdomen and pelvis   dated 12/24/2023 and CT head and CTA of chest dated 4/11/2020    FINDINGS:    HEAD/NECK: Physiologic FDG activity in visualized brain, head, and neck.    THORAX: Few small FDG-avid foci in the mediastinal and bilateral hilar   regions, likely corresponding tosmall, difficult to delineate lymph   nodes.    LUNGS: No abnormal FDG activity. Resolution of patchy groundglass   opacities with peripheral bilateral lungs. New cluster of tiny nodules in   peripheral right middle lobe (image 146).    PLEURA/PERICARDIUM: No abnormal FDG activity. No effusion.    HEPATOBILIARY/PANCREAS: Physiologic FDG activity.  For reference, normal   liver demonstrates SUV mean 2.4.    SPLEEN: Physiologic FDG activity. Normal in size.    ADRENAL GLANDS: No abnormal FDG activity. No nodule.    KIDNEYS/URINARY BLADDER: Physiologic excreted FDG activity. Left renal   cysts.    REPRODUCTIVE ORGANS: Prostate gland measures 5.2 cm in maximum transverse   diameter. There is a focus of increased FDG activity in the left   mid/apical region of the prostate gland (SUV 7.3; image 302). No abnormal   radiotracer activity in the seminal vesicles.    ABDOMINOPELVIC LYMPH NODES/RETROPERITONEUM: No enlarged or FDG-avid lymph   node.    ESOPHAGUS/STOMACH/BOWEL/PERITONEUM/MESENTERY: Noabnormal FDG activity.    VESSELS: Large vessel atherosclerotic calcifications. No aneurysm    BONES/SOFT TISSUES: FDG-avid focus in right inferolateral aspect of the   body of the sternum, without corresponding abnormality on CT (SUV 4.7;   image 161). Physiologic FDG activity in the remainder of the osseous   structures. No lytic lesion is identified.    FDG-avid focus in right antecubital region is related to the   radiopharmaceutical injection.    IMPRESSION: Abnormal skull-to-thigh FDG-PET/CT scan.    1. Few small FDG-avid mediastinal and bilateral hilar lymph nodes are   nonspecific. A benign etiology is favored.    2. Resolution of patchy groundglass opacities seen within both lungs on   CT chest dated 4/11/2020.  Cluster of tiny nodules and peripheral right middle lobe may represent   mucoid impacted distal airways. A 1 month follow-up with dedicated CT of   chest is recommended for further evaluation.    3. Enlarged prostate gland with nonspecific focus of increased FDG   activity in left/mid apical region. Urology consultation is recommended   to exclude neoplasm.    4. Indeterminate FDG-avid focus in right inferolateral aspect of the body   of sternum, without corresponding abnormality on CT. No lytic lesion.    --- End of Report ---              < from: MR Head No Cont (09.24.24 @ 20:06) >  ACC: [id number] EXAM:  MR BRAIN   ORDERED BY: BRIT MUNSON     PROCEDURE DATE:  09/24/2024          INTERPRETATION:  CLINICAL INFORMATION: Hemorrhagic cerebrovascular   accident.    COMPARISON: CT head from 09/24/2024.    CONTRAST:  IV Contrast: NONE  Complications: None reported at time of study completion    TECHNIQUE: MRI brain was performed using the following sequences: Axial   DWI, sagittal T1 FLAIR, axial T2 FLAIR, axial T2 propeller, axial T1   FLAIR, axial acetabular    FINDINGS:    VENTRICLES AND SULCI: Mild to moderate age related involutional changes.  INTRA-AXIAL: There is a subacute infarct in the right posterior cerebral   artery vascular territory, involving the medial right occipital lobe and   posterior medial right temporal lobe, which demonstrates hemorrhagic   conversion.  There are scattered T1 hyperintense subacute blood products   throughout the infarct, greatest in the medial right occipital lobe.    There is an approximately 2.9 x 2 cm focus of diffusion restriction in   the medial right occipital lobe which appears to correspond to   parenchymal hematoma.  The remainder of the infarct does not demonstrate   diffusion signal abnormality, indicating it is greater than 7-10 days   old.  There is mild regional mass effect in the right occipital and   temporal lobes, without midline shift.  Mild T2 FLAIR signal   hyperintensity periventricular white matter is consistent with mild   microvascular type changes.  There are chronic lacunar infarcts in the   right corona radiata, within or adjacent to the lentiform nuclei   bilaterally, and in the right thalamus.  EXTRA-AXIAL: No blood products or fluid collection.    SINUSES:  Scattered mild mucosal thickening.  MASTOIDS:  Clear.  ORBITS: Status post left lens replacement.  CALVARIUM: Intact.    MISCELLANEOUS: None.      IMPRESSION:  Subacute infarct in the right posterior cerebral artery vascular   territory with hemorrhagic conversion appears grossly unchanged from CT   of 09/24/2024.        --- End of Report ---        < end of copied text >     Hematology Consult Note    HPI:   83 yo M with a PMHx of occipital infarct on 8/24/24 now on plavix for one week, DM, HTN, HLD, GERD, who presents following a fall.  He reports going to the bathroom, feeling wobbly and then falling.  He reports he occasionally experiences similar wobbling sxs.  He endorses headstrike and has a linear abrasion across his forehead with a small hematoma.  He denies LOC.  The patients daughter reports hearing about the fall from his nursing home this morning and then calling the ambulance after consulting with the Pt's neurologist.  He is AOx1 with L visual deficits which the daughter reports is his baseline since his stroke in August.  He denies pain anywhere except mild soreness in his R trapezius.  (23 Sep 2024 21:53)      Allergies    No Known Allergies    Intolerances        MEDICATIONS  (STANDING):  atorvastatin 40 milliGRAM(s) Oral at bedtime  chlorhexidine 2% Cloths 1 Application(s) Topical <User Schedule>  folic acid 1 milliGRAM(s) Oral daily  insulin lispro (ADMELOG) corrective regimen sliding scale   SubCutaneous Before meals and at bedtime  levothyroxine 75 MICROGram(s) Oral daily  metoprolol succinate ER 25 milliGRAM(s) Oral daily  pantoprazole    Tablet 40 milliGRAM(s) Oral before breakfast  senna 2 Tablet(s) Oral at bedtime  tamsulosin 0.4 milliGRAM(s) Oral at bedtime    MEDICATIONS  (PRN):  acetaminophen     Tablet .. 650 milliGRAM(s) Oral every 6 hours PRN Mild Pain (1 - 3), Moderate Pain (4 - 6), Severe Pain (7 - 10)  melatonin 3 milliGRAM(s) Oral at bedtime PRN Insomnia  OLANZapine Injectable 2.5 milliGRAM(s) IntraMuscular every 8 hours PRN agitation      PAST MEDICAL & SURGICAL HISTORY:  Hypothyroid      HLD (hyperlipidemia)      HTN (hypertension)      Gastroesophageal reflux disease, esophagitis presence not specified      Type 2 diabetes mellitus  on metformin      Cerebrovascular accident (CVA)      H/O arthroscopy of right knee          FAMILY HISTORY:      SOCIAL HISTORY: No EtOH, no tobacco    REVIEW OF SYSTEMS:    EVAN r/t patients MS        T(F): 96.9 (09-26-24 @ 05:00), Max: 98.7 (09-25-24 @ 11:37)  HR: 93 (09-26-24 @ 05:00)  BP: 119/72 (09-26-24 @ 05:00)  RR: 18 (09-26-24 @ 05:00)  SpO2: 96% (09-26-24 @ 05:00)  Wt(kg): --    GENERAL: obtundent   HEAD:  Atraumatic, Normocephalic  EYES: EOMI, PERRLA, conjunctiva and sclera clear  NECK: Supple, No JVD  CHEST/LUNG: Clear to auscultation bilaterally; No wheeze  HEART: Regular rate and rhythm; No murmurs, rubs, or gallops  ABDOMEN: Soft, Nontender, Nondistended; Bowel sounds present  EXTREMITIES:  2+ Peripheral Pulses, No clubbing, cyanosis, or edema  NEUROLOGY: A/Ox1  SKIN: No rashes or lesions                          10.6   2.16  )-----------( 151      ( 25 Sep 2024 05:59 )             33.3       09-25    140  |  107  |  30.4[H]  ----------------------------<  94  3.8   |  20.0[L]  |  1.71[H]    Ca    9.0      25 Sep 2024 05:59  Phos  3.3     09-25  Mg     1.9     09-25    EXAM: [id number] - PETCT WB ONC FDG INIT  - ORDERED BY: RAJENDRA HUYNH      PROCEDURE DATE:  05/21/2024           INTERPRETATION:  CLINICAL INFORMATION: Multiple myeloma.    TREATMENT STRATEGY EVALUATION: Initial  FASTING BLOOD SUGAR: 94 mg/dl  RADIOPHARMACEUTICAL: 10.42 mCi F-18 FDG, I.V.  I.V. SITE: antecubital right  UPTAKE PERIOD: 56 min  SCANNER: [employer] Gen2  ORAL CONTRAST: Omnipaque 300  PHARMACOLOGIC INTERVENTION: None.    TECHNIQUE: Following intravenous injection of radiopharmaceutical and   above uptake period, PET/CT was obtained from vertex of skull to feet. CT   protocol was optimized for PET attenuation correction and anatomic   localization and was not designed to produce and cannot replace   state-of-the-art diagnostic CT images with specific imaging protocols for   different body parts and indications. Images were reconstructed and   reviewed in axial, coronal and sagittal views and three-dimensional MIP.    The standardized uptake values (SUV) are normalized to patient body   weight and indicate the highest activity concentration (SUVmax) in a   given site. All image numbers refer to axial image number.    COMPARISON:  None.    OTHER STUDIES USED FOR CORRELATION: CT head and CT of abdomen and pelvis   dated 12/24/2023 and CT head and CTA of chest dated 4/11/2020    FINDINGS:    HEAD/NECK: Physiologic FDG activity in visualized brain, head, and neck.    THORAX: Few small FDG-avid foci in the mediastinal and bilateral hilar   regions, likely corresponding tosmall, difficult to delineate lymph   nodes.    LUNGS: No abnormal FDG activity. Resolution of patchy groundglass   opacities with peripheral bilateral lungs. New cluster of tiny nodules in   peripheral right middle lobe (image 146).    PLEURA/PERICARDIUM: No abnormal FDG activity. No effusion.    HEPATOBILIARY/PANCREAS: Physiologic FDG activity.  For reference, normal   liver demonstrates SUV mean 2.4.    SPLEEN: Physiologic FDG activity. Normal in size.    ADRENAL GLANDS: No abnormal FDG activity. No nodule.    KIDNEYS/URINARY BLADDER: Physiologic excreted FDG activity. Left renal   cysts.    REPRODUCTIVE ORGANS: Prostate gland measures 5.2 cm in maximum transverse   diameter. There is a focus of increased FDG activity in the left   mid/apical region of the prostate gland (SUV 7.3; image 302). No abnormal   radiotracer activity in the seminal vesicles.    ABDOMINOPELVIC LYMPH NODES/RETROPERITONEUM: No enlarged or FDG-avid lymph   node.    ESOPHAGUS/STOMACH/BOWEL/PERITONEUM/MESENTERY: Noabnormal FDG activity.    VESSELS: Large vessel atherosclerotic calcifications. No aneurysm    BONES/SOFT TISSUES: FDG-avid focus in right inferolateral aspect of the   body of the sternum, without corresponding abnormality on CT (SUV 4.7;   image 161). Physiologic FDG activity in the remainder of the osseous   structures. No lytic lesion is identified.    FDG-avid focus in right antecubital region is related to the   radiopharmaceutical injection.    IMPRESSION: Abnormal skull-to-thigh FDG-PET/CT scan.    1. Few small FDG-avid mediastinal and bilateral hilar lymph nodes are   nonspecific. A benign etiology is favored.    2. Resolution of patchy groundglass opacities seen within both lungs on   CT chest dated 4/11/2020.  Cluster of tiny nodules and peripheral right middle lobe may represent   mucoid impacted distal airways. A 1 month follow-up with dedicated CT of   chest is recommended for further evaluation.    3. Enlarged prostate gland with nonspecific focus of increased FDG   activity in left/mid apical region. Urology consultation is recommended   to exclude neoplasm.    4. Indeterminate FDG-avid focus in right inferolateral aspect of the body   of sternum, without corresponding abnormality on CT. No lytic lesion.    --- End of Report ---              < from: MR Head No Cont (09.24.24 @ 20:06) >  ACC: [id number] EXAM:  MR BRAIN   ORDERED BY: BRIT MUNSON     PROCEDURE DATE:  09/24/2024          INTERPRETATION:  CLINICAL INFORMATION: Hemorrhagic cerebrovascular   accident.    COMPARISON: CT head from 09/24/2024.    CONTRAST:  IV Contrast: NONE  Complications: None reported at time of study completion    TECHNIQUE: MRI brain was performed using the following sequences: Axial   DWI, sagittal T1 FLAIR, axial T2 FLAIR, axial T2 propeller, axial T1   FLAIR, axial acetabular    FINDINGS:    VENTRICLES AND SULCI: Mild to moderate age related involutional changes.  INTRA-AXIAL: There is a subacute infarct in the right posterior cerebral   artery vascular territory, involving the medial right occipital lobe and   posterior medial right temporal lobe, which demonstrates hemorrhagic   conversion.  There are scattered T1 hyperintense subacute blood products   throughout the infarct, greatest in the medial right occipital lobe.    There is an approximately 2.9 x 2 cm focus of diffusion restriction in   the medial right occipital lobe which appears to correspond to   parenchymal hematoma.  The remainder of the infarct does not demonstrate   diffusion signal abnormality, indicating it is greater than 7-10 days   old.  There is mild regional mass effect in the right occipital and   temporal lobes, without midline shift.  Mild T2 FLAIR signal   hyperintensity periventricular white matter is consistent with mild   microvascular type changes.  There are chronic lacunar infarcts in the   right corona radiata, within or adjacent to the lentiform nuclei   bilaterally, and in the right thalamus.  EXTRA-AXIAL: No blood products or fluid collection.    SINUSES:  Scattered mild mucosal thickening.  MASTOIDS:  Clear.  ORBITS: Status post left lens replacement.  CALVARIUM: Intact.    MISCELLANEOUS: None.      IMPRESSION:  Subacute infarct in the right posterior cerebral artery vascular   territory with hemorrhagic conversion appears grossly unchanged from CT   of 09/24/2024.        --- End of Report ---        < end of copied text >

## 2024-09-26 NOTE — CONSULT NOTE ADULT - ASSESSMENT
83 yo male with a history of HTN, HLD, DM, GERD, multiple myeloma, occipital infarct on 8/24/24, on plavix x 1 week, presents as a transfer from [facility] s/p mechanical fall in the bathroom, which resulted in hemorrhagic conversion of previous R PCA Infarct.  Palliative care consulted for goals fo care.   83 yo male with a history of HTN, HLD, DM, GERD, multiple myeloma, bladder cancer, occipital infarct on 8/24/24, on plavix x 1 week, presents as a transfer from [facility] s/p mechanical fall in the bathroom, which resulted in hemorrhagic conversion of previous R PCA Infarct.  Palliative care consulted for goals fo care.    #    85 yo male with a history of HTN, HLD, DM, GERD, multiple myeloma, urothelial cancer, occipital infarct on 8/24/24, on plavix x 1 week, presents as a transfer from [facility] s/p mechanical fall in the bathroom, which resulted in hemorrhagic conversion of previous R PCA Infarct.  Palliative care consulted for goals fo care.    # subacute CVA with hemorrhagic conversion  # occipital infarction  # MM  # urothelial cancer  # CRISTO  # delirium  # debility  # palliative care encounter    - low symptom burden at present  - will plan on outreach to patient's daughter Dionne. 85 yo male with a history of HTN, HLD, DM, GERD, multiple myeloma, bladder cancer, occipital infarct on 8/24/24, on plavix x 1 week, presents as a transfer from [facility] s/p mechanical fall in the bathroom, which resulted in hemorrhagic conversion of previous R PCA Infarct.  Palliative care consulted for goals fo care.    # subacute CVA with hemorrhagic conversion  # occipital infarction  # MM  # bladder cancer  # CRISTO  # delirium  # debility  # palliative care encounter    - low symptom burden at present  - medical management per primary team, neuro, heme-onc consults noted  - per neuro, hold antiplatelet therapy as noted above, get f/u CT prior to resumption of antiplatelet therapy  - heme-onc  rec f/u w/outpatient oncologist, notes plan for outpatient cystoscopy with urologist  - avoid nephrotoxins, renally dose medications  - at high risk for delirium in the setting of CVA, care in multiple facilities.  Exercise delirium precautions: provide re-assurance, re-orientation as needed.  Provide day/night distinction, compartmentalize care.  Assure regular bowel/bladder habits.  Be mindful of use of medications on Beers criteria.  - olanzapine ordered prn: no use since yesterday.  - supportive care, assist as needed    - will plan on outreach to patient's daughter Dionne.    At approximately 2:30 pm, met with Dionne at bedside.  See [facility] note.    Time Statement    Total Time Spent__60__ minutes    This includes chart review, patient assessment, discussion and collaboration with interdisciplinary team members, excluding ACP.    COUNSELING:    Face to face meeting to discuss Advanced Care Planning - Time Spent ____25__Minutes.    As palliative care is not managing symptoms and goals of care have been identified, will sign off. Thank you for involving us in this patient's care.  Please reconsult as needed.

## 2024-09-26 NOTE — PROGRESS NOTE ADULT - ATTENDING COMMENTS
ams likely multifactorial / hospital stay  / cva / recent fall with hemorrhagic conversion   check ua / bladder scan to r/o uti / retention   patient's primary cardio dr. Ariza wants patient to get ILR, will discuss with ep / cardio if possible prior to dc   to hold ac/ antiplts for 2 wks as per neuro , patient to follow up as op with neuro for repeat imaging prior to starting any anti plts or ac   frequent re orientation   fall precautions   plan for dc home with hc / home pt once medically stable , likely in 1-2 days   plan of care discussed at length with patient and daughter at bedside ams likely multifactorial / hospital stay  / cva / recent fall with hemorrhagic conversion   check ua / bladder scan to r/o uti / retention   patient's primary cardio dr. Ariza wants patient to get ILR, will discuss with ep / cardio if possible prior to dc   to hold ac/ antiplts for 2 wks as per neuro , patient to follow up as op with neurosx / neurology  for repeat imaging prior to starting any anti plts or ac   frequent re orientation   fall precautions   plan for dc home with hc / home pt once medically stable , likely in 1-2 days   plan of care discussed at length with patient and daughter at bedside

## 2024-09-26 NOTE — DIETITIAN INITIAL EVALUATION ADULT - OTHER INFO
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 Imaging found hemorrhagic conversion of R PCA.

## 2024-09-26 NOTE — CONSULT NOTE ADULT - CONSULT REQUESTED DATE/TIME
26-Sep-2024 08:54
24-Sep-2024 15:42
24-Sep-2024 11:38
26-Sep-2024 15:08
26-Sep-2024
23-Sep-2024 20:03

## 2024-09-26 NOTE — CONSULT NOTE ADULT - ASSESSMENT
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 Patient is very lethargic at this time, but his daughter Dionne is at the bedside to provide history. Patient had been in the rehab after his CVA at the end of August, but awoke around 3 AM, felt wobbly, and fell forward. Patient noted headstrike, and has a small abrasion and hematoma on his forehead. Patient denied any LOC. Per Dionne, patient didn't have a COURTNEY while at [facility], but followed up with his Cardiologist who placed an MCOT monitor for stroke workup. However, the MCOT still hasn't been returned to his Cardiologist, but also the rehab facility did not charge the battery either. Patient was noted to be agitated today, so received zyprexa. Patient unable to provide ROS at this time due to lethargy.  Troponin negative x 1  pBNP 292

## 2024-09-26 NOTE — PHYSICAL THERAPY INITIAL EVALUATION ADULT - CRITERIA FOR SKILLED THERAPEUTIC INTERVENTIONS
Occupational Therapy Get up and Go Note            Date: 2024  Patient Name: Rayna Hdez        MRN: [id number]    Account #: [id number]  : 1952  (71 y.o.)      Subjective:  Patient states:  \"I have to go to the bathroom\"  Pain:  Pain at start of treatment: Yes: 7/10    Pain at end of treatment: Yes: 7/10    Location: knee  Nursing notified: Yes  Ice was provided      Objective:  ADL:  Toileting:  independent  Toilet transfers:  modified independent        Treatment consisted of:   [] ADL Training  [] Strengthening   [x] Transfer Training    [] DME Education  [] HEP   [] Patient Education  [] Other:    Safety:  Safety Devices  Safety Devices in place:   Type of devices: All fall risk precautions in place      Therapy Time:   Individual Group Co-Treat   Time In 0740       Time Out 0750         Minutes 10             ADL/IADL training: 10 minutes         Electronically signed by:    MYRA Lucas    2024, 8:02 AM                                                                                 Occupational Therapy Get up and Go Note            Date: 2024  Patient Name: Rayna Hdez        MRN: [id number]    Account #: [id number]  : 1952  (71 y.o.)      Subjective:  Patient states:  Pt states she is uncomfortable with all staying in bed too long, wants to sit up but w/c is uncomfortable, asked for recliner but one could not be accommodated, and discussed sitting in regular chair if foot rest could be provided, and pt agreed.  Pain:  Pain at start of treatment: Yes: 4/10    Pain at end of treatment: Yes: 8/10    Location: R knee  Nursing notified: Yes  Sandra pt RN notified    Objective:  Pt educated on sitting in w/c or being in bed for safety per hospital policy.  Pt would like to sit up but others seated surfaces are too uncomfortable. Pt asked to come up into bed before therapist departure to alleviate leg hanging/swelling/pain.  Pt agreed. Pt completed EOB to supine: SBA, scooted to HOB with several attempts with Supervision.  Provided pillow under RLE for elevation.      Treatment consisted of:   [x] ADL Training  [] Strengthening   [] Transfer Training    [] DME Education  [] HEP   [] Patient Education  [] Other:    Safety:  Safety Devices  Safety Devices in place:   Type of devices: All fall risk precautions in place      Therapy Time:   Individual Group Co-Treat   Time In 0750       Time Out 0758         Minutes 8             Therapeutic activities: 8 minutes         Electronically signed by:    PATT Lewis    2024, 8:31 AM     DATE OF CONSULTATION  6/6/2024    CONSULTANT  Umang Aburto MD    REQUESTING PHYSICIAN  Rosaline Peterson DO     PRIMARY CARDIOLOGIST  Dr. Barba    REASON FOR CONSULTATION  No chief complaint on file.      Hospital Day: 8       Patient is a 71 y.o. female who presents with a chief complaint of severe rheumatoid arthritis s/p right total knee arthroplasty with Dr. Williamson on 5/28/2024.  Patient then admitted to [facility] rehab on 5/30.   Patient with history of normal coronaries per [facility] in 2018, HTN, HLD, SVT, CKD.    Cardiology consulted for BP management.    Patient blood pressures have been fluctuating systolic in 150s to 180s. She reports that she has had multiple issues with controlling blood pressure, in which her Cardizem was recently increased to 60mg three times daily.   Patient still in much pain post procedure, receiving pain medications.  Normal LVEF with no major valv. Abn on echo in 2021.  On examination today, patient working with PTOT.  She denies sob, chest pain.  BLE swelling noted   Overall feels well, pain controlled   ===============  Hospital course  6/6/2024  Patient lying in bed looks comfortable  Denies chest pain or shortness of breath  Patient reports that for the last 3 days at times he experiences episodes of palpitations.  She feels like she skips a beat  EKG from yesterday showing random PVCs  From cardiology standpoint patient is stable to be discharged at any time  Follow-up with [facility] in 1-2      6/4/2024  Patient laying in bed looks comfortable  Denies chest pain or shortness of breath  Blood pressure better controlled systolics 129/55  Lower extremity edema improving    6/5/2024  Patient resting in bed, appears to be comfortable  Denying shortness of breath or anginal type symptoms  Total urine output recorded in EMR in 24 hours is 800 ml  BP was a little bit soft this AM, in which has improved.   Current BP is 148/52  BLE continues to improve, right more swollen than left  Did report    Subjective:  The patient complains of severe acute on chronic progressive fatigue and right knee pain and swelling partially relieved by rest, medications, PT,  OT,     and rest and exacerbated by recent   surgery-right total knee replacement.  79-year-old female with severe crippling osteo and rheumatoid arthritis requiring a right total knee replacement under the care of Dr. Williamson on 5/28/2024 at [facility] in [facility]. She had failed conservative measures including multiple years of exercise, PT, injections, medication management. Due to cardiac history and complex arthritis she has had a more complicated postop medical course requiring acute rehab. Her blood pressure has been difficult to control requiring modification her medication regimen. She also has had a flare of her rheumatoid and osteoarthritis.     I am concerned about patient’s medical complexities and barriers to advancing in rehab goals including controlling pain and improving function.        I reviewed current care and plans for further care with other rehab providers including nursing and case management.  According to recent nursing note, \"  C/o 6/10 pain to right knee. Scheduled OxyContin given along with other HS scheduled medications. Ice applied. Patient SBA with FWW to restroom, returned to bed. Bed locked and low with alarm engaged. Call light within reach. \".    She complains of some swelling in the lower extremities related with Lasix.  She complains of some nausea secondary to her antibiotic for UTI redosed with food and she has PPI and Tums ordered.       ROS x10:  The patient also complains of severely impaired mobility and activities of daily living.  Otherwise no new problems with vision, hearing, nose, mouth, throat, dermal, cardiovascular, GI, , pulmonary, musculoskeletal, psychiatric or neurological. See also Acute Rehab PM&R H&P.       Vital signs:  BP (!) 157/61   Pulse 67   Temp 97.5 °F (36.4 °C) (Oral)   Resp    Subjective:  The patient complains of severe acute on chronic progressive fatigue and right knee pain and swelling partially relieved by rest, medications, PT,  OT,     and rest and exacerbated by recent   surgery-right total knee replacement.  79-year-old female with severe crippling osteo and rheumatoid arthritis requiring a right total knee replacement under the care of Dr. Williamson on 5/28/2024 at [facility] in [facility]. She had failed conservative measures including multiple years of exercise, PT, injections, medication management. Due to cardiac history and complex arthritis she has had a more complicated postop medical course requiring acute rehab. Her blood pressure has been difficult to control requiring modification her medication regimen. She also has had a flare of her rheumatoid and osteoarthritis.     I am concerned about patient’s medical complexities and barriers to advancing in rehab goals including controlling pain and improving function.        I reviewed current care and plans for further care with other rehab providers including nursing and case management.  According to recent nursing note, \" bladder scanned per Shana PCA after patient had difficulty urinating on toilet with little output. Patients bladder scan showed 624 ml. Patient straight cathed via sterile technique after one attempt per orders. Patient tolerated well and 400 ML clear yellow urine returned. Patient cleaned with peg-wipes afterwards and verbalized no further needs. Bed alarm engaged and call light within reach  \".    Nurse and I worked with patient to coordinate transitioning her to OxyContin scheduled at 6 8 2 PM and 10 PM with as needed dose in the middle when she needs it.  Fortunately she is not significantly get constipated and actually had 2 bowel movements yesterday.    ROS x10:  The patient also complains of severely impaired mobility and activities of daily living.  Otherwise no new problems with vision,  Comprehensive Nutrition Assessment    Type and Reason for Visit:  Initial, Consult    Nutrition Recommendations/Plan:   Modify Current Diet-low K+ KAMARI (per pt request)     Malnutrition Assessment:  Malnutrition Status:  No malnutrition (05/30/24 1415)      Nutrition Assessment:    Pt reports fair to good appetite currently and pta, with no c/o of weight loss. Pt requests low potassium and low sodium diet d/t history of renal failure/ARI. To modify diet to low K+/KAMARI per pt request. To continue to follow.    Nutrition Related Findings:    PMH-GERD, fibromyalgia, htn, hld, CKD3, ARI; admitted s/p Right total knee arthroplasty on 5/28. Labs/meds reviewed. (5/29)Na-134. Pertinent meds-colace, lipitor, ppi, glycolax, senekot. Last BM noted 5/29. +1-2 BLE edema noted. Wound Type: Surgical Incision       Current Nutrition Intake & Therapies:    Average Meal Intake: 51-75%     ADULT DIET; Regular    Anthropometric Measures:  Height: 170.2 cm (5' 7\")  Ideal Body Weight (IBW): 135 lbs (61 kg)    Admission Body Weight: 100.5 kg (221 lb 9 oz) (*edema present)  Current BMI (kg/m2):  34.7  Usual Body Weight: 97.1 kg (214 lb) (1/2024 stated)                       BMI Categories: Obese Class 1 (BMI 30.0-34.9)      Nutrition Diagnosis:   Altered nutrition-related lab values related to renal dysfunction as evidenced by lab values    Nutrition Interventions:   Food and/or Nutrient Delivery: Modify Current Diet (per pt request)  Nutrition Education/Counseling: No recommendation at this time  Coordination of Nutrition Care: Continue to monitor while inpatient       Goals:     Goals: PO intake 75% or greater (Renal labs wnl. Improved fluid status.)       Nutrition Monitoring and Evaluation:      Food/Nutrient Intake Outcomes: Food and Nutrient Intake  Physical Signs/Symptoms Outcomes: Weight, Biochemical Data, Fluid Status or Edema    Discharge Planning:    Too soon to determine     Zaida Mukherjee, RONALD, LD       Dr. Aburto in to see pt. Discharge instructions and home medications reviewed.Pt in stable condition. Electronically signed by Ange Rodney RN on 6/6/2024 at 3:59 PM    Pt taken via w/c to hospital exit with all personal belongings by transport personnel. Pt's spouse in here to drive pt home. Electronically signed by Ange Rodney RN on 6/6/2024 at 4:17 PM       Facility/Department: [facility]  Rehabilitation Initial Assessment: Occupational Therapy  Room: 33/R233-01    NAME: Rayna Hdez  : 1952  MRN: [id number]    Date of Service: 2024    Rehab Diagnosis(es): Impaired mobility and ADL's due to OA flare s/p RTKA  Patient Active Problem List    Diagnosis Date Noted    High risk medication use - 10/17/17 OARRS PM&R, 17 OARRS PM&R, 17 Tox screen positive for opiates, Hydrocodone PM&R, MED CONTRACT 2013    Glenohumeral arthritis, right 2022    Fatigue 2022    Lightheadedness 2022    Chronic renal disease, stage III ([facility]) [082853] 2022    Pain in right finger(s) 2022    Other hereditary and idiopathic neuropathies 2022    Chronic right shoulder pain 2022    Hyperuricemia 2022    Pain in left hip 2022    Post-op pain 2024    Impaired mobility and activities of daily living dt RTKA 2024    Impaired mobility and ADLs 2024    Status post total knee replacement, right 2024    Osteoarthritis of right knee 2024    ARI (acute kidney injury) ([facility]) 2024    Hyperkalemia 2024    Metabolic acidosis 2024    Shortness of breath 2024    Hypotension 2024    Vocal tremor 2023    Acute serous otitis media 2023    Non-recurrent acute serous otitis media of both ears 2023    Hereditary sensory neuropathy 2023    Dermatophytosis of nail 2023    Acquired pes planus of both feet 2023    Opioid dependence with current use ([facility]) 2023    Angina pectoris, unspecified 2023    Atherosclerotic heart disease of native coronary artery with unspecified angina pectoris 2023    Anemia of chronic renal failure, stage 3a ([facility]) 2023    Hypertensive urgency, malignant 2023    Secondary renal hyperparathyroidism ([facility]) 2023    Epistaxis 2023    Gastro-esophageal reflux disease without  Following up on the patient for the first time.  Patient reports having reflux disease.  No chest pain or palpitation.  No abdominal complaints or vomiting.  No other exam loss of conscious or seizure    Chest x-ray, heart regular.  Abdomen soft, no tenderness.  Lower extremities +2 pitting edema in the right leg.    *Functional impairment.  Please refer to physical Occupational Therapy team note for details on functional status and treatment plan.    *Hypertension, poor control.  Increase Diovan to 80 mg 3 times daily.  Continue other BP meds    *Mild CKD.  Monitor    *Anemia, low hemoglobin but stable.  No evidence of acute blood loss.  Check iron study, B12 and folate as well as ferritin.    *Edema right leg.  Venous study rule out DVT.  Continue aspirin twice a day for DVT prophylaxis as recommended by orthopedic team.      *Hyperlipidemia, stable.  Continue statin.       Hospitalist Progress Note      PCP: Hailey Figueroa PA-C    Date of Admission: 5/29/2024    Chief Complaint:      Weakness      HPI:     Patient was seen and examined in rehab for hospitalist follow-up.    The patient noted to have elevated blood pressure and especially at nighttime and in the morning when she wakes up.  Will start the patient on doxazosin at bedtime for BP management.  Patient also reports having urgency and burning with urination.  She denies fever/chills, nausea, vomiting, abdominal pain.  Patient is also negative for chest pain, palpitation, dyspnea, dizziness, and headache.    I have personally reviewed recent lab work, radiology reports, medications, and interdisciplinary notes. The  results and POC have been discussed with the patient.  All questions were answered, patient verbalized understanding.      Subjective:  12 point ROS negative other than mentioned above       Medications:  Reviewed    Infusion Medications   Scheduled Medications    oxyCODONE  10 mg Oral Q8H    cyanocobalamin  1,000 mcg IntraMUSCular Weekly    coenzyme Q10  100 mg Oral Daily    lidocaine  3 patch TransDERmal Daily    dilTIAZem  60 mg Oral 3 times per day    acetaminophen  650 mg Oral Q6H    aspirin  81 mg Oral BID    atorvastatin  10 mg Oral Nightly    docusate sodium  100 mg Oral BID    gabapentin  300 mg Oral Daily    [Held by provider] hydroxychloroquine  200 mg Oral Daily    hydrOXYzine HCl  10 mg Oral TID    losartan  25 mg Oral Dinner    losartan  75 mg Oral Daily with breakfast    pantoprazole  40 mg Oral QAM AC    polyethylene glycol  17 g Oral Daily    sennosides-docusate sodium  1 tablet Oral BID    sodium chloride flush  5-40 mL IntraVENous 2 times per day    Vitamin D  1,000 Units Oral BID     PRN Meds: oxyCODONE **OR** oxyCODONE, acetaminophen, bisacodyl, sodium phosphate, aluminum & magnesium hydroxide-simethicone, baclofen, cyclobenzaprine, sodium chloride flush, hydrALAZINE      Intake/Output  Hospitalist Progress Note      PCP: Hailey Figueroa PA-C    Date of Admission: 5/29/2024    Chief Complaint:      Weakness      HPI:     Patient was seen and examined in rehab for hospitalist follow-up.  Patient is quietly resting in bed, easily aroused to voice, pleasant, alert, and oriented x 4.  Right knee pain is well-controlled on current medications.  Aquacel in place, no signs of bleeding.  Patient was working with PT/OT and tolerated activity well today.  She denies chest pain, palpitation, dyspnea, dizziness, headache.  Patient also denies fever/chills, nausea, vomiting, and abdominal pain.  No change in appetite.  Lab work on 5/29/2024 revealed improvement in kidney function with creatinine 1.13, BUN 15, and EGFR 52.0.  Hemoglobin 9.6 -will continue to monitor.    I have personally reviewed recent lab work, radiology reports, medications, and interdisciplinary notes. The  results and POC have been discussed with the patient.  All questions were answered, patient verbalized understanding.      Subjective:  12 point ROS negative other than mentioned above       Medications:  Reviewed    Infusion Medications   Scheduled Medications    oxyCODONE  10 mg Oral Q8H    cyanocobalamin  1,000 mcg IntraMUSCular Weekly    coenzyme Q10  100 mg Oral Daily    lidocaine  3 patch TransDERmal Daily    acetaminophen  650 mg Oral Q6H    aspirin  81 mg Oral BID    atorvastatin  10 mg Oral Nightly    docusate sodium  100 mg Oral BID    gabapentin  300 mg Oral Daily    [Held by provider] hydroxychloroquine  200 mg Oral Daily    hydrOXYzine HCl  10 mg Oral TID    losartan  25 mg Oral Dinner    losartan  75 mg Oral Daily with breakfast    pantoprazole  40 mg Oral QAM AC    polyethylene glycol  17 g Oral Daily    sennosides-docusate sodium  1 tablet Oral BID    sodium chloride flush  5-40 mL IntraVENous 2 times per day    Vitamin D  1,000 Units Oral BID     PRN Meds: oxyCODONE **OR** oxyCODONE, acetaminophen, bisacodyl,  INDIVIDUALIZED OVERALL REHAB PLAN OF CARE  ADDENDUM TO REHAB PROGRESS NOTE-for audit purposes must also refer to this day's clinical note and combine the information      Date: 2024  Patient Name: Rayna Hdez   Room: R233/R233-01    MRN: [id number]    : 1952  (71 y.o.)  Gender: female       Today 2024 during weekly team meeting, I reviewed the patient Rayna Hdez in detail with the therapists and nurses involved in patient's care gathering complex physiatric data regarding current medical issues, progress in therapies, factors limiting progress, social issues, psychological issues, ongoing therapeutic plans and discharge planning.    Legend:  I= independent Im =Modified independent  S=Supervised SB=stand by VILLATORO=set up CG=contact radha Min= minimal Mod=Moderate Max=maximal Max of 2 =maximal assist of 2 people      CURRENT FUNCTIONAL STATUS:    79-year-old female with severe crippling osteo and rheumatoid arthritis requiring a right total knee replacement under the care of Dr. Williamson on 2024 at [facility] [place] [facility]. She had failed conservative measures including multiple years of exercise, PT, injections, medication management. Due to cardiac history and complex arthritis she has had a more complicated postop medical course requiring acute rehab. Her blood pressure has been difficult to control requiring modification her medication regimen. She also has had a flare of her rheumatoid and osteoarthritis.     NURSING ISSUES:    bladder scanned per Shana PCA after patient had difficulty urinating on toilet with little output. Patients bladder scan showed 624 ml. Patient straight cathed via sterile technique after one attempt per orders. Patient tolerated well and 400 ML clear yellow urine returned. Patient cleaned with peg-wipes afterwards and verbalized no further needs. Bed alarm engaged and call light within reach      Nursing will continue to focus on bowel and bladder  Nephrology Progress Note    Assessment:  CKD 3a stale  Hypertension stable  Mild hyponatremia related to thiazide  Anemia iron def  Hypertension labile nothing to suggest 2nd cause for issue hypertension        Plan:restrict fluids for Na+  Venofir received    Thiazide started  will give  one dose lasix  for ankle edema  Being discharged today possibly  Needs H&H bmp iron 1-2 weeks post discharge by primary  Control pain maybe adding to BP lability    Patient Active Problem List:     Spinal stenosis of lumbar region with neurogenic claudication     High risk medication use - 10/17/17 OARRS PM&R, 12/19/17 OARRS PM&R, 12/20/17 Tox screen positive for opiates, Hydrocodone PM&R, MED CONTRACT 2/2/17     Mixed hyperlipidemia     Neuropathy involving both lower extremities     Rheumatoid arthritis involving multiple sites with positive rheumatoid factor (HCC)     Ankle swelling     COLTON positive     Deformity of finger of right hand     Chronic bilateral low back pain with bilateral sciatica     Cervicalgia     Keratosis pilaris     Seborrheic keratoses     Stage 3 chronic kidney disease, unspecified whether stage 3a or 3b CKD (HCC)     Chronic right shoulder pain     Hyperuricemia     Other hereditary and idiopathic neuropathies     Pain in left hip     Chronic renal disease, stage III ([facility]) [560000]     Pain in right finger(s)     Fatigue     Lightheadedness     Glenohumeral arthritis, right     Opioid dependence with current use ([facility])     Angina pectoris, unspecified     Atherosclerotic heart disease of native coronary artery with unspecified angina pectoris     Anemia of chronic renal failure, stage 3a (HCC)     Hypertensive urgency, malignant     Secondary renal hyperparathyroidism (HCC)     Epistaxis     Gastro-esophageal reflux disease without esophagitis     Latex allergy status     Rheumatoid arthritis ([facility])     Hereditary sensory neuropathy     Dermatophytosis of nail     Acquired pes planus of both feet     Acute  Nephrology Progress Note    Assessment:  Hypertension  CKD-3a  RA  Hyperlipidemia      Plan: discussed renal status and fluctuation with patient No follow-ups on file. bmp    Patient Active Problem List:     Spinal stenosis of lumbar region with neurogenic claudication     High risk medication use - 10/17/17 OARRS PM&R, 12/19/17 OARRS PM&R, 12/20/17 Tox screen positive for opiates, Hydrocodone PM&R, MED CONTRACT 2/2/17     Mixed hyperlipidemia     Neuropathy involving both lower extremities     Rheumatoid arthritis involving multiple sites with positive rheumatoid factor (HCC)     Ankle swelling     COLTON positive     Deformity of finger of right hand     Chronic bilateral low back pain with bilateral sciatica     Cervicalgia     Keratosis pilaris     Seborrheic keratoses     Stage 3 chronic kidney disease, unspecified whether stage 3a or 3b CKD ([facility])     Chronic right shoulder pain     Hyperuricemia     Other hereditary and idiopathic neuropathies     Pain in left hip     Chronic renal disease, stage III ([facility]) [722757]     Pain in right finger(s)     Fatigue     Lightheadedness     Glenohumeral arthritis, right     Opioid dependence with current use ([facility])     Angina pectoris, unspecified     Atherosclerotic heart disease of native coronary artery with unspecified angina pectoris     Anemia of chronic renal failure, stage 3a ([facility])     Hypertensive urgency, malignant     Secondary renal hyperparathyroidism ([facility])     Epistaxis     Gastro-esophageal reflux disease without esophagitis     Latex allergy status     Rheumatoid arthritis ([facility])     Hereditary sensory neuropathy     Dermatophytosis of nail     Acquired pes planus of both feet     Acute serous otitis media     Non-recurrent acute serous otitis media of both ears     Vocal tremor     Osteoarthritis of right knee     ARI (acute kidney injury) ([facility])     Hyperkalemia     Hypotension     Metabolic acidosis     Shortness of breath     Status post total knee  Nephrology Progress Note    Assessment:  Labile GFR  CKD 3a  Hypertension  Right TKR  Anemia      Plan:decrease Diovan freq  Follow labs  Pain management per Dr Martinez Will give Venofir IV for iron def anemia      Patient Active Problem List:     Spinal stenosis of lumbar region with neurogenic claudication     High risk medication use - 10/17/17 OARRS PM&R, 12/19/17 OARRS PM&R, 12/20/17 Tox screen positive for opiates, Hydrocodone PM&R, MED CONTRACT 2/2/17     Mixed hyperlipidemia     Neuropathy involving both lower extremities     Rheumatoid arthritis involving multiple sites with positive rheumatoid factor ([facility])     Ankle swelling     COLTON positive     Deformity of finger of right hand     Chronic bilateral low back pain with bilateral sciatica     Cervicalgia     Keratosis pilaris     Seborrheic keratoses     Stage 3 chronic kidney disease, unspecified whether stage 3a or 3b CKD ([facility])     Chronic right shoulder pain     Hyperuricemia     Other hereditary and idiopathic neuropathies     Pain in left hip     Chronic renal disease, stage III ([facility]) [[id number]]     Pain in right finger(s)     Fatigue     Lightheadedness     Glenohumeral arthritis, right     Opioid dependence with current use ([facility])     Angina pectoris, unspecified     Atherosclerotic heart disease of native coronary artery with unspecified angina pectoris     Anemia of chronic renal failure, stage 3a ([facility])     Hypertensive urgency, malignant     Secondary renal hyperparathyroidism ([facility])     Epistaxis     Gastro-esophageal reflux disease without esophagitis     Latex allergy status     Rheumatoid arthritis ([facility])     Hereditary sensory neuropathy     Dermatophytosis of nail     Acquired pes planus of both feet     Acute serous otitis media     Non-recurrent acute serous otitis media of both ears     Vocal tremor     Osteoarthritis of right knee     ARI (acute kidney injury) ([facility])     Hyperkalemia     Hypotension     Metabolic acidosis     Shortness of  No new symptoms since yesterday.  Her Diovan and Cardizem had been upward  titrated by Nephrology team over the last 48 hours.  Patient was given 3 doses of Lasix 40 mg over the last 24 hours x 3 doses by cardiology team..  This morning her blood pressure is low.    Chest x-ray, heart regular.  Abdomen soft, no tenderness.  Lower extremities +2 pitting edema in the right leg.    *ARI, hypotension secondary to diuretics given and the escalation of her blood pressure meds as described above.   I would give her normal saline 100 mill an hour for 1 L.  Recheck BMP in a.m.  Cardiovascular medications would need to be addressed by nephrology and cardiology team given their involvement in the blood pressure management over the last several days and given their expertise.     *Functional impairment.  Please refer to physical Occupational Therapy team note for details on functional status and treatment plan.    *Mild CKD.  Monitor    *Anemia, low hemoglobin but stable.  No evidence of acute blood loss.  Check iron study, B12 and folate as well as ferritin.    *Edema right leg.  Venous study rule out DVT.  Continue aspirin twice a day for DVT prophylaxis as recommended by orthopedic team.      *Hyperlipidemia, stable.  Continue statin.    Patient will be discharged home tomorrow.  I have completed my portion of the discharge medications    Blood pressure meds and diuretics were left for cardiology and nephrology to address on discharge  .  Pain medications are to be addressed by Dr. Peterson.    Continue aspirin 81 mg twice a day as recommended by orthopedic team    .  Patient has multiple medical issues.  All appear to be stable at this time.    We will follow patient on an as-needed basis.  Please call or alert hospitalist if patient develops any signs or symptoms that may require physical evaluation and intervention.      On discharge, please make sure that patient has an outpatient appointment with his primary care doctor  OCCUPATIONAL THERAPY  INPATIENT REHAB TREATMENT NOTE  [facility]      NAME: Rayna Hdez  : 1952 (71 y.o.)  MRN: [id number]  CODE STATUS: Full Code  Room: R233/R233-01    Date of Service: 2024    Referring Physician: Dr Peterson  Rehab Diagnosis: Impaired mobility and ADL's due to OA flare s/p RTKA    Hospital course:   Comments: Admit Date: 2024  8:52 AM  Inpatient Rehab Referral Date: 24  Narrative of hospital course/history of present illness: 71 y.o. female patient s/p right total knee arthroplasty (elective) performed on  by Dr. Williamson. Patient with Hx of OA and has failed prior conservative measures. Due to cardiac history, did require cardiac clearance which was granted.      Restrictions  Restrictions/Precautions  Restrictions/Precautions: Weight Bearing, Fall Risk   Lower Extremity Weight Bearing Restrictions  Right Lower Extremity Weight Bearing: Weight Bearing As Tolerated    Patient's date of birth confirmed: Yes    SAFETY:  Safety Devices  Safety Devices in place: Yes  Type of devices: All fall risk precautions in place    SUBJECTIVE: \"They left my bathroom a mess.\"    Pain at start of treatment: Yes: 6/10    Pain at end of treatment: Yes: 5/10    Location: R knee, back  Description ache  Nursing notified: Declined  Intervention: Cold applied Repositioned    COGNITION:  Orientation  Overall Orientation Status: Within Normal Limits  Cognition  Overall Cognitive Status: WFL    OBJECTIVE:    Grooming/Oral Hygiene  Assistance Level: Independent  Skilled Clinical Factors: in standing  Toileting  Assistance Level: Modified independent  Toilet Transfers  Technique: Stand step  Equipment: Standard toilet  Additional Factors: Increased time to complete  Assistance Level: Modified independent  Skilled Clinical Factors: grab bars    Functional Mobility  Device: Rolling walker  Activity: To/From bathroom  Assistance Level: Modified independent  Sit to Supine  Assistance Level:  OCCUPATIONAL THERAPY  INPATIENT REHAB TREATMENT NOTE  [facility]      NAME: Rayna Hdez  : 1952 (71 y.o.)  MRN: [id number]  CODE STATUS: Full Code  Room: R233/R233-01    Date of Service: 6/3/2024    Referring Physician: Dr Peterson  Rehab Diagnosis: Impaired mobility and ADL's due to OA flare s/p RTKA    Hospital course:   Comments: Admit Date: 2024  8:52 AM  Inpatient Rehab Referral Date: 24  Narrative of hospital course/history of present illness: 71 y.o. female patient s/p right total knee arthroplasty (elective) performed on  by Dr. Williamson. Patient with Hx of OA and has failed prior conservative measures. Due to cardiac history, did require cardiac clearance which was granted.      Restrictions  Restrictions/Precautions  Restrictions/Precautions: Weight Bearing, Fall Risk   Lower Extremity Weight Bearing Restrictions  Right Lower Extremity Weight Bearing: Weight Bearing As Tolerated             Patient's date of birth confirmed: Yes    SAFETY:  Safety Devices  Safety Devices in place: Yes  Type of devices: All fall risk precautions in place    SUBJECTIVE:   Pt c/o nausea and not feeling well this morning.    Pain at start of treatment: Yes:   Pain at end of treatment: Yes:     Location: R knee  Nursing notified: Yes  RN: ginna  Intervention: RN provided pain medication    COGNITION:  Orientation  Overall Orientation Status: Within Functional Limits  Orientation Level: Oriented X4  Cognition  Overall Cognitive Status: WFL      Pt's current cognitive status is:  Comprehension: Independent  Expression: Mod I  Social Interaction: Independent  Problem Solving: Supervision  Memory: Mod I    OBJECTIVE:         Feeding  Assistance Level: Independent  Grooming/Oral Hygiene  Assistance Level: Independent  Upper Extremity Bathing  Assistance Level: Independent  Lower Extremity Bathing  Assistance Level: Supervision  Upper Extremity Dressing  Assistance Level: Independent  Lower  OCCUPATIONAL THERAPY  INPATIENT REHAB TREATMENT NOTE  [facility]        Date: 2024  Patient Name: Rayna Hdez        MRN: [id number]    Account #: [id number]  : 1952  (71 y.o.)      Restrictions  Restrictions/Precautions  Restrictions/Precautions: Weight Bearing, Fall Risk   Lower Extremity Weight Bearing Restrictions  Right Lower Extremity Weight Bearing: Weight Bearing As Tolerated     Patient's date of birth confirmed: Yes    SAFETY:   All precautions in place    SUBJECTIVE:   \"Im going home\"    Pain at start of treatment: No    Pain at end of treatment: No    OBJECTIVE:    Pt seen to assess and increase independence with footwear    Objective:  ADL:    LE Self-Care:  Chirag overall. Pt able to self manage/complete donning/doffing non slip socks. She reports she only needs assist with the R TRISHA hose     Education:   POC, ADL technique    ASSESSMENT:   Pt tolerated well       PLAN OF CARE:  Strengthening, Balance training, Neuromuscular re-education, Functional mobility training, Endurance training, Self-Care / ADL, Home management training, Coordination training  Pt to continue OT POC until discharge    Patient goals : To return to home  Time Frame for Long Term Goals : Pt within one week of evaluation will deomnstrate progress to treatmetn goals as stated on evaluation to address areas as stated below to increase independence for return to home.  Long Term Goal 1: Pt will increase independence with ADL self care  Long Term Goal 2: Pt will increase independence with ADL transfers  Long Term Goal 3: Ptwill increase tolerance for stand acts completion.    Treatment consisted of:   [x] ADL Training  [] Strengthening   [] Transfer Training    [] DME Education  [] HEP   [] Patient Education  [] Other:    Safety:  Safety Devices  Safety Devices in place:   Type of devices: All fall risk precautions in place      Therapy Time:   Individual Group Co-Treat   Time In 1322       Time Out 1328     OCCUPATIONAL THERAPY  INPATIENT REHAB TREATMENT NOTE  [facility]      NAME: Rayna Hdez  : 1952 (71 y.o.)  MRN: [id number]  CODE STATUS: Full Code  Room: R233/R233-01    Date of Service: 2024    Referring Physician: Dr Peterson  Rehab Diagnosis: Impaired mobility and ADL's due to OA flare s/p RTKA    Hospital course:          Restrictions  Restrictions/Precautions  Restrictions/Precautions: Weight Bearing, Fall Risk   Lower Extremity Weight Bearing Restrictions  Right Lower Extremity Weight Bearing: Weight Bearing As Tolerated             Patient's date of birth confirmed: Yes    SAFETY:  Safety Devices  Safety Devices in place: Yes  Type of devices: All fall risk precautions in place    SUBJECTIVE:       Pain at start of treatment: Yes: 5/10    Pain at end of treatment: Yes: 8/10    Location: R knee  Nursing notified: Yes  RN: Sandra  Intervention: RN provided pain medication    COGNITION:  Orientation  Overall Orientation Status: Within Functional Limits  Orientation Level: Oriented X4  Cognition  Overall Cognitive Status: WFL      Pt's current cognitive status is:  Comprehension: Independent  Expression: Independent  Social Interaction: Independent  Problem Solving: Independent  Memory: Independent    OBJECTIVE:         Feeding  Assistance Level: Independent  Grooming/Oral Hygiene  Assistance Level: Independent  Upper Extremity Bathing  Assistance Level: Independent  Lower Extremity Bathing  Assistance Level: Supervision  Upper Extremity Dressing  Assistance Level: Independent  Lower Extremity Dressing  Assistance Level: Supervision  Putting On/Taking Off Footwear  Assistance Level: Minimal assistance  Toileting  Assistance Level: Supervision  Toilet Transfers  Technique: Stand step  Equipment: Standard toilet  Additional Factors: Increased time to complete  Assistance Level: Supervision  Tub/Shower Transfers  Skilled Clinical Factors: sponge bath at the sink-seated for all tasks  OCCUPATIONAL THERAPY  INPATIENT REHAB TREATMENT NOTE  [facility]      NAME: Rayna Hdez  : 1952 (71 y.o.)  MRN: [id number]  CODE STATUS: Full Code  Room: R233/R233-01    Date of Service: 2024    Referring Physician: Dr Peterson  Rehab Diagnosis: Impaired mobility and ADL's due to OA flare s/p RTKA    Hospital course:   Comments: Admit Date: 2024  8:52 AM  Inpatient Rehab Referral Date: 24  Narrative of hospital course/history of present illness: 71 y.o. female patient s/p right total knee arthroplasty (elective) performed on  by Dr. Williamson. Patient with Hx of OA and has failed prior conservative measures. Due to cardiac history, did require cardiac clearance which was granted.      Restrictions  Restrictions/Precautions  Restrictions/Precautions: Weight Bearing, Fall Risk   Lower Extremity Weight Bearing Restrictions  Right Lower Extremity Weight Bearing: Weight Bearing As Tolerated             Patient's date of birth confirmed: Yes    SAFETY:  Safety Devices  Safety Devices in place: Yes  Type of devices: All fall risk precautions in place    SUBJECTIVE:  Subjective  Subjective: Pt stated that her  was coming in around 9:00 am.    Pain at start/end of treatment: Yes: 2/10 at rest, with movement pain is a 7/10-8/10 per pt statement    Location: R knee  Nursing notified: Not Applicable  Intervention: RN provided pain medication    COGNITION:  Orientation  Overall Orientation Status: Within Normal Limits  Orientation Level: Oriented X4  Cognition  Overall Cognitive Status: WFL      Pt's current cognitive status is:  Comprehension: Independent  Expression: Independent  Social Interaction: Independent  Problem Solving: Supervision  Memory: Independent    OBJECTIVE:         Feeding  Assistance Level: Independent  Grooming/Oral Hygiene  Assistance Level: Independent  Upper Extremity Bathing  Assistance Level: Independent  Lower Extremity Bathing  Assistance Level: Modified  Patient complains of pain to right knee. PRN oxycodone given.  Electronically signed by Karley Guerrero LPN on 6/6/2024 at 9:54 AM     Patient is feeling much better today.  She is standing in therapy room.  She does not feel dizzy    Chest x-ray, heart regular.  Abdomen soft, no tenderness.  Lower extremities +2 pitting edema in the right leg.    *ARI, hypotension, resolved after IV fluid infusion  Cardiovascular medications would need to be addressed by nephrology and cardiology team given their involvement in the blood pressure management over the last several days and given their expertise.     *Functional impairment.  Please refer to physical Occupational Therapy team note for details on functional status and treatment plan.    *Mild CKD.  Monitor    *Anemia, low hemoglobin but stable.  No evidence of acute blood loss.  Check iron study, B12 and folate as well as ferritin.  Patient will need to have anemia investigation that will need to be done in the outpatient setting by PCP.  This may include but not limited to needing to have EGD, colonoscopy, GYN exam and age-appropriate cancer screening    *Edema right leg.  Venous study rule out DVT.  Continue aspirin twice a day for DVT prophylaxis as recommended by orthopedic team.    *Hyperlipidemia, stable.  Continue statin.    Patient will be discharged home tomorrow.  I have completed my portion of the discharge medications    Blood pressure meds and diuretics were left for cardiology and nephrology to address on discharge  .  Pain medications are to be addressed by Dr. Peterson.    Continue aspirin 81 mg twice a day as recommended by orthopedic team    .  Patient has multiple medical issues.  All appear to be stable at this time.    Discharging the patient is by no means the end of the care that he would need or require.  Furthermore I did not addressed all of his subacute to chronic medical issues.  I did not address all of the abnormalities seen on his labs and imaging during this hospitalization or prior to this admission.    The patient would likely require to have additional workup,  Patient was noted to have edema 2-3+ edema BLE.  Will resume patient on home Lasix 20 mg p.o. twice daily.    Patient has been admitted to the acute rehab on 5/29/2024.  Nurses were reminded to review and verify home medications and notify the provider when the med list is completed several times.   I personally placed 2 nursing communication orders asking to verify home medications STAT.  As of right now, the home med list is still not completed.  This significantly affects the quality of patient care.   Patient's bp this morning was 173/63 and night shift nurse gave PRN hydralazine. Patient got up to go to bathroom and felt lightheaded and recheck at 747am was 113/53. Patient laid back down. Recheck at 822am was 121/61. Scheduled lasix and cardizem given. Held diovan and hygroton for now. Will recheck later. Patient complaining of 7/10 pain to right knee. PRN oxycodone given. Patient also feeling anxious but stated this nurse calmed her down.  Electronically signed by Karley Guerrero LPN on 6/5/2024 at 8:43 AM    BP at this time is 95/35, 78. Dr. Quiros was on the floor and is aware.  Electronically signed by Karley Guerrero LPN on 6/5/2024 at 9:29 AM    BP at this time is 148/52, 76.  Electronically signed by Karley Guerrero LPN on 6/5/2024 at 12:29 PM    Patient complaining of 7/10 pain to right knee. PRN oxycodone given.  Electronically signed by Karley Guerrero LPN on 6/5/2024 at 1:50 PM    Patient complains of 3/10 pain to right knee. Scheduled oxycontin given.  Electronically signed by Karley Guerrero LPN on 6/5/2024 at 5:10 PM       Physical Therapy  Facility/Department: [facility]  Rehabilitation Discharge note    NAME: Rayna Hdez  : 1952  MRN: [id number]    Date of discharge: 24        Past Medical History:   Diagnosis Date    Abnormal electromyogram (EMG) 2009    SENSORIMOTOR NEUROPATHY OF BLE, RIGHT WORSE THAN LEFT, SUSPICIOUS FOR RIGHT S1 RADIC    Angina pectoris (HCC)     Angina pectoris (HCC) 2019    sees Dr. Freda ROMERO    Ankle pain     Atherosclerotic heart disease of native coronary artery with unspecified angina pectoris 2023    Bleeding ulcer     Colon polyp 2017    Dermatitis     Fibromyalgia     Foot pain     Gastro-esophageal reflux disease without esophagitis 2023    Hyperlipidemia     Hypertension     Infection of intervertebral disc (pyogenic), lumbar region ([facility]) 2018    Kidney disease     Low back pain     MRSA (methicillin resistant staph aureus) culture positive     Nasal bleeding     Neck pain     Neuropathy     Obesity     Osteoarthritis     PSVT (paroxysmal supraventricular tachycardia) ([facility])     Shingles outbreak     SI (sacroiliac) pain     Stage 3 chronic kidney disease, unspecified whether stage 3a or 3b CKD ([facility]) 2022    Stenosis     Vitamin D deficiency      Past Surgical History:   Procedure Laterality Date    CARDIAC CATHETERIZATION  10/2016    CARDIOVERSION      2001 x3    CAUDAL BLOCK N/A 2022    performed by Dr. Peterson    CAUDAL BLOCK N/A 2024    by Dr. Peterson    CAUDAL BLOCK  2024    Performed by Dr. Peterson    CERVICAL FUSION  2011    Dr Evans with bone graft and plate    COLONOSCOPY  2021    [facility]    ENDOSCOPIC ULTRASOUND (LOWER) N/A 2019    EGD/ EUS performed by El Recio MD at [facility]    ESOPHAGOSCOPY      HAND FUSION  2005    HYSTERECTOMY (CERVIX STATUS UNKNOWN)      HYSTERECTOMY, TOTAL ABDOMINAL (CERVIX REMOVED)      JOINT REPLACEMENT Left     tka    LIVER BIOPSY      MENISCECTOMY  10/31/2011     Physical Therapy  Physical Therapy Rehab Treatment Note  Facility/Department: [facility]  [facility]       NAME: Rayna Hdez  : 1952 (71 y.o.)  MRN: [id number]  CODE STATUS: Full Code    Date of Service: 2024       Restrictions:  Restrictions/Precautions: Weight Bearing, Fall Risk  Lower Extremity Weight Bearing Restrictions  Right Lower Extremity Weight Bearing: Weight Bearing As Tolerated       SUBJECTIVE:    I over did it the other day.     Pain   5/10 pre and post [ain       OBJECTIVE:     Roll Left  Assistance Level: Stand by assist  Roll Right  Assistance Level: Stand by assist  Sit to Supine  Assistance Level: Stand by assist  Supine to Sit  Skilled Clinical Factors: bed slightly elevated      Ambulation  Surface: Level surface  Device: Rolling walker  Distance: short distances throughout room  Activity: Within Unit  Additional Factors: Verbal cues;Hand placement cues;Increased time to complete  Assistance Level: Stand by assist  Gait Deviations: Slow arslan;Decreased step length bilateral;Unsteady gait;Path deviations;Decreased weight shift right      PT Exercises  Exercise Treatment: Objective measures: -15 degrees of extension to 80 degrees of flexion  PROM Exercises: PROM knee flexion supine  A/AROM Exercises: supine hip flexion, hip abduction , qs, ap , seated knee flexion  Static Standing Balance Exercises: stating standing pulling pants up and washing hands       Activity Tolerance  Activity Tolerance: Patient tolerated treatment well;Patient limited by pain        ASSESSMENT/PROGRESS TOWARDS GOALS:   Assessment  Assessment: Only ambulated around room and to rest room due to pts request of over doing it the other day. Pt with one LOB stating at sink to wash hands min assist to recover. Pt with cont swelling and ice applied post treatment.  Activity Tolerance: Patient tolerated treatment well;Patient limited by pain  Discharge Recommendations: Continue to assess pending  Physical Therapy Rehab Treatment Note  Facility/Department[facility]  Room: [place] [address]       NAME: Rayna Hdez  : 1952 (71 y.o.)  MRN: [id number]  CODE STATUS: Full Code    Date of Service: 2024       Restrictions:  Restrictions/Precautions: Weight Bearing, Fall Risk  Lower Extremity Weight Bearing Restrictions  Right Lower Extremity Weight Bearing: Weight Bearing As Tolerated       SUBJECTIVE:   Subjective: \"This is my \"    Pain  Pain: pt reports 5/10 pain pre/post session- received meds      OBJECTIVE:     Transfers  Surface: Wheelchair;From bed;To mat;From mat  Additional Factors: Verbal cues;Hand placement cues;Increased time to complete  Device: Walker  Sit to Stand  Assistance Level: Stand by assist  Skilled Clinical Factors: vc's for hand placement, improved ability to come to stand  Stand to Sit  Assistance Level: Stand by assist  Skilled Clinical Factors: good eccentric control    Ambulation  Surface: Level surface  Device: Rolling walker  Distance: short distances throughout room, 150', 50'  Activity: Within Unit  Additional Factors: Verbal cues;Hand placement cues;Increased time to complete  Assistance Level: Stand by assist  Gait Deviations: Slow arslan;Decreased step length bilateral;Unsteady gait;Path deviations;Decreased weight shift right  Skilled Clinical Factors: Pt with improved tolerance to gait this session, able to complete gait distance this session with overall improved pattern and no significant safety concerns. Less pain noted this session with gait as opposed to previous.    PT Exercises  PROM Exercises: seated HS/gastroc stretch with STM in RLE for edema reduction  A/AROM Exercises: seated AP/LAQ/Marches/Hip Abd/Hip Add x10 BLE  Resistive Exercises: seated YTB HS curls/Hip Abd x10 BLE  Circulation/Endurance Exercises: CP applied to R knee for pain relief ~10 mins  Dynamic Standing Balance Exercises: standing step-ups on 2 inch block with focus on progressing  Physical Therapy Rehab Treatment Note  Facility/Department: [facility]  Room: [place] [address]       NAME: Rayna Hdez  : 1952 (71 y.o.)  MRN: [id number]  CODE STATUS: Full Code    Date of Service: 2024       Restrictions:  Restrictions/Precautions: Weight Bearing, Fall Risk  Lower Extremity Weight Bearing Restrictions  Right Lower Extremity Weight Bearing: Weight Bearing As Tolerated       SUBJECTIVE:   Subjective: \"Yesterday was hard\"    Pain  Pain: 6/10 R knee. Meds prior.      OBJECTIVE:   Transfers  Surface: Wheelchair;To chair with arms;From chair with arms  Additional Factors: Verbal cues  Device: Walker  Sit to Stand  Assistance Level: Stand by assist;Supervision  Skilled Clinical Factors: vc's for hand placement, improved ability to come to stand  Stand to Sit  Assistance Level: Stand by assist;Supervision  Skilled Clinical Factors: decreased eccentric control with descent  Car Transfer  Assistance Level: Stand by assist  Skilled Clinical Factors: visual and verbal demo provided, pt completes with good carryover, increased time d/t pain    Ambulation  Surface: Level surface  Device: Rolling walker  Distance: 45', 35', 90'  Activity: Within Unit  Additional Factors: Verbal cues  Assistance Level: Stand by assist  Gait Deviations: Slow arslan;Decreased heel strike right;Wide base of support    Stairs  Stair Height: 6''  Device: One handrail;Cane  Number of Stairs: 4  Additional Factors: Verbal cues;Hand placement cues;Non-reciprocal going up;Non-reciprocal going down;Increased time to complete  Assistance Level: Stand by assist  Skilled Clinical Factors - Comments: vc's for technique and sequencing, no physical assist provided, pt with good sequencing carryover from previous session    PT Exercises  Exercise Treatment: heel slides on  RLE with focus on increasing ROM  A/AROM Exercises: seated AP/LAQ/Marches/Hip Abd/Hip Add x10 BLE  Resistive Exercises: seated YTB HS curls/Hip Abd  Physical Therapy Rehab Treatment Note  [place] [facility] [place] [facility]  [facility]       NAME: Rayna Hdez  : 1952 (71 y.o.)  MRN: [id number]  CODE STATUS: Full Code    Date of Service: 2024       Restrictions:  Restrictions/Precautions: Weight Bearing, Fall Risk  Lower Extremity Weight Bearing Restrictions  Right Lower Extremity Weight Bearing: Weight Bearing As Tolerated       SUBJECTIVE:   Subjective: \"I am doing well\"    Pain  Pain: 7/10 pain pre/post session      OBJECTIVE:   Bed Mobility  Overall Assistance Level: Modified Independent  Additional Factors: Verbal cues;Increased time to complete;Head of bed flat;Without handrails  Roll Left  Assistance Level: Modified independent  Roll Right  Assistance Level: Modified independent  Sit to Supine  Assistance Level: Modified independent  Supine to Sit  Assistance Level: Modified independent  Scooting  Assistance Level: Modified independent    Transfers  Surface: To mat;From mat;Wheelchair;To chair with arms;From chair with arms  Additional Factors: Verbal cues  Device: Walker  Sit to Stand  Assistance Level: Modified independent  Stand to Sit  Assistance Level: Modified independent  Car Transfer  Assistance Level: Modified independent    Ambulation  Surface: Level surface  Device: Rolling walker  Distance: 75', 35', 100', 200'  Activity: Within Unit  Additional Factors: Verbal cues  Assistance Level: Modified independent;Supervision  Skilled Clinical Factors: Supervision long distances, Laura for short distances.    Stairs  Stair Height: 6''  Device: One handrail;Cane  Number of Stairs: 4  Additional Factors: Verbal cues;Hand placement cues;Non-reciprocal going up;Non-reciprocal going down;Increased time to complete  Assistance Level: Supervision  Skilled Clinical Factors - Comments: vc's for technique and sequencing, no physical assist provided, pt with good sequencing carryover from previous session    PT Exercises  Exercise Treatment:  Physical Therapy Rehab Treatment Note  Facility[facility]Department: [facility]  Room: [place] [id number]       NAME: Rayna Hdez  : 1952 (71 y.o.)  MRN: [id number]  CODE STATUS: Full Code    Date of Service: 6/3/2024       Restrictions:  Restrictions/Precautions: Weight Bearing, Fall Risk  Lower Extremity Weight Bearing Restrictions  Right Lower Extremity Weight Bearing: Weight Bearing As Tolerated       SUBJECTIVE:   Subjective: \"I\"m so very tired from the meds\"    Pain  Pain: 5/10 right knee. meds prior  Cp during tx      OBJECTIVE:   Orientation  Overall Orientation Status: Within Functional Limits  Cognition  Overall Cognitive Status: WFL         Bed Mobility  Overall Assistance Level: Stand By Assist  Additional Factors: Verbal cues;Increased time to complete;Head of bed flat;Without handrails  Roll Left  Assistance Level: Stand by assist  Roll Right  Assistance Level: Stand by assist  Sit to Supine  Assistance Level: Stand by assist  Supine to Sit  Assistance Level: Stand by assist  Scooting  Assistance Level: Stand by assist    Transfers  Surface: Wheelchair;From bed;To mat;From mat  Additional Factors: Verbal cues  Device: Walker  Sit to Stand  Assistance Level: Stand by assist;Supervision  Skilled Clinical Factors: vc's for hand placement, improved ability to come to stand  Stand to Sit  Assistance Level: Stand by assist  Skilled Clinical Factors: decreased eccentric control with descent  Bed To/From Chair  Technique: Stand step  Assistance Level: Stand by assist;Supervision  Stand Pivot  Assistance Level: Stand by assist;Supervision  Skilled Clinical Factors: vc's and SBA for safety, bed to WC    Gait limited by other activities this pm. Limited by destination, steps and restroom. 40 feet x 4 with rest breaks and turns.   Stairs  Stair Height: 6''  Device: One handrail;Cane  Number of Stairs: 4  Assistance Level: Stand by assist         Neuromuscular Education  Static standing with ww.     PT  Physical Therapy Rehab Treatment Note  [facility] [place] [facility]  [facility]       NAME: Rayna Hdez  : 1952 (71 y.o.)  MRN: [id number]  CODE STATUS: Full Code    Date of Service: 6/3/2024       Restrictions:fall risk          SUBJECTIVE:   Subjective: \"I have  UTI now\"    Pain  Pain: 5/10 right knee pain. meds taken but late she reported.      OBJECTIVE:   Orientation  Overall Orientation Status: Within Functional Limits  Cognition  Overall Cognitive Status: WFL         Bed Mobility  Overall Assistance Level: Stand By Assist  Additional Factors: Verbal cues;Increased time to complete;Head of bed flat;Without handrails  Roll Left  Assistance Level: Stand by assist  Roll Right  Assistance Level: Stand by assist  Sit to Supine  Assistance Level: Stand by assist  Scooting  Assistance Level: Stand by assist    Transfers  Surface: Wheelchair;From bed;To mat;From mat  Additional Factors: Verbal cues  Device: Walker  Sit to Stand  Assistance Level: Stand by assist;Supervision  Stand to Sit  Assistance Level: Stand by assist  Skilled Clinical Factors: decreased eccentric control with descent  Bed To/From Chair  Technique: Stand step  Assistance Level: Stand by assist;Supervision  Stand Pivot  Assistance Level: Stand by assist;Supervision    Ambulation  Surface: Level surface  Device: Rolling walker  Distance: 150 feet with numerous turns and changes in terrain  Activity: Within Unit  Additional Factors: Verbal cues  Assistance Level: Stand by assist  Gait Deviations: Slow arslan;Decreased heel strike right;Wide base of support              Neuromuscular Education  Neuromuscular Comments: sit to stands x 5 for technique and endurance. standing static. alternating stepping fwd/bwd in standing.  Balance  Sitting Balance: Supervision  Standing Balance: Supervision    PT Exercises  Exercise Treatment: seated marching and laq. ROM seated 82 degrees of flexion. -10 extension.  Exercise Equipment: heel  Physical Therapy Rehab Treatment Note  Facility/Department[place] [facility] [place] [facility]  Room: [place] [address]       NAME: Rayna Hdez  : 1952 (71 y.o.)  MRN: [id number]  CODE STATUS: Full Code    Date of Service: 2024       Restrictions:  Restrictions/Precautions: Weight Bearing, Fall Risk  Lower Extremity Weight Bearing Restrictions  Right Lower Extremity Weight Bearing: Weight Bearing As Tolerated       SUBJECTIVE:   Subjective: \"I will be done in a moment\"    Pain  Pain: pt reports 6/10 pain pre/post session- received meds      OBJECTIVE:   Transfers  Surface: Standard toilet;Wheelchair  Additional Factors: Verbal cues;Hand placement cues;Increased time to complete  Device: Walker  Sit to Stand  Assistance Level: Stand by assist  Skilled Clinical Factors: vc's for hand placement, improved ability to come to stand  Stand to Sit  Assistance Level: Stand by assist  Skilled Clinical Factors: good eccentric control    Ambulation  Surface: Level surface  Device: Rolling walker  Distance: short distances throughout room, 75' x 2  Activity: Within Unit  Additional Factors: Verbal cues;Hand placement cues;Increased time to complete  Assistance Level: Stand by assist  Gait Deviations: Slow arslan;Decreased step length bilateral;Unsteady gait;Path deviations;Decreased weight shift right  Skilled Clinical Factors: Pt with improved tolerance to gait this session, able to complete gait distance this session with overall improved pattern and no significant safety concerns. Less pain noted this session with gait as opposed to previous.    PT Exercises  PROM Exercises: seated HS/gastroc stretch with STM in RLE for edema reduction  Circulation/Endurance Exercises: CP applied to R knee for pain relief ~10 mins  Postural Correction Exercises: posterior/anterior shoulder rolls x15 ea, scapular retraction x15  Exercise Equipment: heel slides on  RLE with focus on increasing ROM     Activity Tolerance  Activity Tolerance:  Physical Therapy Rehab Treatment Note  Facility/Department: [facility] [place] [facility]  Room: [place] [address]       NAME: Rayna Hdez  : 1952 (71 y.o.)  MRN: [id number]  CODE STATUS: Full Code    Date of Service: 2024       Restrictions:  Restrictions/Precautions: Weight Bearing, Fall Risk  Lower Extremity Weight Bearing Restrictions  Right Lower Extremity Weight Bearing: Weight Bearing As Tolerated       SUBJECTIVE:   Subjective: \"I am doing okay\"    Pain  Pain: R knee 4/10 at rest and 7/10 pain during mobility tasks.      OBJECTIVE:   PT Exercises  Exercise Treatment: Objective measures: 22 degrees of extension to 73 degrees of flexion  PROM Exercises: seated HS/gastroc stretch with STM in RLE for edema reduction  A/AROM Exercises: seated AP/LAQ/Marches/Hip Abd/Hip Add x10 BLE     Activity Tolerance  Activity Tolerance: Patient limited by pain;Treatment limited secondary to medical complications    ASSESSMENT/PROGRESS TOWARDS GOALS:   Assessment  Assessment: Pt limited d/t pain throughout all activities, pleasant and motivated throughout session but requires increased time and effort to complete all tasks. Pt with significant swelling in RLE. Left in chair at end of session to promote OOB for meals.  Activity Tolerance: Patient limited by pain;Treatment limited secondary to medical complications    Goals:  Long Term Goals  Long Term Goal 1: indep and efficient bed mobility  Long Term Goal 2: indep bed, chair and car transfers  Long Term Goal 3: indep gait 50 feet with appropriate device -  feet  Long Term Goal 4: SBA with 4 stairs with appropriate rais for safe home entry    PLAN OF CARE/Safety:   Safety Devices  Type of Devices: All fall risk precautions in place;Call light within reach;Left in chair;Chair alarm in place      Therapy Time:   Individual   Time In 1130   Time Out 1200   Minutes 30      Minutes: 30  Transfer/Bed mobility training:  Gait training:  Neuro re education:  Therapeutic ex:  Physical Therapy Rehab Treatment Note  Facility/Department: [facility] [place] [facility]  Room: [place] [address]       NAME: Rayna Hdez  : 1952 (71 y.o.)  MRN: [id number]  CODE STATUS: Full Code    Date of Service: 2024       Restrictions:  Restrictions/Precautions: Weight Bearing, Fall Risk  Lower Extremity Weight Bearing Restrictions  Right Lower Extremity Weight Bearing: Weight Bearing As Tolerated       SUBJECTIVE:   Subjective: \"I got scared\"    Pain  Pain: denies pain pre      OBJECTIVE:   Transfers  Surface: Wheelchair;From mat;To mat  Additional Factors: Verbal cues;Hand placement cues;Increased time to complete  Device: Walker  Sit to Stand  Assistance Level: Stand by assist  Skilled Clinical Factors: vc's for hand placement, improved ability to come to stand  Stand to Sit  Skilled Clinical Factors: good eccentric control    Ambulation  Surface: Level surface  Device: Rolling walker  Distance: 150' x 2  Activity: Within Unit  Additional Factors: Verbal cues;Hand placement cues;Increased time to complete  Assistance Level: Stand by assist  Gait Deviations: Slow arslan;Decreased step length bilateral;Unsteady gait;Path deviations;Decreased weight shift right  Skilled Clinical Factors: Pt with improved tolerance to gait this session, able to significantly increase gait distance this session with overall improved pattern and no significant safety concerns. Less pain noted this session with gait as opposed to previous.    PT Exercises  Exercise Treatment: Objective measures: 15 degrees of extension to 77 degrees of flexion  PROM Exercises: seated HS/gastroc stretch with STM in RLE for edema reduction  A/AROM Exercises: seated AP/LAQ/Marches/Hip Abd/Hip Add x10 BLE  Resistive Exercises: seated YTB HS curls/Hip Abd x10 BLE  Circulation/Endurance Exercises: CP applied to R knee for pain relief ~10 mins  Dynamic Standing Balance Exercises: standing step-ups on 2 inch block with focus on progressing towards stair  Physical Therapy Rehab Treatment Note  Facility/Department: [facility] [place] [facility]  Room: [place] [address]       NAME: Rayna Hdez  : 1952 (71 y.o.)  MRN: [id number]  CODE STATUS: Full Code    Date of Service: 2024       Restrictions:  Restrictions/Precautions: Weight Bearing, Fall Risk  Lower Extremity Weight Bearing Restrictions  Right Lower Extremity Weight Bearing: Weight Bearing As Tolerated       SUBJECTIVE:   Subjective: \"I got straight cathed last night\"    Pain  Pain: pt reports 5/10 pain pre/post session- received meds      OBJECTIVE:   Bed Mobility  Additional Factors: Verbal cues;Increased time to complete;Head of bed flat;Without handrails (on therapy mat)  Roll Left  Assistance Level: Stand by assist  Roll Right  Assistance Level: Stand by assist  Sit to Supine  Assistance Level: Stand by assist  Skilled Clinical Factors: vc's for technique and sequencing, increased time and effort  Supine to Sit  Assistance Level: Stand by assist  Skilled Clinical Factors: good technique and facilation  Scooting  Assistance Level: Stand by assist  Skilled Clinical Factors: scooting towards EOM    Transfers  Surface: Wheelchair;From mat;To mat  Additional Factors: Verbal cues;Hand placement cues;Increased time to complete  Device: Walker  Sit to Stand  Assistance Level: Stand by assist  Skilled Clinical Factors: vc's for hand placement, improved ability to come to stand  Stand to Sit  Skilled Clinical Factors: good eccentric control    Ambulation  Surface: Level surface  Device: Rolling walker  Distance: 30' x 2, 15' x 2  Activity: Within Unit  Additional Factors: Verbal cues;Hand placement cues;Increased time to complete  Assistance Level: Stand by assist  Gait Deviations: Slow arslan;Decreased step length bilateral;Unsteady gait;Path deviations;Decreased weight shift right  Skilled Clinical Factors: Pt with noted tremors with all on feet activity contributing to increased unsteadiness with gait requiring close SBA  Physical Therapy Rehab Treatment Note  [facility] [place] [facility]  [facility]       NAME: Rayna Hdez  : 1952 (71 y.o.)  MRN: [id number]  CODE STATUS: Full Code    Date of Service: 2024       Restrictions:  Restrictions/Precautions: Weight Bearing, Fall Risk  Lower Extremity Weight Bearing Restrictions  Right Lower Extremity Weight Bearing: Weight Bearing As Tolerated       SUBJECTIVE:   Subjective: \"I am doing okay\"    Pain  Pain: 7-8/10 R knee. Meds prior.      OBJECTIVE:   Transfers  Surface: Wheelchair;To mat;From mat  Additional Factors: Verbal cues  Device: Walker  Sit to Stand  Assistance Level: Stand by assist;Supervision  Skilled Clinical Factors: vc's for hand placement, improved ability to come to stand  Stand to Sit  Assistance Level: Stand by assist;Supervision  Skilled Clinical Factors: decreased eccentric control with descent    Ambulation  Surface: Level surface  Device: Rolling walker  Distance: 115', 75', 200', 100'  Activity: Within Unit  Additional Factors: Verbal cues  Assistance Level: Stand by assist  Gait Deviations: Slow arslan;Decreased heel strike right;Wide base of support  Skilled Clinical Factors: Pt with improved tolerance to gait this session, able to complete gait distance this session with overall improved pattern and no significant safety concerns. Less pain noted this session with gait as opposed to previous.    PT Exercises  Exercise Treatment: ROM seated 85 degrees of flexion. -9 extension.  PROM Exercises: PROM knee flexion seated, seated HS/gastroc stretch with STM in RLE for edema reduction  A/AROM Exercises: seated AP/LAQ/Marches/Hip Abd/Hip Add x10 BLE  Resistive Exercises: seated RTB HS curls/Hip Abd x10 BLE  Circulation/Endurance Exercises: CP applied to R knee for pain relief ~10 mins    Activity Tolerance  Activity Tolerance: Patient tolerated treatment well    ASSESSMENT/PROGRESS TOWARDS GOALS:   Assessment  Assessment: Pt able to improve  Physical Therapy Rehab Treatment Note  [facility] [place] [facility] [place] [facility]  [facility]       NAME: Rayna Hdez  : 1952 (71 y.o.)  MRN: [id number]  CODE STATUS: Full Code    Date of Service: 2024     Restrictions:  Restrictions/Precautions: Weight Bearing, Fall Risk  Lower Extremity Weight Bearing Restrictions  Right Lower Extremity Weight Bearing: Weight Bearing As Tolerated     SUBJECTIVE:   Subjective: Pt states she is tired from getting up early and from the pain meds.    Pain  Pain: 6/10 pain R knee pre/post session    OBJECTIVE:         PT Exercises  Exercise Treatment: seated R knee flex/ext stretch 12amgt1; seated LAQ, march, hip add, hip abd x10ea  Reviewed and issued for HEP.        Education Provided: Home Exercise Program  Education  Education Given To: Patient  Education Provided: Home Exercise Program  Education Provided Comments: Reviewed and issued HEP for seated and supine LE strengthening and stretching.  Education Method: Demonstration;Verbal;Teach Back;Printed Information/Hand-outs  Barriers to Learning: None  Education Outcome: Verbalized understanding;Demonstrated understanding      ASSESSMENT/PROGRESS TOWARDS GOALS:   Assessment: Pt tolerated making up missed time.  Focused on review and issuing of HEP.  Pt demo'd ability to complete with handout.  Goals:  Long Term Goals  Long Term Goal 1: indep and efficient bed mobility  Long Term Goal 2: indep bed, chair and car transfers  Long Term Goal 3: indep gait 50 feet with appropriate device -  feet  Long Term Goal 4: SBA with 4 stairs with appropriate rais for safe home entry    PLAN OF CARE/Safety: Pt scheduled to D/C tomorrow.       Therapy Time:   Individual   Time In 1530   Time Out 1554   Minutes 24      Minutes:20 min of time made up from missed time earlier this date.    Transfer/Bed mobility trainin  Gait trainin  Neuro re education:0  Therapeutic ex:24    Nelia Espinoza PTA, 24 at 4:04 PM    Pt assessment completed. Patient alert, oriented and cooperative. Complains of back pain and right knee pain, pain medications effective. BLE edema noted, right greater than left. Medications given per MAR. PRN medications given for BP increase. Patient denies any further needs or complaints. Call light within reach.    Pt assessment completed. Patient alert, oriented and cooperative. Complains of lower back pain and right knee pain, pain medications effective. BLE edema noted, right greater than left. Medications given per MAR. Patient denies any further needs or complaints. Pt assisted with ambulation to their restroom. Call light within reach and bed alarm active.    Pt assessment completed. Patient alert, oriented x4. A Patient complains of right knee and leg pain 7/10. Medications given per MAR. Pt reports a small decrease in pain. Pt BP monitored throughout the shift. Medications are effective. Denies any further needs or complaints at this time. Call light within reach. Call light within reach, bed in lowest position, bed alarm on, and bed is locked. Pt had to be straight cath due to retaining urine by BLAIRE White due to pt feeling more comfortable with female care.    Pt assessment completed. Patient alert, oriented x4. Pt is cooperative but concerned about their medications not being similar to other floors they've been on. A Patient complains of right knee and leg pain. Medications given per MAR. Pt reports a small decrease in pain. NP informed of elevate BP and hydralazine prn was given and BP monitored throughout the shift. Denies any further needs or complaints at this time. Call light within reach. Pt educated on current medications and reassured about being seen by a doctor the following day. Pt denies any other needs. Call light within reach, bed in lowest position, bed alarm on, and bed is locked.    Pt assessment completed. Pt alert and oriented x4. Pt complains of 6/10 pain while in bed. Medications given per MAR. Incision PATT glued, no drainage, and well approximated. Pt denies any further needs or complaints. Call light within reach, bed locked, bed alarm on, and bed in lowest position.    Pt assessment completed. Pt alert and oriented x4. Pt complains of 7-8/10 pain while in bed. Medications given per MAR. Pt reports reduction in pain. Incision PATT glued, no drainage, and well approximated. Pt assisted with ambulation to the restroom and back to their bed. Pt denies any further needs or complaints. Call light within reach, bed locked, bed alarm on, and bed in lowest position.    Pt cont of urine. Bladder scan for 21 ml. Sandra RUDD made aware. Electronically signed by Coby Coulter LPN on 6/3/2024 at 5:35 PM     Pt. Up on her bed. Appetite good. BM today. Walking with walker. Legs edematous ice used through out the day to the operative knee. Medicated for pain with scheduled oxycontin and prn roxicontin.   Lab evaluated. Culture positive for Klebsiella pneumoniae. Macrobid started today. Pt. Believes it has upset her stomach.   Evaluated by Nephrology regarding ARI.        Pt. Up with stand by assist to the bathroom.   Pt. Evaluated by   Pt. Evaluated by Dr. Pang per her request due to concern with medication.   Pt. Does not want to take tylenol due to her \"fatty liver disease\" pt. Does not wish to take attarax.   Pt. Was questioned if she should be taking hydralazine. Aisha Chand explained this was prescribed for her elevated blood pressure. Pt. Agreeable to take.   Pt. Prefers to take the oxy 10 mg. For pain. Did not believe the flexeril provided any relieve.    [facility]    Acute  Rehabilitation  MUSIC THERAPY      Date:  6/4/2024        Patient Name: Rayna Hdez       MRN: [id number]        YOB: 1952 (71 y.o.)       Gender: female  Diagnosis: Impaired mobility and ADL's due to OA flare s/p RTKA  Referring Practitioner: Dr Peterson    RESTRICTIONS/PRECAUTIONS:  Restrictions/Precautions: Weight Bearing, Fall Risk  Vision: Impaired  Hearing: Exceptions to WFL  Hearing Exceptions: Hard of hearing/hearing concerns, Bilateral hearing aid    Subjective/Observation:   Patient declined participating in individual music therapy session at 12:40pm stating she was simply too tired at this time as she was about to take a nap. Patient does want music therapy and hopes she can have some time on Thursday before her d/c.      Assessment/Plan:      [x] Patient would like to have music therapy, just not today. [facility]      [] Patient would like to have music therapy another time, however their D/C is before [facility] will be back on unit.        *If patient is still on rehab unit, or comes back to rehab unit, [facility] will attempt to see patient then if time allows.      [] Patient does not want music therapy. [facility] unless pt specifically requests.       KIRAN Neely    6/4/2024  Electronically signed by Mouna Kowalski on 6/4/2024 at 12:58 PM     impairments found/functional limitations in following categories/risk reduction/prevention     Patient complains of burning with urination. CNP notified. Urine tests ordered                 ROS x10:  The patient also complains of severely impaired mobility and activities of daily living.  Otherwise no new problems with vision, hearing, nose, mouth, throat, dermal, cardiovascular, GI, , pulmonary, musculoskeletal, psychiatric or neurological. See also Acute Rehab PM&R H&P.       Vital signs:  BP (!) 182/64   Pulse 86   Temp 97.7 °F (36.5 °C) (Oral)   Resp 16   Ht 1.702 m (5' 7\")   Wt 100.5 kg (221 lb 9.6 oz)   SpO2 96%   BMI 34.71 kg/m²   I/O:   PO/Intake:  fair PO intake,   reg diet    Bowel:   continent postop constipation improving  Bladder: continent  no sanchez  General:  Patient is well developed,   adequately nourished, and    well kempt.     HEENT:    Pupils equal, hearing intact to loud voice, external inspection of ear and nose benign.  Inspection of lips, tongue and gums benign    Musculoskeletal: No significant change in strength or tone.  All joints stable.      Inspection and palpation of digits and nails show no clubbing, cyanosis or inflammatory conditions.   Neuro/Psychiatric: Affect: flat but pleasant.  Alert and oriented to person, place and situation without cues.  No significant change in deep tendon reflexes or sensation  Lungs:  Diminished, CTA-B. Respiration effort is   normal at rest.     Heart:   S1 = S2,   RRR.       Abdomen:  Soft, non-tender, no enlargement of liver or spleen.  Extremities:  Right knee postop pain and swelling, lower extremity edema and tenderness   Skin:   Intact to general survey, right knee replacement    Rehabilitation:  Physical Therapy:   Bed mobility:  Bed mobility  Rolling to Left: Modified independent (05/30/24 1143)  Rolling to Right: Modified independent (05/30/24 1143)  Supine to Sit: Stand by assistance (required 35 sec to complete) (05/30/24 1143)  Sit to Supine: Stand by assistance (05/30/24 1143)  Scooting: Stand by assistance (05/30/24  Hypotension     Metabolic acidosis     Shortness of breath     Status post total knee replacement, right     Post-op pain     Impaired mobility and activities of daily living dt RTKA     Impaired mobility and ADLs      Subjective:  Admit Date: 5/29/2024    Interval History: stable    Medications:  Scheduled Meds:   valsartan  80 mg Oral BID    dilTIAZem  90 mg Oral 2 times per day    furosemide  20 mg Oral BID    nitrofurantoin (macrocrystal-monohydrate)  100 mg Oral 2 times per day    doxazosin  2 mg Oral Nightly    oxyCODONE  10 mg Oral Q8H    cyanocobalamin  1,000 mcg IntraMUSCular Weekly    coenzyme Q10  100 mg Oral Daily    lidocaine  3 patch TransDERmal Daily    aspirin  81 mg Oral BID    atorvastatin  10 mg Oral Nightly    docusate sodium  100 mg Oral BID    gabapentin  300 mg Oral Daily    [Held by provider] hydroxychloroquine  200 mg Oral Daily    pantoprazole  40 mg Oral QAM AC    polyethylene glycol  17 g Oral Daily    sennosides-docusate sodium  1 tablet Oral BID    Vitamin D  1,000 Units Oral BID     Continuous Infusions:    CBC:   Recent Labs     06/02/24  0453 06/03/24  0512   WBC 4.7* 4.6*   HGB 7.8* 8.0*    204     CMP:    Recent Labs     06/01/24  0516 06/02/24  0453 06/03/24  0512   * 133* 134*   K 4.1 4.2 4.0   CL 99 100 99   CO2 21 22 22   BUN 15 15 16   CREATININE 1.10* 1.09* 1.18*   GLUCOSE 107* 96 103*   CALCIUM 8.9 8.4* 8.4*   LABGLOM 53.8* 54.3* 49.4*     Troponin: No results for input(s): \"TROPONINI\" in the last 72 hours.  BNP: No results for input(s): \"BNP\" in the last 72 hours.  INR: No results for input(s): \"INR\" in the last 72 hours.  Lipids: No results for input(s): \"CHOL\", \"LDLDIRECT\", \"TRIG\", \"HDL\", \"AMYLASE\", \"LIPASE\" in the last 72 hours.  Liver: No results for input(s): \"AST\", \"ALT\", \"ALKPHOS\", \"PROT\", \"LABALBU\", \"BILITOT\" in the last 72 hours.    Invalid input(s): \"BILDIR\"  Iron:  No results for input(s): \"FERRITIN\" in the last 72 hours.    Invalid input(s):  No     [x] Yes   Location:     Rating:   /10    Comment(s): did not rate     ANXIETY ASSESSMENT    Before MT:      [x] No     [] Yes   Rating:  /10    Comment(s):    After MT:         [x] No     [] Yes   Rating:   /10    Comment(s):       ASSESSMENT/OBSERVATIONS:     Patient's music interests and/or background: michael     Patient tolerated today’s treatment session:      [x] Good          [] Fair          [] Poor         Comment(s): Patient found sitting up in her bed when [facility] arrived to offer Music Therapy Services. Patient accepted music therapy visit.  MT-BC provided live, patient preferred music on guitar and voice.  Patient actively engaged in the music therapy by discussing topics related to their hospital stay, discussing topics related to the music,  making song suggestions, singing along at times and by actively listening to the music. Patient responded positively to the music therapy as evidence by improved mood, increased relaxation, increased spiritual well being, increased socialization and by making positive comments about the music therapy.     PLAN:      [x] Pt interested in having music therapy again.     [] [facility] will attempt to see pt again another day, if time allows.      [x] Pt's planned d/c date is before MT-BC is scheduled on unit again.     [] Pt NOT interested in having music therapy again.            KIRAN Neely    6/6/2024  Electronically signed by Mouna Kowalski on 6/6/2024 at 11:02 AM      Status post total knee replacement, right     Post-op pain     Impaired mobility and activities of daily living dt RTKA     Impaired mobility and ADLs      Subjective:  Admit Date: 5/29/2024    Interval History: doing fine mild lefg knee pain    Medications:  Scheduled Meds:   furosemide  20 mg Oral BID    nitrofurantoin (macrocrystal-monohydrate)  100 mg Oral 2 times per day    doxazosin  2 mg Oral Nightly    oxyCODONE  10 mg Oral Q8H    cyanocobalamin  1,000 mcg IntraMUSCular Weekly    coenzyme Q10  100 mg Oral Daily    lidocaine  3 patch TransDERmal Daily    dilTIAZem  60 mg Oral 3 times per day    aspirin  81 mg Oral BID    atorvastatin  10 mg Oral Nightly    docusate sodium  100 mg Oral BID    gabapentin  300 mg Oral Daily    [Held by provider] hydroxychloroquine  200 mg Oral Daily    losartan  25 mg Oral Dinner    losartan  75 mg Oral Daily with breakfast    pantoprazole  40 mg Oral QAM AC    polyethylene glycol  17 g Oral Daily    sennosides-docusate sodium  1 tablet Oral BID    Vitamin D  1,000 Units Oral BID     Continuous Infusions:    CBC:   Recent Labs     06/01/24  0516 06/02/24  0453   WBC 5.9 4.7*   HGB 8.8* 7.8*    188     CMP:    Recent Labs     05/31/24  0502 06/01/24  0516 06/02/24  0453   * 133* 133*   K 4.2 4.1 4.2    99 100   CO2 21 21 22   BUN 12 15 15   CREATININE 1.00* 1.10* 1.09*   GLUCOSE 100* 107* 96   CALCIUM 9.0 8.9 8.4*   LABGLOM 60.3 53.8* 54.3*     Troponin: No results for input(s): \"TROPONINI\" in the last 72 hours.  BNP: No results for input(s): \"BNP\" in the last 72 hours.  INR: No results for input(s): \"INR\" in the last 72 hours.  Lipids: No results for input(s): \"CHOL\", \"LDLDIRECT\", \"TRIG\", \"HDL\", \"AMYLASE\", \"LIPASE\" in the last 72 hours.  Liver: No results for input(s): \"AST\", \"ALT\", \"ALKPHOS\", \"PROT\", \"LABALBU\", \"BILITOT\" in the last 72 hours.    Invalid input(s): \"BILDIR\"  Iron:  No results for input(s): \"FERRITIN\" in the last 72 hours.    Invalid input(s):  Transfers  Equipment: Standard toilet  Additional Factors: Increased time to complete;With handrails  Assistance Level: Modified independent    Sit to Stand  Assistance Level: Modified independent  Stand to Sit  Assistance Level: Modified independent    Education:  Education  Education Given To: Patient  Education Provided: Transfer Training  Education Provided Comments: Transfer from w/c to bed. Pt demonstrated good balance and safety during transfer.  Education Method: Verbal  Barriers to Learning: None  Education Outcome: Demonstrated understanding    ASSESSMENT:  Assessment: Partiicpated with good effort. No loss of balance observed when standing in AM session. Pt requires increased time to complete transfers due to pain.  Activity Tolerance: Patient tolerated treatment well;Patient limited by pain    PLAN OF CARE:  Strengthening, Balance training, Neuromuscular re-education, Functional mobility training, Endurance training, Self-Care / ADL, Home management training, Coordination training  Pt to continue OT POC until discharge    Patient goals : To return to home  Time Frame for Long Term Goals : Pt within one week of evaluation will deomnstrate progress to treatmetn goals as stated on evaluation to address areas as stated below to increase independence for return to home.  Long Term Goal 1: Pt will increase independence with ADL self care  Long Term Goal 2: Pt will increase independence with ADL transfers  Long Term Goal 3: Ptwill increase tolerance for stand acts completion.      Therapy Time:   Individual Group Co-Treat   Time In 1130       Time Out 1200         Minutes 30         ADL/IADL trainin minutes  Therapeutic activities: 25 minutes     Electronically signed by:    PATT aDniels,   2024, 2:18 PM                and discharge complex medical conditions, severe pain, and complex social situations      Bowel function- constipation  Plans to address- laxative     Bladder function-  [employer]    Plans to address- schedule voids before bed and therapy     Skin deficits- dressing changes   Plans to address- special mattress     Hydration/Nutritional deficits- monitoring for dysphagia  Plans to address-Push PO, assist with feeds as needed      BP- decline in BP intake is a concern  Plans to address- check ortho BPs before therapies-and use abdominal binder and TEDs as needed    Pt and Family training goals-  teach home technology options like Danni use and home assistive devices      Focus on achieving ADL goals with co-treating with OT when possible. Focus on cognition and co treat with SLP when possible.      PHYSICAL THERAPY  Bed mobility:  Bed mobility  Rolling to Left: Modified independent (05/30/24 1143)  Rolling to Right: Modified independent (05/30/24 1143)  Supine to Sit: Stand by assistance (required 35 sec to complete) (05/30/24 1143)  Sit to Supine: Stand by assistance (05/30/24 1143)  Scooting: Stand by assistance (05/30/24 1143)  Bed Mobility Comments: HOB flat with no rails - increased time, energy and effort required (05/30/24 1143)  Bed Mobility  Overall Assistance Level: Stand By Assist (06/03/24 1020)  Additional Factors: Verbal cues;Increased time to complete;Head of bed flat;Without handrails (06/03/24 1020)  Overall Assistance Level: Stand By Assist (06/03/24 1020)  Additional Factors: Verbal cues;Increased time to complete;Head of bed flat;Without handrails (06/03/24 1020)  Roll Left  Assistance Level: Stand by assist (06/03/24 1020)  Roll Right  Assistance Level: Stand by assist (06/03/24 1020)  Sit to Supine  Assistance Level: Stand by assist (06/03/24 1020)  Skilled Clinical Factors: vc's for technique and sequencing, increased time and effort (05/31/24 1123)  Supine to Sit  Assistance Level: Stand by  medically. Pt and  verbalize understanding with all education provided.        ASSESSMENT/PROGRESS TOWARDS GOALS:   Assessment  Assessment: Session limited to in bed activities this session d/t OT reporting pt with low BP throughout prior session and pt significantly symptomatic upon arrival.  present to complete family training, however unable to demonstrate d/t low BP. Pt and  educated on current mobility levels.  Activity Tolerance: Treatment limited secondary to medical complications    Goals:  Long Term Goals  Long Term Goal 1: indep and efficient bed mobility  Long Term Goal 2: indep bed, chair and car transfers  Long Term Goal 3: indep gait 50 feet with appropriate device -  feet  Long Term Goal 4: SBA with 4 stairs with appropriate rais for safe home entry    PLAN OF CARE/Safety:   Safety Devices  Type of Devices: All fall risk precautions in place;Bed alarm in place;Call light within reach;Left in bed      Therapy Time:   Individual   Time In 1000   Time Out 1030   Minutes 30      Minutes: 30  Transfer/Bed mobility trainin  Gait training:  Neuro re education:  Therapeutic ex: 25  Pt missed 30 mins this AM session d/t low BP and pt symptomatic with OOB activity.     Delroy Bauer PTA, 24 at 10:44 AM              post gait.    PT Exercises  Exercise Treatment: heel slides on  RLE with focus on increasing ROM  PROM Exercises: seated HS/gastroc stretch with STM in RLE for edema reduction  A/AROM Exercises: seated AP/LAQ/Marches/Hip Abd/Hip Add x10 BLE  Circulation/Endurance Exercises: CP applied to R knee for pain relief ~10 mins  Postural Correction Exercises: posterior/anterior shoulder rolls x15 ea, scapular retraction x15     Activity Tolerance  Activity Tolerance: Patient limited by pain;Treatment limited secondary to medical complications    ASSESSMENT/PROGRESS TOWARDS GOALS:   Assessment  Assessment: Pt limited d/t pain throughout all activities, pleasant and motivated throughout session but requires increased time and effort to complete all tasks. Pt with significant swelling in RLE. Able to complete gait this date with WC follow utilized d/t increased pain.  Activity Tolerance: Patient limited by pain;Treatment limited secondary to medical complications    Goals:  Long Term Goals  Long Term Goal 1: indep and efficient bed mobility  Long Term Goal 2: indep bed, chair and car transfers  Long Term Goal 3: indep gait 50 feet with appropriate device -  feet  Long Term Goal 4: SBA with 4 stairs with appropriate rais for safe home entry    PLAN OF CARE/Safety:   Safety Devices  Type of Devices: Call light within reach;Left in chair;Chair alarm in place      Therapy Time:   Individual   Time In 1430   Time Out 1530   Minutes 60      Minutes: 60  Transfer/Bed mobility training: 15  Gait training: 15  Neuro re education:  Therapeutic ex: 30      Delroy Bauer PTA, 05/30/24 at 4:03 PM              with spaced vc sequencing between tasks.  Pt completed this task to improve functional activity tolerance and strength in order to improve functional transfers and mobility with safety during functional task performance skills.        Provided pt with kitchen mobility item/retrieval task.  Pt completed kitchen mobility with 2 w/w device  Provided instruction  prn for task but pt demo good task completion with good safety awareness of walker placement.  Pt completed item retrieval from overhead cupboards, drawers from waist to knee height, and moving items into/out of fridge/freezer, microwave and oven with Mod Ind.  Pt completed all tasks to improve safety and independence with mobility to complete functional ADL/IADL tasks.         Pt completed laundry folding task seated at table top level to improve functional act tolerance and UB mobility to continue improving functional ADL ability.  Pt folded towels, pillow cases and wash cloths using B UEs with slight overhead reaching prn while folding.   Pt was able to fold all with efficient time and good pacing.  Pt demonstrated no difficulty with task.       Education:  Education  Education Provided: ADL Function  Education Provided Comments: Kitchen mobility training, but pt demo good balance, and placement of walker  Education Method: Verbal  Barriers to Learning: None  Education Outcome: Verbalized understanding;Continued education needed      ASSESSMENT:  Assessment: has continued pain in R knee, some redness in the middle in a horizontal line going across the knee; pt very cooperative.  Activity Tolerance: Patient tolerated treatment well      PLAN OF CARE:  Strengthening, Balance training, Neuromuscular re-education, Functional mobility training, Endurance training, Self-Care / ADL, Home management training, Coordination training  Pt to continue OT POC until discharge    Patient goals : To return to home  Time Frame for Long Term Goals : Pt within one week of  x10 BLE  Resistive Exercises: seated RTB HS curls/Hip Abd x10 BLE  Circulation/Endurance Exercises: CP applied to R knee for pain relief ~10 mins     Activity Tolerance  Activity Tolerance: Patient tolerated treatment well    ASSESSMENT/PROGRESS TOWARDS GOALS:   Assessment  Assessment: Improved tolerance to session this PM, BP taken at onset of session in supine/seated/standing and maintains WNL. Able to tolerate gait this session, stairs and car deferred till tomorrow for safety. Pt pleasant and motivated throughout.  Activity Tolerance: Patient tolerated treatment well    Goals:  Long Term Goals  Long Term Goal 1: indep and efficient bed mobility  Long Term Goal 2: indep bed, chair and car transfers  Long Term Goal 3: indep gait 50 feet with appropriate device -  feet  Long Term Goal 4: SBA with 4 stairs with appropriate rais for safe home entry    PLAN OF CARE/Safety:   Safety Devices  Type of Devices: All fall risk precautions in place;Chair alarm in place;Left in chair      Therapy Time:   Individual   Time In 1430   Time Out 1530   Minutes 60      Minutes: 60  Transfer/Bed mobility training: 15  Gait trainin  Neuro re education:  Therapeutic ex: 20      Delroy Bauer PTA, 24 at 3:36 PM                6/1/2024, 3:50 PM                needed. Pain increases to 6-7/10 during mobility tasks    Prior Level of Function:  Social/Functional History  Lives With: Spouse (one dog - 1yo border collie)  Type of Home: House  Home Layout: One level  Home Access: Stairs to enter with rails  Entrance Stairs - Rails: Right (grab bar on right , no AD on left)  Bathroom Shower/Tub: Walk-in shower, Shower chair with back  Bathroom Toilet: Handicap height  Bathroom Equipment: Grab bars in shower, Shower chair, Hand-held shower  Bathroom Accessibility: Walker accessible  Home Equipment: Walker - Rolling, Cane, Wheelchair - Manual, Rollator (transport chair)  Has the patient had two or more falls in the past year or any fall with injury in the past year?: Yes (fell once trippping on chair, balance issues - since sepsis 2018- some persisting balance issues)  Receives Help From: Family  ADL Assistance: Independent  Homemaking Assistance: Independent  Homemaking Responsibilities: Yes  Ambulation Assistance: Independent (cane inside home, has transport wheelchair with multiple medical deficits and extensive surgical history)  Transfer Assistance: Independent  Active : Yes  Mode of Transportation: [place]  Occupation: Retired  Type of Occupation: Dental hygenist, 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  Leisure & Hobbies: Read, goes camping with spouse, road trip    OBJECTIVE:   Vision/Hearing:  Vision  Vision Exceptions: Wears glasses at all times  Hearing: Exceptions to WFL  Hearing Exceptions: Hard of hearing/hearing concerns;Bilateral hearing aid    Cognition/Observation:  Overall Orientation Status: Within Functional Limits  Follows Commands: Within Functional Limits         ROM:  RLE PROM: WFL  RLE General PROM: except knee approx 20-70 degrees  LLE PROM: WFL    Strength:  Strength RLE  Comment: 3-/5 hip and knee, 3/5 df  Strength LLE  Comment: 4.5  Strength Other  Other: fair abdominal activation    Neuro:  Balance  Sitting - Static: Good  Sitting - Dynamic: Good  Standing - Static:  signed by Hazel Rowan, JOSE - CNP on 6/2/2024 at 3:38 PM     Clay ISBELL DO   90 mg at 06/04/24 0813    valsartan (DIOVAN) tablet 80 mg  80 mg Oral TID Yosef Healy MD   80 mg at 06/04/24 1359    chlorthalidone (HYGROTON) tablet 25 mg  25 mg Oral Daily Umang Aburto MD   25 mg at 06/04/24 0813    furosemide (LASIX) tablet 40 mg  40 mg Oral BID Umang Aburto MD   40 mg at 06/04/24 0812    nitrofurantoin (macrocrystal-monohydrate) (MACROBID) capsule 100 mg  100 mg Oral 2 times per day Christina Clark MD   100 mg at 06/04/24 0813    calcium carbonate (TUMS) chewable tablet 500 mg  500 mg Oral TID PRN Hazel Rowan APRN - CNP   500 mg at 06/02/24 1623    doxazosin (CARDURA) tablet 2 mg  2 mg Oral Nightly Hazel Rowan APRN - CNP   2 mg at 06/03/24 2017    oxyCODONE (OXYCONTIN) extended release tablet 10 mg  10 mg Oral Q8H Rosaline Peterson DO   10 mg at 06/04/24 1359    oxyCODONE (ROXICODONE) immediate release tablet 5 mg  5 mg Oral Q4H PRN Rosaline Peterson DO        Or    oxyCODONE (ROXICODONE) immediate release tablet 10 mg  10 mg Oral Q4H PRN Rosaline Peterson, DO   10 mg at 06/04/24 0812    cyanocobalamin injection 1,000 mcg  1,000 mcg IntraMUSCular Weekly Rosaline Peterson DO   1,000 mcg at 05/30/24 0944    coenzyme Q10 capsule 100 mg  100 mg Oral Daily ScRosaline garcía DO   100 mg at 06/04/24 0813    lidocaine 4 % external patch 3 patch  3 patch TransDERmal Daily Rosaline Peterson DO   3 patch at 06/03/24 0902    bisacodyl (DULCOLAX) suppository 10 mg  10 mg Rectal Daily PRN Rosaline Peterson, DO        sodium phosphate (FLEET) rectal enema 1 enema  1 enema Rectal Daily PRN Rosaline Peterson DO        aluminum & magnesium hydroxide-simethicone (MAALOX) 200-200-20 MG/5ML suspension 15 mL  15 mL Oral Q6H PRN Russell, Od[place] T, APRN - CNP   15 mL at 06/03/24 0906    aspirin EC tablet 81 mg  81 mg Oral BID Rosana Wellsia ANA, APRN - CNP   81 mg at 06/04/24 0812    atorvastatin (LIPITOR) tablet 10 mg  10 mg Oral Nightly Rosana Wellsia ANA, APRN - CNP   10 mg at  Diagnosis  Dysphagia Diagnosis: Swallow function appears WFL  Oral Phase - Comment: Oral phase of the swallow mechanism was WFL  Pharyngeal Phase Comment: Suspect pharyngeal phase of the swallow mechanism is WFL  Dysphagia Outcome Severity Scale: Level 6: Within functional limits/Modified independence     Recommendations  Requires SLP Intervention: Yes  Therapeutic Interventions: Diet tolerance monitoring;Patient/Family education  Duration of Treatment: 1 week  Frequency of Treatment: 1x f/u during a meal to monitor diet tolerance    Prognosis  Speech Therapy Prognosis  Prognosis: Good  Prognosis Considerations: Motivation;Potential    Education  Patient Education: Pt was educated on BSE results  Patient Education Response: Verbalizes understanding  Individuals consulted  Consulted and agree with results and recommendations: Patient;RN  RN Name: Iman    [x]  Iman RN notified     Treatment/Goals  Short-term Goals  Timeframe for Short-term Goals: 1 week  Goal 1: Patient will tolerate Regular solids with thin liquids with adequate mastication and oral clearance of solids with all po trials with use of alternating liquids and solids.  Long-term Goals  Timeframe for Long-term Goals: 1 week  Goal 1: Pt will tolerate LRD without overt s/s of aspiration.    Safety Devices  Safety Devices  Safety Devices in place: Yes  Type of devices: Call light within reach;Chair alarm in place    Pain Assessment  Patient does not c/o pain.    Pain Re-assessment  Patient does not c/o pain.    Dysphagia Outcomes Severity Scale  Dysphagia Outcome Severity Scale: Level 6: Within functional limits/Modified independence    Therapy Time  SLP Individual Minutes  Time In: 0940  Time Out: 1000  Minutes: 20              Signature: Electronically signed by JAMES Dave on 5/30/2024 at 12:36 PM       able to participate in acute intensive comprehensive inpatient rehabilitation program including PT/OT to improve balance, ambulation, ADL’s, and to improve the P/AROM.  Therapeutic modifications regarding activities in therapies, place, amount of time per day and intensity of therapy made daily.  In bed therapies or bedside therapies prn.   Bowel postop constipation and Bladder dysfunction  , Neurogenic bowel and bladder:  frequent toileting, ambulate to bathroom with assistance, check post void residuals.  Check for C.difficile x1 if >2 loose stools in 24 hours, continue bowel & bladder program.  Monitor bowel and bladder function.  Lactinex 2 PO every AC.  MOM prn, Brown Bomb prn, Glycerin suppository prn, enema prn.  Encourage therapy and nursing to co-treat and problem solve re continence.    Severe back pain, postop right total knee replacement pain, as well as generalized OA pain: reassess pain every shift and prior to and after each therapy session, give prn Tylenol and consider scheduled Tylenol, modalities prn in therapy, masage, Lidoderm, K-pad prn.   scheduled AM pain meds.  Skin healing right knee incision and breakdown risk:  continue pressure relief program.  Daily skin exams and reports from nursing.  Fatigue due to nutritional and hydration deficiency: Add and titrate vitamin B12 vitamin D and CoQ10 continue to monitor I&O’s, calorie counts prn, dietary consult prn.  Add healthy snack at night.  Acute episodic insomnia with situational adjustment disorder:  prn Ambien, monitor for day time sedation.  Falls risk elevated:  patient to use call light to get nursing assistance to get up, bed and chair alarm.  Elevated DVT risk: progressive activities in PT, continue prophylaxis TRISHA hose, siobhan and meds-see MAR.   Lovenox for DVT prophylax  Complex discharge planning:  Begin medication reconciliation, patient and family education, and medication simplification coordinating follow-up care with case  improve the P/AROM.  Therapeutic modifications regarding activities in therapies, place, amount of time per day and intensity of therapy made daily.  In bed therapies or bedside therapies prn.   Bowel postop constipation and Bladder dysfunction  , Neurogenic bowel and bladder:  frequent toileting, ambulate to bathroom with assistance, check post void residuals.  Check for C.difficile x1 if >2 loose stools in 24 hours, continue bowel & bladder program.  Monitor bowel and bladder function.  Lactinex 2 PO every AC.  MOM prn, Brown Bomb prn, Glycerin suppository prn, enema prn.  Encourage therapy and nursing to co-treat and problem solve re continence.    Severe back pain, postop right total knee replacement pain, as well as generalized OA pain: reassess pain every shift and prior to and after each therapy session, give prn Tylenol and consider scheduled Tylenol, modalities prn in therapy, masage, Lidoderm, K-pad prn. Consider scheduled AM pain meds.  Skin healing right knee incision and breakdown risk:  continue pressure relief program.  Daily skin exams and reports from nursing.  Fatigue due to nutritional and hydration deficiency: Add and titrate vitamin B12 vitamin D and CoQ10 continue to monitor I&O’s, calorie counts prn, dietary consult prn.  Add healthy snack at night.  Acute episodic insomnia with situational adjustment disorder:  prn Ambien, monitor for day time sedation.  Falls risk elevated:  patient to use call light to get nursing assistance to get up, bed and chair alarm.  Elevated DVT risk: progressive activities in PT, continue prophylaxis TRISHA hose, elevation and meds-see MAR.   Complex discharge planning:  DC-6/6/24 home with  and HHC.  Final weekly team meeting Monday to re-assess progress towards goals, discuss and address social, psychological and medical comorbidities and to address difficulties they may be having progressing in therapy.  Patient and family education is in progress.  The  night.  Acute episodic insomnia with situational adjustment disorder:  prn Ambien, monitor for day time sedation.  Falls risk elevated:  patient to use call light to get nursing assistance to get up, bed and chair alarm.  Elevated DVT risk: progressive activities in PT, continue prophylaxis TRISHA hose, elevation and meds-see MAR.  Not on Lovenox for now because of postop anemia  Complex discharge planning:  DC-6/6/24 home with  and HHC.  Final weekly team meeting Monday to re-assess progress towards goals, discuss and address social, psychological and medical comorbidities and to address difficulties they may be having progressing in therapy.  Patient and family education is in progress.  The patient is to follow-up with their family physician after discharge.        Complex Active General Medical Issues that complicate care Assess & Plan:    Fatigue-  high-dose vitamin D, recheck vitamin D level out after discharge,    Spinal stenosis of lumbar region with neurogenic claudication,  Rheumatoid arthritis involving multiple sites with positive rheumatoid factor,  Cervicalgia-lowest effective dose of pain medication  HTN=Acute rehab to monitor heart rate and rhythm with the option of telemetry and the effects of chronotropic medication with respect to increasing physical activity and exercise in PT, OT, ADLs with medication titration to lowest effective dosing.  Continue blood signs every shift focusing on heart rate, rhythm and blood pressure checks with orthostatic checks-monitoring the effect of exercise, therapy and posture.  Consult hospitalist for backup medical and adjust/add medications.  Monitor heart rate and blood pressure as well as medications effects on vital signs before during and after therapy with especial focus on preventing orthostasis and falls risk.    Stage 3 chronic kidney disease, unspecified whether stage 3a or 3b CKD -Eliminate toxic medications, monitor I's and O's focusing on urine        Radiology:  XR KNEE RIGHT (1-2 VIEWS)    Result Date: 5/28/2024  EXAMINATION: TWO XRAY VIEWS OF THE RIGHT KNEE 5/28/2024 10:39 am COMPARISON: None. HISTORY: ORDERING SYSTEM PROVIDED HISTORY: post op TECHNOLOGIST PROVIDED HISTORY: Of operative side while in recovery room. Reason for exam:->post op What reading provider will be dictating this exam?->CRC FINDINGS: Two views are submitted.  Patient is status post right total knee arthroplasty. Satisfactory aligned. No dislocation. No peg-implant fracture.     Status post right total knee arthroplasty     US GUIDED NEEDLE PLACEMENT    Result Date: 5/28/2024  Radiology exam is complete. No Radiologist dictation. Please follow up with ordering provider.     US KIDNEY/BLADDER    Result Date: 5/19/2024  * * *Final Report* * * DATE OF EXAM: May 16 2024  7:58AM   U   1055  -  US KIDNEY/BLADDER  / ACCESSION #  [id number] PROCEDURE REASON: multiple diagnoses      * * * * Physician Interpretation * * * *  EXAMINATION:   RENAL ULTRASOUND CLINICAL HISTORY: Acute kidney injury.  Stage III chronic kidney disease. TECHNIQUE:  Sonography of the kidneys and urinary bladder was performed.   Images were obtained and stored in a permanent archive. MQ:  UR_1 COMPARISON: Renal ultrasound 04/12/2023. RESULT: Right Kidney:      -Renal length: 9.4 cm      -Parenchyma: Mild increased echogenicity of the parenchyma.  Diffuse parenchymal thinning present.      -Collecting system: No hydronephrosis.      -Calculus: No echogenic, shadowing calculus.      -Lesion:  None. Left Kidney:      -Renal length: 9.4 cm      -Parenchyma: Parenchyma echogenicity is increased.  Diffuse parenchymal thinning present.      -Collecting system: No hydronephrosis.      -Calculus: No echogenic, shadowing calculus.      -Lesion:  None. Bladder: Not fully distended.    IMPRESSION: No hydronephrosis. Findings compatible with chronic medical renal disease. : BALAJI   Transcribe Date/Time: May 19 2024

## 2024-09-26 NOTE — CONSULT NOTE ADULT - CONVERSATION DETAILS
WIth Dionne (patient asleep, not able to participate in discussion):  - Introduced role of palliative care in symptom management, care planning, support, and transitions in care to those with chronic and serious illness.  Support offered.  - Dionne reports that the patient is  and that she is the only child: Dionne is the highest surrogate under the Family Healthcare Decisions Act.    _ Dionne shares that prior to his occipital infarct, he was living independently, driving.  Reports that much has changed in his quality of life since CVA: change in vision, and that he is trying to adjust.   - States that since the CVA, he requires additional attention (required LEI following CVA, then fell at rehab, has become confused/agitated in the hospital).  Impact of CVA noted.   - Independent of CVA w/hemorrhagic conversion, discussed changes in thinking caused by  delirium, commonly seen in older adults as they receive care in multiple setting.  Noted that while the hope is to return to baseline cognition, it may not occur.  Reviewed delirium precautions  - Daughter reports that patient was diagnosed with MM, bladder cancer, however he still enjoyed a good quality of life.  - Reports that after CVA w/hemorrhagic conversion, he could still hold a good conversation.  - Dionne reports that patient values his independence and being at home.  Shared worry that patient is not expected to enjoy the independence he previously had.  - In the context of value placed on independence, being at home, broached code status.  Limited success of CPR discussed.  Noted the CPR and intubation are not expected to enable patient to get back home, if they become necessary.  Full code orders maintained.  - Encouraged Dionne to communicate patient's values, so that they can inform medical care, and that interventions can be contextualized within their capacity to meet the patient's goals of care.  - Support provided.  All questions answered.    No MOLST completed, as there are no limits on care.    Total time spent in GOC discussion: 25 min

## 2024-09-26 NOTE — DIETITIAN INITIAL EVALUATION ADULT - PERTINENT MEDS FT
MEDICATIONS  (STANDING):  atorvastatin 40 milliGRAM(s) Oral at bedtime  chlorhexidine 2% Cloths 1 Application(s) Topical <User Schedule>  folic acid 1 milliGRAM(s) Oral daily  insulin lispro (ADMELOG) corrective regimen sliding scale   SubCutaneous Before meals and at bedtime  levothyroxine 75 MICROGram(s) Oral daily  metoprolol succinate ER 25 milliGRAM(s) Oral daily  pantoprazole    Tablet 40 milliGRAM(s) Oral before breakfast  senna 2 Tablet(s) Oral at bedtime  tamsulosin 0.4 milliGRAM(s) Oral at bedtime    MEDICATIONS  (PRN):  acetaminophen     Tablet .. 650 milliGRAM(s) Oral every 6 hours PRN Mild Pain (1 - 3), Moderate Pain (4 - 6), Severe Pain (7 - 10)  melatonin 3 milliGRAM(s) Oral at bedtime PRN Insomnia  OLANZapine Injectable 2.5 milliGRAM(s) IntraMuscular every 8 hours PRN agitation

## 2024-09-26 NOTE — SWALLOW BEDSIDE ASSESSMENT ADULT - ORAL PHASE
Within functional limits removed trial via gloved finger/Decreased anterior-posterior movement of the bolus/Delayed oral transit time

## 2024-09-26 NOTE — CONSULT NOTE ADULT - ASSESSMENT
incomplete     83 yo M with a PMHx of recent occipital infarct on 8/24/24 at another institution d/c'd home on plavix for one week, DM, HTN, HLD, GERD, who presents following a fall. CT showing hemorrhagic conversion.    5/21/24- PET/CT-Few small FDG-avid mediastinal and bilateral hilar lymph nodes are nonspecific. A benign etiology is favored. Cluster of tiny nodules and peripheral right middle lobe may represent mucoid impacted distal airways. Enlarged prostate gland with nonspecific focus of increased FDG activity in left/mid apical region? neoplasm.  Indeterminate FDG-avid focus in right inferolateral aspect of the body of sternum, without corresponding abnormality on CT. No lytic lesion.  9/24/24- MRI H wo-Subacute infarct in the right posterior cerebral artery vascular territory with hemorrhagic conversion appears grossly unchanged from CT.    #smoldering MM  #Bladder ca   -patient presenting to [facility] s/p fall. recent hx of Occipital infarct at [facility] on 8/24/24, d/c w/ Plavix s/p fall at home presenting to [facility] w/ hemorrhagic conversion.  -as per daughter Samina patient follows w/ Dr. Matias noted  to have smoldering MM, Pet scan seen from 5/24, not on tx. Samina also noted patient w/ hx of superficial bladder ca being tx by urologist Dr. Rivera s/p 6 rounds of BCG tx  planned for cystoscopy OP.     RECS  -patient should follow back up w/ established oncologist Dr. Matias on d/c   -would defer to neurology recs for choice of anti-plt therapy and further stroke mgmt             *Note not finalized until signed by Attending Physician    Thank you for the referral.  Please call with any questions [phone]  Above reviewed with Attending Dr. Monroe     [facility]  [address] [place] [address]  [phone]     incomplete     83 yo M with a PMHx of recent occipital infarct on 8/24/24 at another institution d/c'd home on plavix for one week, DM, HTN, HLD, GERD, who presents following a fall. CT showing hemorrhagic conversion.    5/21/24- PET/CT-Few small FDG-avid mediastinal and bilateral hilar lymph nodes are nonspecific. A benign etiology is favored. Cluster of tiny nodules and peripheral right middle lobe may represent mucoid impacted distal airways. Enlarged prostate gland with nonspecific focus of increased FDG activity in left/mid apical region? neoplasm.  Indeterminate FDG-avid focus in right inferolateral aspect of the body of sternum, without corresponding abnormality on CT. No lytic lesion.  9/24/24- MRI H wo-Subacute infarct in the right posterior cerebral artery vascular territory with hemorrhagic conversion appears grossly unchanged from CT.    #smoldering MM  #Bladder ca   -patient presenting to [facility] s/p fall. recent hx of Occipital infarct at [facility] on 8/24/24, d/c w/ Plavix s/p fall at home presenting to [facility] w/ hemorrhagic conversion.  -as per daughter Samina patient was following w/ Dr. Matias noted  to have smoldering MM, Pet scan seen from 5/24 but had not followed up-, not on tx. Samina also noted patient w/ hx of superficial bladder ca being tx by urologist Dr. Rivera s/p 6 rounds of BCG tx  planned for cystoscopy OP.     RECS  -patient should follow back up w/ established oncologist Dr. Matias on d/c   -would defer to neurology recs for choice of anti-plt therapy and further stroke mgmt             *Note not finalized until signed by Attending Physician    Thank you for the referral.  Please call with any questions [phone]  Above reviewed with Attending Dr. Monroe     [facility]  [facility] [place] [address]  [phone]     incomplete     83 yo M with a PMHx of recent occipital infarct on 8/24/24 at another institution d/c'd home on plavix for one week, DM, HTN, HLD, GERD, who presents following a fall. CT showing hemorrhagic conversion.    5/21/24- PET/CT-Few small FDG-avid mediastinal and bilateral hilar lymph nodes are nonspecific. A benign etiology is favored. Cluster of tiny nodules and peripheral right middle lobe may represent mucoid impacted distal airways. Enlarged prostate gland with nonspecific focus of increased FDG activity in left/mid apical region? neoplasm.  Indeterminate FDG-avid focus in right inferolateral aspect of the body of sternum, without corresponding abnormality on CT. No lytic lesion.  9/24/24- MRI H wo-Subacute infarct in the right posterior cerebral artery vascular territory with hemorrhagic conversion appears grossly unchanged from CT.    #smoldering MM  #Bladder ca   -patient presenting to [facility] s/p fall. recent hx of Occipital infarct at [facility] on 8/24/24, d/c w/ Plavix s/p fall at home presenting to [facility] w/ hemorrhagic conversion.  -as per daughter Samina patient was following w/ Dr. Matias noted  to have smoldering MM, Pet scan seen from 5/24 but had not followed up since may 2024-, Not on tx. Samina also noted patient w/ hx of superficial bladder ca being tx by urologist Dr. Rivera s/p 6 rounds of BCG tx  planned for cystoscopy OP.     RECS  -patient should follow back up w/ established oncologist Dr. Matias on d/c   -would defer to neurology recs for choice of anti-plt therapy and further stroke mgmt             *Note not finalized until signed by Attending Physician    Thank you for the referral.  Please call with any questions [phone]  Above reviewed with Attending Dr. Monroe     [facility]  [address] [place] [address]  [phone]    85 yo M with a PMHx of recent occipital infarct on 8/24/24 at another institution d/c'd home recently started on plavix for one week, DM, HTN, HLD, GERD, who presents following a fall. CT showing hemorrhagic conversion.    5/21/24- PET/CT-Few small FDG-avid mediastinal and bilateral hilar lymph nodes are nonspecific. A benign etiology is favored. Cluster of tiny nodules and peripheral right middle lobe may represent mucoid impacted distal airways. Enlarged prostate gland with nonspecific focus of increased FDG activity in left/mid apical region? neoplasm.  Indeterminate FDG-avid focus in right inferolateral aspect of the body of sternum, without corresponding abnormality on CT. No lytic lesion.  9/24/24- MRI H wo-Subacute infarct in the right posterior cerebral artery vascular territory with hemorrhagic conversion appears grossly unchanged from CT.    #smoldering MM  #Bladder ca   -patient presenting to [facility] s/p fall. recent hx of Occipital infarct at [facility] on 8/24/24, d/c w/ Plavix s/p fall at home presenting to [facility] w/ hemorrhagic conversion.  -as per daughter Samina patient was following w/ Dr. Matias noted  to have smoldering MM, Pet scan seen from 5/24 but had not followed up since may 2024-, Not on tx. Samina also noted patient w/ hx of superficial bladder ca being tx by urologist Dr. Rivera s/p 6 rounds of BCG tx  planned for cystoscopy OP.     RECS  -patient should follow back up w/ established oncologist Dr. Matias on d/c   -would defer to neurology recs for choice of anti-plt therapy and further stroke mgmt             *Note not finalized until signed by Attending Physician    Thank you for the referral.  Please call with any questions [phone]  Above reviewed with Attending Dr. Monroe     [facility]  [address] [place] [address]  [phone]    85 yo M with a PMHx of recent occipital infarct on 8/24/24 at another institution d/c'd home recently started on plavix for one week, DM, HTN, HLD, GERD, who presents following a fall. CT showing hemorrhagic conversion.    5/21/24- PET/CT-Few small FDG-avid mediastinal and bilateral hilar lymph nodes are nonspecific. A benign etiology is favored. Cluster of tiny nodules and peripheral right middle lobe may represent mucoid impacted distal airways. Enlarged prostate gland with nonspecific focus of increased FDG activity in left/mid apical region? neoplasm.  Indeterminate FDG-avid focus in right inferolateral aspect of the body of sternum, without corresponding abnormality on CT. No lytic lesion.  9/24/24- MRI H wo-Subacute infarct in the right posterior cerebral artery vascular territory with hemorrhagic conversion appears grossly unchanged from CT.    #smoldering MM  #Bladder ca   -patient presenting to [facility] s/p fall. recent hx of Occipital infarct at [facility] on 8/24/24, d/c w/ Plavix s/p fall at home presenting to [facility] w/ hemorrhagic conversion.  -as per daughter Samina patient was following w/ Dr. Matias noted  to have smoldering MM, Pet scan seen from 5/24 but had not followed up since may 2024-, Not on tx. Samina also noted patient w/ hx of superficial bladder ca being tx by urologist Dr. Rivera s/p 6 rounds of BCG tx  planned for cystoscopy OP.     RECS  -patient should follow back up w/ established oncologist Dr. Matias on d/c   -would defer to neurology recs for choice of anti-plt therapy and further stroke mgmt             *Note not finalized until signed by Attending Physician    Thank you for the referral. Will sign off for now, Please reconsult if needed, Thank You   Please call with any questions [phone]  Above reviewed with Attending Dr. Monroe     [facility] [place] [address] [place] [address]  [phone]    83 yo M with a PMHx of recent occipital infarct on 8/24/24 at another institution d/c'd home recently started on plavix for one week, DM, HTN, HLD, GERD, who presents following a fall. CT showing hemorrhagic conversion.    5/21/24- PET/CT-Few small FDG-avid mediastinal and bilateral hilar lymph nodes are nonspecific. A benign etiology is favored. Cluster of tiny nodules and peripheral right middle lobe may represent mucoid impacted distal airways. Enlarged prostate gland with nonspecific focus of increased FDG activity in left/mid apical region? neoplasm.  Indeterminate FDG-avid focus in right inferolateral aspect of the body of sternum, without corresponding abnormality on CT. No lytic lesion.  9/24/24- MRI H wo-Subacute infarct in the right posterior cerebral artery vascular territory with hemorrhagic conversion appears grossly unchanged from CT.    #smoldering MM  #Bladder ca   -patient presenting to [facility] s/p fall. recent hx of Occipital infarct at [facility] on 8/24/24, d/c w/ Plavix s/p fall at home presenting to [facility] w/ hemorrhagic conversion.  -as per daughter Samina patient was following w/ Dr. Matias noted  to have smoldering MM, Pet scan seen from 5/24 but had not followed up since may 2024-, Not on tx. Samina also noted patient w/ hx of superficial bladder ca being tx by urologist Dr. Rivera s/p 6 rounds of BCG tx  planned for cystoscopy OP.     RECS  -patient should follow back up w/ established oncologist Dr. Matias on d/c   -would defer to neurology recs for choice of anti-plt therapy and further stroke mgmt                 Thank you for the referral. Will sign off for now, Please reconsult if needed, Thank You   Please call with any questions [phone]  Above reviewed with Attending Dr. Monroe     [facility] [place] [address] [place] [address]  [phone]

## 2024-09-26 NOTE — DIETITIAN INITIAL EVALUATION ADULT - ADD RECOMMEND
Glucerna shake BID to optimize po intake and provide an additional 220 kcal, 10g protein per serving   Continue folic acid  Rec MVI

## 2024-09-26 NOTE — CONSULT NOTE ADULT - SUBJECTIVE AND OBJECTIVE BOX
HPI:  Mr. 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  He reports going to the bathroom, feeling wobbly and then falling.  He reports he occasionally experiences similar wobbling sxs.  He endorses headstrike and has a linear abrasion across his forehead with a small hematoma.  He denies LOC.  The patients daughter reports hearing about the fall from his nursing home this morning and then calling the ambulance after consulting with the Pt's neurologist.  He is AOx1 with L visual deficits which the daughter reports is his baseline since his stroke in August.  He denies pain anywhere except mild soreness in his R trapezius.  (23 Sep 2024 21:53)    Incomplete  HPI:  85 yo male with a history of HTN, HLD, DM, GERD, multiple myeloma, occipital infarct on 8/24/24, on plavix x 1 week, transferred from [facility] to [facility] s/p fall.  Per notes, patient was at rehab following stroke in August, was supposed to be discharged soon, however fell in the bathroom around 3 am on the day of presentation, feeling wobbly and then falling. + Head strike, no LOC.   Per notes, daughter called EMS on the morning of the fall (in consultation w/patient's neurologist), had him transferred to [facility] and patient was subsequently transferred to [facility].  Noted with a linear abrasion across his forehead with a small hematoma.  Since stroke late August, patient is a x o x 1 with left visual deficits.  Patient w/mild soreness in his right trapezius on presentation, otherwise, no pain.  CT brain (stroke protocol) revealed: subacute right PCA infarct with hemorrhagic conversion.  CRISTO also noted on admission.  Admitted to the SICU for further management.      Repeat CT head stable, downgraded to medicine.  A goals of care conversation was held with patient's daughter, and full code orders were maintained.  Palliative care consulted for goals of care.            PERTINENT PMH REVIEWED: Yes No    PAST MEDICAL & SURGICAL HISTORY:  Hypothyroid      HLD (hyperlipidemia)      HTN (hypertension)      Gastroesophageal reflux disease, esophagitis presence not specified      Type 2 diabetes mellitus  on metformin      Cerebrovascular accident (CVA)      H/O arthroscopy of right knee          SOCIAL HISTORY:                                     Admitted from:  home  SNF  [facility]     Surrogate/HCP/Guardian: Phone#:    FAMILY HISTORY:      Baseline ADLs (prior to admission):  Independent/ Dependent      Allergies    No Known Allergies    Intolerances        Present Symptoms:     Dyspnea: 0 1 2 3   Nausea/Vomiting: Yes No  Anxiety:  Yes No  Depression: Yes No  Fatigue: Yes No  Loss of appetite: Yes No    Pain:             Character-            Duration-            Effect-            Factors-            Frequency-            Location-            Severity-    Review of Systems: Reviewed                     Negative:                     Positive:  Unable to obtain due to poor mentation   All others negative    MEDICATIONS  (STANDING):  atorvastatin 40 milliGRAM(s) Oral at bedtime  chlorhexidine 2% Cloths 1 Application(s) Topical <User Schedule>  folic acid 1 milliGRAM(s) Oral daily  insulin lispro (ADMELOG) corrective regimen sliding scale   SubCutaneous Before meals and at bedtime  levothyroxine 75 MICROGram(s) Oral daily  metoprolol succinate ER 25 milliGRAM(s) Oral daily  pantoprazole    Tablet 40 milliGRAM(s) Oral before breakfast  senna 2 Tablet(s) Oral at bedtime  tamsulosin 0.4 milliGRAM(s) Oral at bedtime    MEDICATIONS  (PRN):  acetaminophen     Tablet .. 650 milliGRAM(s) Oral every 6 hours PRN Mild Pain (1 - 3), Moderate Pain (4 - 6), Severe Pain (7 - 10)  melatonin 3 milliGRAM(s) Oral at bedtime PRN Insomnia  OLANZapine Injectable 2.5 milliGRAM(s) IntraMuscular every 8 hours PRN agitation      PHYSICAL EXAM:    Vital Signs Last 24 Hrs  T(C): 36.1 (26 Sep 2024 05:00), Max: 37.1 (25 Sep 2024 11:37)  T(F): 96.9 (26 Sep 2024 05:00), Max: 98.7 (25 Sep 2024 11:37)  HR: 93 (26 Sep 2024 05:00) (59 - 93)  BP: 119/72 (26 Sep 2024 05:00) (112/62 - 145/75)  BP(mean): --  RR: 18 (26 Sep 2024 05:00) (18 - 18)  SpO2: 96% (26 Sep 2024 05:00) (96% - 99%)    Parameters below as of 26 Sep 2024 05:00  Patient On (Oxygen Delivery Method): room air        General: alert  oriented x ____ lethargic agitated                  cachexia  nonverbal  coma    Karnofsky:  %    HEENT: normal  dry mouth  ET tube/trach    Lungs: comfortable tachypnea/labored breathing  excessive secretions    CV: normal  tachycardia    GI: normal  distended  tender  no BS               PEG/NG/OG tube  constipation  last BM:     : normal  incontinent  oliguria/anuria  vasquez    MSK: normal  weakness  edema             ambulatory  bedbound/wheelchair bound    Skin: normal  pressure ulcers- Stage_____  no rash    LABS:                        10.6   2.16  )-----------( 151      ( 25 Sep 2024 05:59 )             33.3     09-25    140  |  107  |  30.4[H]  ----------------------------<  94  3.8   |  20.0[L]  |  1.71[H]    Ca    9.0      25 Sep 2024 05:59  Phos  3.3     09-25  Mg     1.9     09-25        Urinalysis Basic - ( 25 Sep 2024 05:59 )    Color: x / Appearance: x / SG: x / pH: x  Gluc: 94 mg/dL / Ketone: x  / Bili: x / Urobili: x   Blood: x / Protein: x / Nitrite: x   Leuk Esterase: x / RBC: x / WBC x   Sq Epi: x / Non Sq Epi: x / Bacteria: x      I&O's Summary      RADIOLOGY & ADDITIONAL STUDIES:    ADVANCE DIRECTIVES:   DNR YES NO  Completed on:                     MOLST  YES NO   Completed on:  Living Will  YES NO   Completed on:     HPI:  Mr. 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  He reports going to the bathroom, feeling wobbly and then falling.  He reports he occasionally experiences similar wobbling sxs.  He endorses headstrike and has a linear abrasion across his forehead with a small hematoma.  He denies LOC.  The patients daughter reports hearing about the fall from his nursing home this morning and then calling the ambulance after consulting with the Pt's neurologist.  He is AOx1 with L visual deficits which the daughter reports is his baseline since his stroke in August.  He denies pain anywhere except mild soreness in his R trapezius.  (23 Sep 2024 21:53)    Incomplete  HPI:  83 yo male with a history of HTN, HLD, DM, GERD, multiple myeloma, bladder cancer occipital infarct on 8/24/24, on plavix x 1 week, transferred from [facility] to [facility] s/p fall.  Per notes, patient was at rehab following stroke in August, was supposed to be discharged soon, however fell in the bathroom around 3 am on the day of presentation, feeling wobbly and then falling. + Head strike, no LOC.   Per notes, daughter called EMS on the morning of the fall (in consultation w/patient's neurologist), had him transferred to [facility] and patient was subsequently transferred to [facility].  Noted with a linear abrasion across his forehead with a small hematoma.  Since stroke late August, patient is a x o x 1 with left visual deficits.  Patient w/mild soreness in his right trapezius on presentation, otherwise, no pain.  CT brain (stroke protocol) revealed: subacute right PCA infarct with hemorrhagic conversion.  CRISTO also noted on admission.  Admitted to the SICU for further management.      Repeat CT head stable, downgraded to medicine.  A goals of care conversation was held with patient's daughter, and full code orders were maintained.  Palliative care consulted for goals of care.    Patient seen and examined at the bedside at 11 am.  Limited information obtained from patient.  Patient resting on time of arrival to room.  Responds to verbal stimuli, but does not open eyes.  When asked why he is here, patient states that he is not sure.  Endorses fatigue.  Denies pain, shortness of breath.  Affirms that he has a daughter.   Unable to obtain further information from the patient.      PERTINENT PMH REVIEWED: Yes    PAST MEDICAL & SURGICAL HISTORY:  Hypothyroid      HLD (hyperlipidemia)      HTN (hypertension)      Gastroesophageal reflux disease, esophagitis presence not specified      Type 2 diabetes mellitus  on metformin      Cerebrovascular accident (CVA)      H/O arthroscopy of right knee          SOCIAL HISTORY:  transfer from [facility]                                       Surrogate/HCP/Guardian: Dionne Howard Phone#: [phone]    FAMILY HISTORY:  unable to obtain       Baseline ADLs (prior to admission): from rehab prior to transfer from [facility]: recent CVA, assist with IADLs, minimally (est).  Independent/ Dependent      Allergies    No Known Allergies    Intolerances        Present Symptoms: limited by response    Dyspnea: denies  Nausea/Vomiting: unable to obtain  Anxiety:  unable to obtain  Depression:  unable to obtain  Fatigue:  unable to obtain  Loss of appetite: Yes No    Pain: denies    Review of Systems: Reviewed, limited to above, given patient response      MEDICATIONS  (STANDING):  atorvastatin 40 milliGRAM(s) Oral at bedtime  chlorhexidine 2% Cloths 1 Application(s) Topical <User Schedule>  folic acid 1 milliGRAM(s) Oral daily  insulin lispro (ADMELOG) corrective regimen sliding scale   SubCutaneous Before meals and at bedtime  levothyroxine 75 MICROGram(s) Oral daily  metoprolol succinate ER 25 milliGRAM(s) Oral daily  pantoprazole    Tablet 40 milliGRAM(s) Oral before breakfast  senna 2 Tablet(s) Oral at bedtime  tamsulosin 0.4 milliGRAM(s) Oral at bedtime    MEDICATIONS  (PRN):  acetaminophen     Tablet .. 650 milliGRAM(s) Oral every 6 hours PRN Mild Pain (1 - 3), Moderate Pain (4 - 6), Severe Pain (7 - 10)  melatonin 3 milliGRAM(s) Oral at bedtime PRN Insomnia  OLANZapine Injectable 2.5 milliGRAM(s) IntraMuscular every 8 hours PRN agitation      PHYSICAL EXAM:    Vital Signs Last 24 Hrs  T(C): 36.1 (26 Sep 2024 05:00), Max: 37.1 (25 Sep 2024 11:37)  T(F): 96.9 (26 Sep 2024 05:00), Max: 98.7 (25 Sep 2024 11:37)  HR: 93 (26 Sep 2024 05:00) (59 - 93)  BP: 119/72 (26 Sep 2024 05:00) (112/62 - 145/75)  BP(mean): --  RR: 18 (26 Sep 2024 05:00) (18 - 18)  SpO2: 96% (26 Sep 2024 05:00) (96% - 99%)    Parameters below as of 26 Sep 2024 05:00  Patient On (Oxygen Delivery Method): room air    Karnofsky: 30 %  Gen: Frail in appearance, in NAD.  No pain behaviors or work of breathing noted  Neuro:lethargic, responses to few, simple questions intermittently,   Head:  bitemporal wasting  Eyes: does not cooperate with eye exam  ENT: no shiv lip ulcerations  Resp: unlabored  CV: S1, S2  Abd: soft + bowels sounds  Peripheral Vasc: warm, no pedal edema  Int: warm and dry  Psych: no psychomotor agitation      LABS:                        10.6   2.16  )-----------( 151      ( 25 Sep 2024 05:59 )             33.3     09-25    140  |  107  |  30.4[H]  ----------------------------<  94  3.8   |  20.0[L]  |  1.71[H]    Ca    9.0      25 Sep 2024 05:59  Phos  3.3     09-25  Mg     1.9     09-25        Urinalysis Basic - ( 25 Sep 2024 05:59 )    Color: x / Appearance: x / SG: x / pH: x  Gluc: 94 mg/dL / Ketone: x  / Bili: x / Urobili: x   Blood: x / Protein: x / Nitrite: x   Leuk Esterase: x / RBC: x / WBC x   Sq Epi: x / Non Sq Epi: x / Bacteria: x      I&O's Summary      RADIOLOGY & ADDITIONAL STUDIES: reviewed    ADVANCE DIRECTIVES:   full code orders  daughter, Dionne Howard     HPI:  Mr. 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  He reports going to the bathroom, feeling wobbly and then falling.  He reports he occasionally experiences similar wobbling sxs.  He endorses headstrike and has a linear abrasion across his forehead with a small hematoma.  He denies LOC.  The patients daughter reports hearing about the fall from his nursing home this morning and then calling the ambulance after consulting with the Pt's neurologist.  He is AOx1 with L visual deficits which the daughter reports is his baseline since his stroke in August.  He denies pain anywhere except mild soreness in his R trapezius.  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  Per notes, patient was at rehab following stroke in August, was supposed to be discharged on the day he presented to [facility], however fell in the bathroom around 3 am, feeling wobbly and then falling. + Head strike, no LOC.  Per notes, daughter called EMS on the morning of the fall (in consultation w/patient's neurologist), had him transferred to [facility] and patient was subsequently transferred to [facility].  Noted with a linear abrasion across his forehead with a small hematoma.  Since stroke late August, patient is a x o x 1 with left visual deficits.  Patient w/mild soreness in his right trapezius on presentation, otherwise, no pain.  CT brain (stroke protocol) revealed: subacute right PCA infarct with hemorrhagic conversion.  CRISTO also noted on admission.  Admitted to the SICU for further management.      Repeat CT head stable, downgraded to medicine.  A goals of care conversation was held with patient's daughter, and full code orders were maintained.  Neurology following in setting of CVA, rec holding antiplatelets for at LEAST 2 weeks, obtain f/u CT Head prior to resumption.  Seen by heme-onc in the setting of MM, bladder cancer.   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 Pet scan done 5/2024, no follow up since then.  Followed by urologist Dr. Rivera for bladder cancer, s/p 6 rounds of BCG tx with plan for outpatient cystoscopy.  Agitation noted on this admission.  Palliative care consulted for goals of care.    Patient seen and examined at the bedside at 11 am.  Limited information obtained from patient.  Patient resting on time of arrival to room.  Responds to verbal stimuli, but does not open eyes.  When asked why he is here, patient states that he is not sure.  Endorses fatigue.  Denies pain, shortness of breath.  Affirms that he has a daughter.   Unable to obtain further information from the patient.      PERTINENT PMH REVIEWED: Yes    PAST MEDICAL & SURGICAL HISTORY:  Hypothyroid      HLD (hyperlipidemia)      HTN (hypertension)      Gastroesophageal reflux disease, esophagitis presence not specified      Type 2 diabetes mellitus  on metformin      Cerebrovascular accident (CVA)      H/O arthroscopy of right knee      SOCIAL HISTORY:  transfer from [facility]                                       Surrogate/HCP/Guardian: Dionne Howard Phone#: [phone]    FAMILY HISTORY:  unable to obtain       Baseline ADLs (prior to admission): from rehab prior to transfer from [facility]: recent CVA, assist with IADLs, minimally (est).    Allergies    No Known Allergies    Intolerances        Present Symptoms: limited by response    Dyspnea: denies  Nausea/Vomiting: unable to obtain  Anxiety:  unable to obtain  Depression:  unable to obtain  Fatigue:  unable to obtain  Loss of appetite: unable to obtain    Pain: denies    Review of Systems: Reviewed, limited to above, given patient response      MEDICATIONS  (STANDING):  atorvastatin 40 milliGRAM(s) Oral at bedtime  chlorhexidine 2% Cloths 1 Application(s) Topical <User Schedule>  folic acid 1 milliGRAM(s) Oral daily  insulin lispro (ADMELOG) corrective regimen sliding scale   SubCutaneous Before meals and at bedtime  levothyroxine 75 MICROGram(s) Oral daily  metoprolol succinate ER 25 milliGRAM(s) Oral daily  pantoprazole    Tablet 40 milliGRAM(s) Oral before breakfast  senna 2 Tablet(s) Oral at bedtime  tamsulosin 0.4 milliGRAM(s) Oral at bedtime    MEDICATIONS  (PRN):  acetaminophen     Tablet .. 650 milliGRAM(s) Oral every 6 hours PRN Mild Pain (1 - 3), Moderate Pain (4 - 6), Severe Pain (7 - 10)  melatonin 3 milliGRAM(s) Oral at bedtime PRN Insomnia  OLANZapine Injectable 2.5 milliGRAM(s) IntraMuscular every 8 hours PRN agitation      PHYSICAL EXAM:    Vital Signs Last 24 Hrs  T(C): 36.1 (26 Sep 2024 05:00), Max: 37.1 (25 Sep 2024 11:37)  T(F): 96.9 (26 Sep 2024 05:00), Max: 98.7 (25 Sep 2024 11:37)  HR: 93 (26 Sep 2024 05:00) (59 - 93)  BP: 119/72 (26 Sep 2024 05:00) (112/62 - 145/75)  BP(mean): --  RR: 18 (26 Sep 2024 05:00) (18 - 18)  SpO2: 96% (26 Sep 2024 05:00) (96% - 99%)    Parameters below as of 26 Sep 2024 05:00  Patient On (Oxygen Delivery Method): room air    Karnofsky: 30 %  Gen: Frail in appearance, in NAD.  No pain behaviors or work of breathing noted  Neuro:lethargic, responses to few, simple questions intermittently,   Head:  bitemporal wasting  Eyes: does not cooperate with eye exam  ENT: no shiv lip ulcerations  Resp: unlabored  CV: S1, S2  Abd: soft + bowels sounds  Peripheral Vasc: warm, no pedal edema  Int: warm and dry  Psych: no psychomotor agitation      LABS:                        10.6   2.16  )-----------( 151      ( 25 Sep 2024 05:59 )             33.3     09-25    140  |  107  |  30.4[H]  ----------------------------<  94  3.8   |  20.0[L]  |  1.71[H]    Ca    9.0      25 Sep 2024 05:59  Phos  3.3     09-25  Mg     1.9     09-25        Urinalysis Basic - ( 25 Sep 2024 05:59 )    Color: x / Appearance: x / SG: x / pH: x  Gluc: 94 mg/dL / Ketone: x  / Bili: x / Urobili: x   Blood: x / Protein: x / Nitrite: x   Leuk Esterase: x / RBC: x / WBC x   Sq Epi: x / Non Sq Epi: x / Bacteria: x      I&O's Summary      RADIOLOGY & ADDITIONAL STUDIES: reviewed    ADVANCE DIRECTIVES:   full code orders  daughter, Dionne Howard     Patient/Caregiver provided printed discharge information.

## 2024-09-26 NOTE — PHYSICAL THERAPY INITIAL EVALUATION ADULT - ADDITIONAL COMMENTS
pts daughter lives in home next door however she works overnights. per daughter pt was planning to have [facility]HA assist upon d/c from [facility]. pt lives in 2 family house on [address]. [address] home then 14 steps to apartment with HR. no DME use

## 2024-09-26 NOTE — DIETITIAN INITIAL EVALUATION ADULT - PHYSICAL ASSESSMENT TEMPLES
Physical Therapy Evaluation    Visit Count: 1     Plan of Care: 2022 Through: 2022  Insurance Information: Payor: [facility]  Authorization Needed: NO  Maximum Visit Limit Per Year: 90 visits comb PT/OT/ST per  year  CoPay: $0  Referred by: Mandy Woodson*; Next provider visit (if known/scheduled): 22  Medical Diagnosis (from order):   Diagnosis Information      Diagnosis    659.63 (ICD-9-CM) - O09.522 (ICD-10-CM) - Multigravida of advanced maternal age in second trimester    V23.89 (ICD-9-CM) - O09.899 (ICD-10-CM) - Supervision of other high risk pregnancy, antepartum    618.89 (ICD-9-CM) - N81.89 (ICD-10-CM) - Pelvic floor weakness              Treatment Diagnosis: Pelvic floor dysfunction and stress urinary incontinence with impaired posture, impaired range of motion, impaired motor function/performance/coordination, impaired bladder health    Date of onset/injury: Since birth of her first child 13 months ago  Diagnosis Precautions: 24 weeks pregnant  Chart reviewed at time of initial evaluation (relevant co-morbidities, allergies, tests and medications listed):   Patient Active Problem List   Diagnosis   • Supervision of other high risk pregnancy, antepartum   • AMA (advanced maternal age) multigravida 35+     Current Outpatient Medications   Medication Sig   • aspirin 81 MG chewable tablet Chew 81 mg by mouth daily.   • Prenat w/o [id number] (PNV-DHA PO)      No current facility-administered medications for this visit.         SUBJECTIVE   Patient is 24 weeks pregnant. She also has a 13 month old.   States that her pelvic floor feels different than before her delivery.   Patient works as a .   Patient does a 30 minute workout twice per week. She also does stretching.   Patient stopped breast-feeding 4 months ago.    OB History    Para Term  AB Living   3 1 1 0 1 1   SAB IAB Ectopic Molar Multiple Live Births   0 1 0 0 0 1   This is patient's 3rd pregnancy,  one .  Delivery Type: vaginal: 1    Delivery Description: Patient states that she tore during her delivery.     Medical/Surgical History:   • History of sexual abuse or trauma: No  • History of urinary tract infection: No  • Surgery: no     Bladder Symptoms    Checked symptoms present  Comments   [x] Urinary Incontinence Activities causing incontinence: sneezing, jumping up and down. This started after her last delivery.    Frequency of incontinence: Every other week.   Volume of leaking: A spurt  Protection used: None   [] Urinary urgency Triggers for urgency:    [] Urinary frequency (norm= <10/day or every 2-5 hours) 6 times per day   [x] Night time urination (nocturia) Once per night, but it depends when she stops drinking water that night   [] Sensation of incomplete bladder emptying    [] Difficulty initiating stream    [] Stuttering stream    [] Pain    [] Hematuria    [] Urine odor    [] Straining to empty    [] Nocturnal Enuresis        Bowel Symptoms:  Patient denies any bowel dysfunction.  Frequency of bowel movements: 1 times per day     Pain:   Patient is reporting some hip pain, but she got a pregnancy pillow that has been helpful for her. Had back pain with her first pregnancy, but not with this one. Patient does not report feeling weak overall, she just feels like her pelvic floor is different. There have been instances where there has been an achy feeling in her pelvic floor if she did a quick movement, but this has happened twice in the last 6 months.     Dietary Habits:  Fluid consumption: 64 ounces of water per day. 1 can of diet coke per day.      Function:   Limitations/exacerbating factors: above described symptoms causing stress urinary incontinence with sneezing and jumping  Prior level: no urinary leakage  Patient Goals: \"Find out if my pelvic floor is in a good place, know what I can do to stay there, stop leakage.\"    Prior Treatment: no therapies in the past year for current  condition. Hospitalization, home health services or skilled nursing facility in the last 30 days: No, per patient.  Home Environment/Social Support: Patient lives with significant other, with children.  Patient has assistance as needed from family/friends.    Safety:  Do you feel safe at home, work and/or school? yes, per patient  Patient denies 2 or more falls or an unexplained fall with injury in the last year, further testing not required     OBJECTIVE       Pelvic Girdle Special Tests  Sacroiliac (SI) Joint Testing:LeftRightPosterior pelvic pain provocation test (P4 or Thigh Thrust test)    Sacroiliac Joint Distraction    Sacroiliac Joint Compression    Sacral Thrust Test    LDL Long Dorsal Ligament Palpation    Active Straight Leg Raise    Pubic Symphysis PalpationNegative Negative   March TestNegative Negative   Stork Test     Standing Forward Bend Test    Supine to long sit    Positive test indicated with an \"X\"; Three positive provocation tests indicates likely SI dysfunction. Emani 2005. Bri 2009    Transverse Abdominal Strength:  Modified Sahrmann Lower Abdominal testing:   Testing performed in hook lying, instructed to not allow low back to arch or flatten out of neutral lumbar position during task to achieve grade.  Highest level able to perform with good technique is checked below:  [] Fire TrA   [] Level 1: hip flexion and alternate foot lifted  [] Level 2: one hip flexed to 90, alternate heel slide on floor  [x] Level 3: one hip flexed to 90, alternate heel slide off floor  [] Level 4: bilateral heel slide on floor  [] Level 5: double-leg lowering    Lumbar Range of Motion (%)  Date  2/9/22   Flexion 100%   Extension 75%   Lateral Flexion Left 100%   Lateral Flexion Right 100%   Rotation Left  50%   Rotation Right  50%   standard testing positions unless otherwise noted; ranges are reported in active range of motion unless noted AA=active assistive range of motion and P=passive range of motion; *  =pain  ROM limited with motions due to pregnancy.     Strength: (out of 5)   Left Right   Date 2/9/22 2/9/22   Hip Flexors 5 5   Hip Extensors     Hip Abductors 5 5   Hip Adductors     Hip Internal Rotators     Hip External Rotators 5 5   Knee Flexors     Knee Extensors     Ankle Dorsiflexors     Ankle Plantar Flexors     Ankle Invertors     Ankle Evertors     standard testing positions unless otherwise noted; *=pain  Only muscle strength that was assessed are noted.      Outcome Measures:   Patient-Specific Activity Scoring Scheme  Provide up to 3 activities you are currently having difficulty completing and want to improve with therapy:  Activity Score   Date 2/9/2022   1. Sneezing 7   2. Jumping 7   3. NA NA   Average Score 7   Each activity is scored: 0=unable to perform activity to 10=able to perform activity at the same level as before injury to problem  Total score = sum of the activity scores/number of activities  Minimum detectable change (90%CI) for average score = 2 points  Minimum detectable change (90%CI) for single activity score = 3 points      Initial Treatment   Initial evaluation completed.    Therapeutic Activity:  Patient educated on anatomy and physiology of the pelvic floor, along with its functions. Educated patient on importance of range of motion of pelvic floor to aid in decreasing urinary leakage.   Discussed with patient messaging her referring physician regarding performing an internal assessment. Message sent to physician. Educated patient on benefits of performing an internal examination to assess for pelvic floor strength and ROM.    Skilled input: as detailed above    Initial Home Program:  * above=instructed home program    Not yet established    Writer verbally educated the patient and received verbal consent from the patient on hand placement, positioning of patient, and techniques to be performed today including clothing adjustments for techniques, therapist position for  techniques, hand placement and palpation for techniques as described above and how they are pertinent to the patient's plan of care.     Suggestions for next session as indicated: progress per plan of care, internal assessment and establish HEP    ASSESSMENT   37 year old female patient has signs and symptoms consistent with stress urinary incontinence with sneezing and jumping that has reported functional limitations listed above. Patient is currently 24 weeks pregnant. She began having symptoms of leakage following the birth of her first child, and it has continued into this pregnancy. Internal assessment not performed today due to needing to get clearance from patient's MD. Message sent to MD, and MD gave permission to do internal assessment. Will be completed at next therapy session. Patient educated on importance of range of motion of pelvic floor to aid in both decreasing incontinence and birthing a baby.     Patient will benefit from skilled therapy and Rehabilitative potential is very good based on assessment above  Predicted patient presentation: Low (stable) - Patient comorbidities and complexities, as defined above, will have little effect on progress for prescribed plan of care.    PLAN   Goals: To be obtained by end of this plan of care:  1. Patient independent with modified and progressed home exercise program.  2. Improve involved range of motion to full ROM pelvic floor   3. Display at least 4/5 strength pelvic floor   through improvements listed above patient will:  4. Sneeze, jump with 75% reduced incontinence in order to exercise, complete ADLs   5. Progress through pregnancy with no reports of back, hip, or groin pain  6. Patient Specific Functional Scale (PSFS) will improve an average score of 7/10 to 9 (minimal detectable change (90%CI) for average score is 2 points, for single activity score is 3 points)    The following skilled interventions to be implemented to achieve above:  Activities of  Daily Living/Self Care (03213)  Manual Therapy (62492)  Neuromuscular Re-Education (32622)  Therapeutic Activity (04698)  Therapeutic Exercise (84951)    Frequency/Duration: 1 times per week for 16 weeks with tapering as the patient progresses for an estimated total of 16 visits    patient involved in and agreed to plan of care and goals.   Attendance policy provided at time of evaluation.     Patient Education:   Who will be receiving education: patient  Are they ready to learn: yes  Preferred learning style: written, verbal, demonstration  Barriers to learning: no barriers apparent at this time   Result of initial outlined education: Verbalizes understanding    Therapy procedure time and total treatment time can be found documented on the Time Entry flowsheet   severe

## 2024-09-26 NOTE — CONSULT NOTE ADULT - PROBLEM SELECTOR RECOMMENDATION 9
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   - No COURTNEY performed at [facility].   - Unable to keep MCOT monitor on or charged.   - Echocardiogram with EF 60%, mod AI, basal inferolateral segment was dyskinetic.   - Plan for COURTNEY tomorrow.   - NPO after midnight.   - If no causes of CVA noted, will plan for ILR tomorrow.   - Restart plavix when cleared by Neurosurgery/Neurology.   - Outpatient f/u with primary Cardiologist for RWMA on echocardiogram.   - Continue lipitor and Toprol XL.

## 2024-09-26 NOTE — PROGRESS NOTE ADULT - ASSESSMENT
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 Admitted for hemorrhagic conversion of R PCA. Physical exam shows a 2 inch lateral cut on forehead.    # ams / likely Delirium due to cva   - received 5mg olanzapine yesterday for agitation (11AM, 7PM 2.5mg each)  - watch for further delirium as pending dispo    # afib workup # stroke workup  - daughter would like input of Dr. Ariza (PCP/Cardiologist)  - Spoke w Dr. Ariza yesterday; recommends ILR  - will let neuro & daughter know today   - prior charts from stroke 1m ago show no stenosis of b/l ICA. likely etiology of stroke is infarcts 2/2 htn/dm    # multiple myeloma  - no tx currently  - heme consult req for AC mgmt    # R PCA with Hemorrhagic conversion  - unknown etiology of last stroke, must chart review  - imaging found hemorrhagic conversion 1m after old stroke  - neurosurgery recs no AC/AP until cleared  - < from: MR Head No Cont (09.24.24 @ 20:06) >Subacute infarct in the right posterior cerebral artery vascular territory with hemorrhagic conversion appears grossly unchanged from CT of 09/24/2024.    # Pancytopenia  - since admission, WBC ~2, RBC ~4, plt 140-150  - BM suppression?    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 Admitted for hemorrhagic conversion of R PCA. Physical exam shows a 2 inch lateral cut on forehead.    # ams / likely Delirium due to cva   - received 5mg olanzapine yesterday for agitation (11AM, 7PM 2.5mg each)  - watch for further delirium as pending dispo    # afib workup # stroke workup  - daughter would like input of Dr. Ariza (PCP/Cardiologist)  - Spoke w Dr. Ariza yesterday; recommends ILR  - neuro states pt should f/u w cardiology for ILR, can use primary cardiologist  - prior charts from stroke 1m ago show no stenosis of b/l ICA. likely etiology of stroke is infarcts 2/2 htn/dm    # multiple myeloma  - no tx currently  - heme consult req for AC mgmt  - heme recommends Dr. Matias (pt prior provider) to manage AC for multiple myeloma    # R PCA with Hemorrhagic conversion  - unknown etiology of last stroke, must chart review  - imaging found hemorrhagic conversion 1m after old stroke  - neurosurgery recs no AC/AP until cleared  - < from: MR Head No Cont (09.24.24 @ 20:06) >Subacute infarct in the right posterior cerebral artery vascular territory with hemorrhagic conversion appears grossly unchanged from CT of 09/24/2024.    # Pancytopenia  - since admission, WBC ~2, RBC ~4, plt 140-150  - BM suppression?    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 Admitted for hemorrhagic conversion of R PCA. Physical exam shows a 2 inch lateral cut on forehead.    # ams / likely multifactorial  Delirium due to cva / hospital stay   - received 5mg olanzapine yesterday for agitation (11AM, 7PM 2.5mg each)  - watch for further delirium as pending dispo  - check ua / bladder scan to r/o uti / retention     # R PCA with Hemorrhagic conversion  - unknown etiology of last stroke, must chart review  - imaging found hemorrhagic conversion 1m after old stroke  - neurosurgery recs no AC/AP until cleared  - < from: MR Head No Cont (09.24.24 @ 20:06) >Subacute infarct in the right posterior cerebral artery vascular territory with hemorrhagic conversion appears grossly unchanged from CT of 09/24/2024.      # afib workup # stroke workup  - daughter would like input of Dr. Ariza (PCP/Cardiologist)  - Spoke w Dr. Ariza yesterday; recommends ILR  - neuro states pt should f/u w cardiology for ILR, can use primary cardiologist  - prior charts from stroke 1m ago show no stenosis of b/l ICA. likely etiology of stroke is infarcts 2/2 htn/dm    # multiple myeloma  - no tx currently  - heme consult req for AC mgmt  - heme recommends Dr. Matias (pt prior provider) to manage AC for multiple myeloma      # Pancytopenia  - since admission, WBC ~2, RBC ~4, plt 140-150  - BM suppression?    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  daughter does not want him to go back to rehab

## 2024-09-26 NOTE — DIETITIAN INITIAL EVALUATION ADULT - ORAL INTAKE PTA/DIET HISTORY
Pt on a 1:1 and not a candidate ti be interviewed. As per EMR weights, pt with 10# weight loss over last 4 years. Poor po intake noted as per flow sheets.  Palliative following for GOC.

## 2024-09-26 NOTE — CONSULT NOTE ADULT - NS ATTEND AMEND GEN_ALL_CORE FT
Agree with NP's assessment and plan.
Agree with above assessment and plan.  Follow up with outpatient oncologist

## 2024-09-26 NOTE — CONSULT NOTE ADULT - SUBJECTIVE AND OBJECTIVE BOX
[facility]                                              CARDIOLOGY AT [place] [address]                                             Telephone: [phone]. Fax:[phone]                                                       CARDIOLOGY CONSULTATION NOTE                                                                                             History obtained by: Patient and medical record  Community Cardiologist: Dr. Taqueria Ariza   obtained: Yes [  ] No [ X ]  Reason for Consultation: CVA  Available out pt records reviewed: Yes [ X ] No [  ]    Chief complaint:    Patient is a 84y old  Male who presents with a chief complaint of Right PCA infarct with hemorrhagic conversion (26 Sep 2024 10:21)      HPI: Patient is an 83 y/o M with a PMHx of right occipital infarct (08/24/24) on plavix, PVCs, multiple myeloma, left PCA stenosis, bladder Ca, HTN, HLD, DMII, and GERD who presented to [facility] after a fall from rehab. Patient is very lethargic at this time, but his daughter Dionne is at the bedside to provide history. Patient had been in the rehab after his CVA at the end of August, but awoke around 3 AM, felt wobbly, and fell forward. Patient noted headstrike, and has a small abrasion and hematoma on his forehead. Patient denied any LOC. Per Dionne, patient didn't have a COURTNEY while at [facility], but followed up with his Cardiologist who placed an MCOT monitor for stroke workup. However, the MCOT still hasn't been returned to his Cardiologist, but also the rehab facility did not charge the battery either. Patient was noted to be agitated today, so received zyprexa. Patient unable to provide ROS at this time due to lethargy.      CARDIAC TESTING   ECHO:  < from: TTE W or WO Ultrasound Enhancing Agent (09.24.24 @ 09:29) >  CONCLUSIONS:      1. Left ventricular systolic function is normal with an ejection fraction of 60 % by Ro's method of disks.   2. There is no evidence of a left ventricular thrombus.  3. Basal inferolateral segment is abnormal.   4. Normal right ventricular cavity size and normal right ventricular systolic function.   5. Moderate aortic regurgitation.   6. Estimated pulmonary artery systolic pressure is 23 mmHg, consistent with normal pulmonary artery pressure.   7. No prior echocardiogram is available for comparison.   8. No pericardial effusion seen.    < end of copied text >    STRESS:    CATH:     ELECTROPHYSIOLOGY:     PAST MEDICAL HISTORY  Hypothyroid    HLD (hyperlipidemia)    HTN (hypertension)    Gastroesophageal reflux disease, esophagitis presence not specified    Pre-diabetes    Type 2 diabetes mellitus    Cerebrovascular accident (CVA)        PAST SURGICAL HISTORY  No significant past surgical history    H/O arthroscopy of right knee        SOCIAL HISTORY:  Denies smoking/alcohol/drugs      FAMILY HISTORY:    Family History of Cardiovascular Disease:  Yes [  ] No [  ]  Coronary Artery Disease in first degree relative: Yes [  ] No [  ]  Sudden Cardiac Death in First degree relative: Yes [  ] No [  ]    HOME MEDICATIONS:  acetaminophen 325 mg oral tablet: 2 tab(s) orally every 4 hours, As needed, Temp greater or equal to 38.5C (101.3F) (21 Apr 2020 09:31)  atorvastatin 80 mg oral tablet: 1 tab(s) orally once a day (at bedtime) (23 Sep 2024 23:54)  clopidogrel: 75 milligram(s) orally once a day (23 Sep 2024 22:01)  folic acid 1 mg oral tablet: 1 tab(s) orally once a day (11 Apr 2020 08:57)  levothyroxine 75 mcg (0.075 mg) oral tablet: 1 tab(s) orally once a day (11 Apr 2020 08:57)  metFORMIN 500 mg oral tablet: 500 milligram(s) orally once a day (11 Apr 2020 08:57)  metoprolol succinate 25 mg oral tablet, extended release: 1 tab(s) orally once a day (11 Apr 2020 08:57)  pantoprazole 40 mg oral delayed release tablet: 1 tab(s) orally once a day (11 Apr 2020 08:57)  tamsulosin 0.4 mg oral capsule: 1 cap(s) orally once a day (at bedtime) (21 Apr 2020 09:31)      CURRENT CARDIAC MEDICATIONS:  metoprolol succinate ER 25 milliGRAM(s) Oral daily      CURRENT OTHER MEDICATIONS:  acetaminophen     Tablet .. 650 milliGRAM(s) Oral every 6 hours PRN Mild Pain (1 - 3), Moderate Pain (4 - 6), Severe Pain (7 - 10)  melatonin 3 milliGRAM(s) Oral at bedtime PRN Insomnia  OLANZapine Injectable 2.5 milliGRAM(s) IntraMuscular every 8 hours PRN agitation  pantoprazole    Tablet 40 milliGRAM(s) Oral before breakfast  senna 2 Tablet(s) Oral at bedtime  atorvastatin 40 milliGRAM(s) Oral at bedtime  chlorhexidine 2% Cloths 1 Application(s) Topical <User Schedule>  folic acid 1 milliGRAM(s) Oral daily  insulin lispro (ADMELOG) corrective regimen sliding scale   SubCutaneous Before meals and at bedtime  levothyroxine 75 MICROGram(s) Oral daily  tamsulosin 0.4 milliGRAM(s) Oral at bedtime      ALLERGIES:   No Known Allergies      REVIEW OF SYMPTOMS: Unable to obtain      VITAL SIGNS:  T(C): 37.1 (09-26-24 @ 10:04), Max: 37.1 (09-26-24 @ 10:04)  T(F): 98.7 (09-26-24 @ 10:04), Max: 98.7 (09-26-24 @ 10:04)  HR: 81 (09-26-24 @ 10:04) (62 - 93)  BP: 118/75 (09-26-24 @ 10:04) (112/62 - 145/75)  RR: 18 (09-26-24 @ 10:04) (18 - 18)  SpO2: 97% (09-26-24 @ 10:04) (96% - 99%)    INTAKE AND OUTPUT:       PHYSICAL EXAM:  Constitutional: Comfortable . No acute distress. Lethargic  HEENT: Atraumatic and normocephalic , neck is supple . no JVD. No carotid bruit.  CNS: A&Ox1.   Respiratory: CTAB, unlabored   Cardiovascular: RRR normal s1 s2.  No rubs or gallop. II/VI diastolic murmur lsb  Gastrointestinal: Soft, non-tender. +Bowel sounds.   Extremities: 2+ Peripheral Pulses, No clubbing, cyanosis, or edema  Psychiatric: Calm . no agitation.   Skin: Warm and dry, no ulcers on extremities     LABS:                            10.6   2.16  )-----------( 151      ( 25 Sep 2024 05:59 )             33.3     09-25    140  |  107  |  30.4[H]  ----------------------------<  94  3.8   |  20.0[L]  |  1.71[H]    Ca    9.0      25 Sep 2024 05:59  Phos  3.3     09-25  Mg     1.9     09-25        Urinalysis Basic - ( 25 Sep 2024 05:59 )    Color: x / Appearance: x / SG: x / pH: x  Gluc: 94 mg/dL / Ketone: x  / Bili: x / Urobili: x   Blood: x / Protein: x / Nitrite: x   Leuk Esterase: x / RBC: x / WBC x   Sq Epi: x / Non Sq Epi: x / Bacteria: x              INTERPRETATION OF TELEMETRY: SR, PVCs, PACs, SB    ECG: SB, PRWP  Prior ECG: Yes [  ] No [  ]    RADIOLOGY & ADDITIONAL STUDIES:    X-ray:    CT scan:   < from: CT Head No Cont (09.24.24 @ 14:57) >  FINDINGS:    There is a stable subacute infarct in the right PCA territory with   associated hemorrhagic conversion. There is no interval rebleeding. There   are also small chronic lacunar infarcts in the right basal ganglia.    Moderate generalized cerebral volume loss, with distention of the sulci   and concomitant ex-vacuo ventricular dilatation. Mild-to-moderate   nonspecific low attenuation in the periventricular and subcortical white   matter, likely due to small vessel disease.    No midline shift or herniation. No CT evidence of acute territorial   infarction elsewhere, although MRI with DWI would be more sensitive.    Limited views of the sinuses and mastoids show mild mucosal thickening   without air-fluid levels, likely chronic. The right mastoid is   underdeveloped. Left lens implant. Limited views of the orbits and   visualized soft tissues of the neck, face, scalp, skull base, and   calvarium are otherwise unremarkable.    IMPRESSION:    1.  No significant change, without interval rebleeding.    < end of copied text >    MRI:   < from: MR Head No Cont (09.24.24 @ 20:06) >  FINDINGS:    VENTRICLES AND SULCI: Mild to moderate age related involutional changes.  INTRA-AXIAL: There is a subacute infarct in the right posterior cerebral   artery vascular territory, involving the medial right occipital lobe and   posterior medial right temporal lobe, which demonstrates hemorrhagic   conversion.  There are scattered T1 hyperintense subacute blood products   throughout the infarct, greatest in the medial right occipital lobe.    There is an approximately 2.9 x 2 cm focus of diffusion restriction in   the medial right occipital lobe which appears to correspond to   parenchymal hematoma.  The remainder of the infarct does not demonstrate   diffusion signal abnormality, indicating it is greater than 7-10 days   old.  There is mild regional mass effect in the right occipital and   temporal lobes, without midline shift.  Mild T2 FLAIR signal   hyperintensity periventricular white matter is consistent with mild   microvascular type changes.  There are chronic lacunar infarcts in the   right corona radiata, within or adjacent to the lentiform nuclei   bilaterally, and in the right thalamus.  EXTRA-AXIAL: No blood products or fluid collection.    SINUSES:  Scattered mild mucosal thickening.  MASTOIDS:  Clear.  ORBITS: Status post left lens replacement.  CALVARIUM: Intact.    MISCELLANEOUS: None.      IMPRESSION:  Subacute infarct in the right posterior cerebral artery vascular   territory with hemorrhagic conversion appears grossly unchanged from CT   of 09/24/2024.    < end of copied text >    US:

## 2024-09-26 NOTE — PROGRESS NOTE ADULT - ASSESSMENT
Patient is a pleasant 83 y/o Male with a PMHx of Right occipital infarct on 8/24/24 now on Plavix for one week, DM, HTN, HLD, GERD, who presented to [facility] ED on 9/23/24 following a fall at nursing home around 3am that morning. He reported going to the bathroom, feeling wobbly and then falling.  He reported he occasionally experiences similar wobbling symptoms.  He endorsed a headstrike and has a linear abrasion across his forehead with a small hematoma.  He denies LOC.  The patient's daughter reported hearing about the fall from his nursing home that morning and then called the ambulance after consulting with the Pt's neurologist. He is AOx1 with Left visual deficits which the daughter reports is his baseline since his stroke in August.  He denies pain anywhere except mild soreness in his Right trapezius.     Ischemic stroke from ~1 month ago with hemorrhagic transformation. Discharge paperwork from prior hospitalization obtained as daughter brought in, chart reviewed.  Per paperwork, patient was hospitalized at [facility], originally presented on 8/24/24 after tripping over e-scooter, found to have acute Right occipital infarct with NO hemorrhagic transformation. 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 Cardiology c/s due to PVCs in hospital.    IMPRESSION: Right occipital infarction with hemorrhagic transformation. Etiology of stroke not stated in previous paperwork however concern for symptomatic intracranial atherosclerosis.  Timeline and/or precipitating events around hemorrhagic transformation unclear at this time. No notation in paperwork regarding when/why patient started on Plavix monotherapy (was started ?~1 week ago). Reviewed MR, hemorrhagic transformation appears late subacute.     Discussed previous hospitalization and current hospitalization at length with patient's daughter, Dionne, via telephone. Per Dionne, patient was taking ASA 81mg PO daily prior to stroke 8/24/24, and cardiologist felt he "failed" ASA, so he was taken off ASA and put on Plavix 75mg PO daily. Dionne stated no neurological imaging was performed to her knowledge between discharge from [facility] until current hospital admission.     Per Dionne, patient was discharged from [facility] with external heart monitor however it required charging every 3-4 days and Dionne stated the rehab was not charging battery; when patient arrived to ED, Dionne removed external heart monitor and has it in her possession, states it was on for maybe 1-2 weeks. Dionne is agreeable to either MCOT or ILR, pending medicine discussion with patient's PCP/Cardiologist Dr. Taqueria Ariza per her request. She states she typically looks to Dr. Ariza for guidance in her father's care and will do whichever he thinks is best. Discussed both MCOT and ILR with Dionne, and relayed that even if patient were to have indication for anticoagulation, further discussion would need to be had regarding risk vs benefit. Contact info for Dr. Ariza reported to Medicine resident Dr. Awan. Also discussed patient's current dx of smoldering multiple myeloma, patient's oncologist is Dr. Wilmer Matias.     NEURO:   -Neurologically patient at baseline per daughter   -Continue close monitoring for neurologic deterioration    -Stroke neuro checks q 4 hours   -SBP goal normotension, avoiding rapid fluctuations in the setting of atherosclerotic disease  -ANTITHROMBOTIC THERAPY: Recommend holding antiplatelets for at LEAST 2 weeks, obtaining f/u CT Head prior to reinitiation, pending clinical course and neurosurgery clearance. If/when patient cleared to restart antiplatelet therapy AFTER at least 2 weeks and f/u CT Head, would recommend reinitiation of ASA 81mg PO daily AND Clopidogrel 75mg PO daily   -STATIN THERAPY: Atorvastatin 40mg PO daily, LDL 45, titrate statin to LDL goal less than 70  -HgA1C 6.1  -MRI Brain w/o as noted  -Dysphagia screen: PASS  -Physical therapy/OT/Speech eval/treatment  -Heme/onc f/u in the setting of smoldering multiple myeloma to r/o underlying hypercoagulable state   -TTE as noted, EF 60%, no evidence of cardiac source of stroke at this time  -Cardiac monitoring w/ telemetry for now  -Long-term cardiac event monitoring to assess for underlying afib, MCOT vs ILR  -DVT ppx: Heparin s.c [] LMWH [] SCD[x]    -Na Goal: 135-145   -Monitor for si/sx of infection   -Stroke education     OTHER: Condition and plan of care d/w patient, questions and concerns addressed.     DISPOSITION:   Dionne states she would like to bring her father home with home care and home therapies after discharge, does not have an interest in rehab at this time    CORE MEASURES:        Admission NIHSS: 3     Tenecteplase : [] YES [x] NO      LDL/A1C: 45/6.1     Depression Screen- if depression hx and/or present      Statin Therapy: Atorvastatin 40mg      Dysphagia Screen: [x] PASS [] FAIL     Smoking [] YES [x] NO      Afib [] YES [x] NO     Stroke Education [] YES [] NO [x] PENDING

## 2024-09-26 NOTE — SWALLOW BEDSIDE ASSESSMENT ADULT - SWALLOW EVAL: DIAGNOSIS
Mod oral dysphagia impacted upon by cognitive limitations and edentulous state. Insufficient breakdown with excessive & prolonged manipulation attempts of soft & bite-sized trials. Ultimately necessitating manual removal of trial via SLP gloved finger. Improved & overall timely management of minced/moist. Piecemeal deglutition noted consistently across all trials, no anterior loss or oral stasis. Pharyngeal phase of swallow without overt s/s penetration or aspiration.

## 2024-09-26 NOTE — CONSULT NOTE ADULT - REASON FOR ADMISSION
Right PCA infarct with hemorrhagic conversion

## 2024-09-26 NOTE — PROGRESS NOTE ADULT - SUBJECTIVE AND OBJECTIVE BOX
Preliminary note, official recommendations pending attending review/signature   [facility] Stroke Team  Progress Note     HPI:  Patient is a pleasant 85 y/o Male with a PMHx of Right occipital infarct on 8/24/24 now on Plavix for one week, DM, HTN, HLD, GERD, who presented to [facility] ED on 9/23/24 following a fall at nursing home around 3am that morning. He reported going to the bathroom, feeling wobbly and then falling.  He reported he occasionally experiences similar wobbling symptoms.  He endorsed a headstrike and has a linear abrasion across his forehead with a small hematoma.  He denies LOC.  The patient's daughter reported hearing about the fall from his nursing home that morning and then called the ambulance after consulting with the Pt's neurologist. He is AOx1 with Left visual deficits which the daughter reports is his baseline since his stroke in August.  He denies pain anywhere except mild soreness in his Right trapezius.     SUBJECTIVE: Overnight, patient with agitation requiring zyprexa. No other events overnight. No new neurologic complaints.  ROS reported negative unless otherwise noted.    MEDICATIONS:  acetaminophen     Tablet .. 650 milliGRAM(s) Oral every 6 hours PRN  atorvastatin 40 milliGRAM(s) Oral at bedtime  chlorhexidine 2% Cloths 1 Application(s) Topical <User Schedule>  folic acid 1 milliGRAM(s) Oral daily  insulin lispro (ADMELOG) corrective regimen sliding scale   SubCutaneous Before meals and at bedtime  levothyroxine 75 MICROGram(s) Oral daily  melatonin 3 milliGRAM(s) Oral at bedtime PRN  metoprolol succinate ER 25 milliGRAM(s) Oral daily  OLANZapine Injectable 2.5 milliGRAM(s) IntraMuscular every 8 hours PRN  pantoprazole    Tablet 40 milliGRAM(s) Oral before breakfast  senna 2 Tablet(s) Oral at bedtime  tamsulosin 0.4 milliGRAM(s) Oral at bedtime      Vital Signs Last 24 Hrs  T(C): 37.1 (26 Sep 2024 10:04), Max: 37.1 (25 Sep 2024 11:37)  T(F): 98.7 (26 Sep 2024 10:04), Max: 98.7 (25 Sep 2024 11:37)  HR: 81 (26 Sep 2024 10:04) (59 - 93)  BP: 118/75 (26 Sep 2024 10:04) (112/62 - 145/75)  BP(mean): --  RR: 18 (26 Sep 2024 10:04) (18 - 18)  SpO2: 97% (26 Sep 2024 10:04) (96% - 99%)    Parameters below as of 26 Sep 2024 10:04  Patient On (Oxygen Delivery Method): room air      PHYSICAL EXAM:    PHYSICAL EXAM:  General: No acute distress    NEUROLOGICAL EXAM:  Mental status: The patient is awake and alert and has normal attention span. The patient is oriented to self only. The patient is able to name objects, follow commands, repeat sentences.    Cranial nerves: Pupils equal and react symmetrically to light. +Left visual field cut. Extraocular motion is full with no nystagmus. There is no ptosis. Facial sensation is intact. Facial musculature is symmetric. Palate elevates symmetrically. Tongue is midline.    Motor: There is normal bulk and tone.  There is no tremor.  Strength is 5/5 in the right arm and leg.   Strength is 5/5 in the left arm and leg. Slight pronator drift LUE.     Sensation: Intact to light touch in 4 extremities    Reflexes: 1-2+ throughout and plantar responses are flexor.    Cerebellar: There is no dysmetria on finger to nose testing.    Gait: Deferred      LABS:                        10.6   2.16  )-----------( 151      ( 25 Sep 2024 05:59 )             33.3    09-25    140  |  107  |  30.4[H]  ----------------------------<  94  3.8   |  20.0[L]  |  1.71[H]    Ca    9.0      25 Sep 2024 05:59  Phos  3.3     09-25  Mg     1.9     09-25    LDL 45  HgbA1c 6.1    RADIOLOGY & ADDITIONAL STUDIES (independently reviewed unless otherwise noted):  MR Head No Cont (09.24.24 @ 20:06)  IMPRESSION:  Subacute infarct in the right posterior cerebral artery vascular   territory with hemorrhagic conversion appears grossly unchanged from CT   of 09/24/2024.    CT Head No Cont (09.24.24 @ 14:57)  IMPRESSION:  1.  No significant change, without interval rebleeding.    TTE W or WO Ultrasound Enhancing Agent (09.24.24 @ 09:29)  CONCLUSIONS:   1. Left ventricular systolic function is normal with an ejection fraction of 60 % by Ro's method of disks.   2. There is no evidence of a left ventricular thrombus.  3. Basal inferolateral segment is abnormal.   4. Normal right ventricular cavity size and normal right ventricular systolic function.   5. Moderate aortic regurgitation.   6. Estimated pulmonary artery systolic pressure is 23 mmHg, consistent with normal pulmonary artery pressure.   7. No prior echocardiogram is available for comparison.   8. No pericardial effusion seen.    CT Brain Stroke Protocol (09.23.24 @ 13:53)  IMPRESSION:  1.  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    CT Head No Cont, CT Cervical Spine (09.23.24 @ 21:12)  IMPRESSION:  CT HEAD:  Stable appearing hemorrhagic infarct within the right PCA territory,   compared with earlier examination also performed today at approximately   1:50 PM.    CT CERVICAL SPINE:  No acute fracture or traumatic subluxation.    Multi-level degenerative changes, as described level by level in detail   above.  If there is clinical concern for myelopathy or radiculopathy,   consider further evaluation via MR imaging of the cervical spine,   provided the patient has no contraindications.

## 2024-09-26 NOTE — SWALLOW BEDSIDE ASSESSMENT ADULT - SLP PERTINENT HISTORY OF CURRENT PROBLEM
85 yo male with a history of HTN, HLD, DM, GERD, multiple myeloma, occipital infarct on 8/24/24, on plavix x 1 week, presents as a transfer from [facility] s/p mechanical fall in the bathroom, which resulted in hemorrhagic conversion of previous R PCA Infarct.  Palliative care consulted for goals fo care.

## 2024-09-26 NOTE — PROGRESS NOTE ADULT - SUBJECTIVE AND OBJECTIVE BOX
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 Imaging found hemorrhagic conversion of R PCA.    INTERVAL HPI/LAST 24HRS EVENTS: Patient seen and examined at bedside at AM. Agitated at 7PM last night. Received 2.5mg zyprexa additional to 11AM dose.    Denies any chest pain, palpitations, SOB, PND, orthopnea, leg edema, N/V/D, or any other complaints.     MEDICATIONS  (STANDING):  atorvastatin 40 milliGRAM(s) Oral at bedtime  chlorhexidine 2% Cloths 1 Application(s) Topical <User Schedule>  folic acid 1 milliGRAM(s) Oral daily  insulin lispro (ADMELOG) corrective regimen sliding scale   SubCutaneous Before meals and at bedtime  levothyroxine 75 MICROGram(s) Oral daily  metoprolol succinate ER 25 milliGRAM(s) Oral daily  pantoprazole    Tablet 40 milliGRAM(s) Oral before breakfast  senna 2 Tablet(s) Oral at bedtime  tamsulosin 0.4 milliGRAM(s) Oral at bedtime    MEDICATIONS  (PRN):  acetaminophen     Tablet .. 650 milliGRAM(s) Oral every 6 hours PRN Mild Pain (1 - 3), Moderate Pain (4 - 6), Severe Pain (7 - 10)  melatonin 3 milliGRAM(s) Oral at bedtime PRN Insomnia  OLANZapine Injectable 2.5 milliGRAM(s) IntraMuscular every 8 hours PRN agitation      Allergies    No Known Allergies    Intolerances        REVIEW OF SYSTEMS:  CONSTITUTIONAL: No fever, weight loss, or fatigue  RESPIRATORY: No cough, wheezing, chills or hemoptysis; No shortness of breath  CARDIOVASCULAR: No chest pain, palpitations, dizziness, or leg swelling  GASTROINTESTINAL: No abdominal or epigastric pain. No nausea, vomiting, or hematemesis; No diarrhea or constipation. No melena or hematochezia.  NEUROLOGICAL: No headaches, loss of strength, numbness, or tremors  MUSCULOSKELETAL: No joint pain or swelling; No muscle, back, or extremity pain    OBJECTIVE:    Vital Signs Last 24 Hrs  T(C): 36.1 (26 Sep 2024 05:00), Max: 37.1 (25 Sep 2024 11:37)  T(F): 96.9 (26 Sep 2024 05:00), Max: 98.7 (25 Sep 2024 11:37)  HR: 93 (26 Sep 2024 05:00) (56 - 93)  BP: 119/72 (26 Sep 2024 05:00) (112/62 - 145/75)  BP(mean): --  RR: 18 (26 Sep 2024 05:00) (18 - 18)  SpO2: 96% (26 Sep 2024 05:00) (96% - 100%)    Parameters below as of 26 Sep 2024 05:00  Patient On (Oxygen Delivery Method): room air        PHYSICAL EXAM:  Constitutional: NAD, looks frail but walks with assistance  Head and Face: Atraumatic, head and face were normal in appearance. 2 inch lateral cut on forehead.  Eyes: Sclera and conjunctiva were normal, pupils were equal in size, round, eyelids normal  ENT: Ears and nose were normal in appearance  Neck: Appearance was normal, neck was supple, No JVD  Pulmonary: Clear to auscultation bilaterally, no rales/crackles, or wheezing  Heart: Regular rate and rhythm, no murmurs, gallops, or pericardial rubs  Abdominal: Soft, nontender, nondistended. No appreciable hepatosplenomegaly. Bowel sounds normal  Skin: Normal color and intact without appreciable rash or abnormal skin lesion  Extremities: Warm without edema. No clubbing.  Pulse: 2+ radial pulse  Neuro: ANOx2    Lab/ Imaging:    LABS:                        10.6   2.16  )-----------( 151      ( 25 Sep 2024 05:59 )             33.3     09-25    140  |  107  |  30.4[H]  ----------------------------<  94  3.8   |  20.0[L]  |  1.71[H]    Ca    9.0      25 Sep 2024 05:59  Phos  3.3     09-25  Mg     1.9     09-25        Urinalysis Basic - ( 25 Sep 2024 05:59 )    Color: x / Appearance: x / SG: x / pH: x  Gluc: 94 mg/dL / Ketone: x  / Bili: x / Urobili: x   Blood: x / Protein: x / Nitrite: x   Leuk Esterase: x / RBC: x / WBC x   Sq Epi: x / Non Sq Epi: x / Bacteria: x      CAPILLARY BLOOD GLUCOSE      POCT Blood Glucose.: 116 mg/dL (25 Sep 2024 21:39)  POCT Blood Glucose.: 110 mg/dL (25 Sep 2024 17:11)  POCT Blood Glucose.: 167 mg/dL (25 Sep 2024 12:18)  POCT Blood Glucose.: 118 mg/dL (25 Sep 2024 08:18)          Imaging Personally Reviewed:  [ ] YES  [ ] NO    Consultant(s) Notes Reviewed:  [ ] YES  [ ] NO    Care Discussed with Consultants/Other Providers [ ] YES  [ ] NO    Plan of Care discussed with Housestaff [ ]YES [ ] NO

## 2024-09-26 NOTE — PHYSICAL THERAPY INITIAL EVALUATION ADULT - IMPAIRMENTS FOUND, PT EVAL
History of Present Illness:   50F with low back pain x  7 years, She says that she has an extensive history of back pain throughout her life and had her first spinal surgery in 2017 in [facility]. She said that after the initial surgery she had 10 screws loosen, and under went a revision with Dr. Schwab a few years later. Today she is having an extension of her fusion for progressive worsening of pain. She says that she had intermittent bilateral sciatica and right lower leg weakness and numbness that has caused her to fall due to not being able to feel the ground she is walking on. She is unable to find comfort and both sitting too long and standing too long provokes pain and numbness/tingling of her lower extremities. She ambulates with a cane at baseline. Despite conservative measure pain persists. She also has intermitted right upper extremity nerve pain, she under went a 1 level ACDF in April 2023.     Denies history of blood clots or seizures. She denies chest pain, shortness of breath, nausea or vomiting today.     Patient currently sees outpatient pain management. She uses fentanyl 50 mcg/hr patch, nucynta 75mg, and pregabalin 200mg for pain relief.     Presented for elective revision PSF with extension of fusion with instrumentation T3-pelvis, pedicle subtraction osteotomy (PSO) L4, tethers, iliac fixation, smith clark osteotomies T3-T10 with Plastics closure with Dr. Schwab, Dr. Gonzalez and Dr. Simpson on 11/14/23.        Patient follows with pain management doctor, Dr. Stanley outpatient who saw and evaluated patient pre-operatively.   Post-op recommendations were made at that time, and included PCA that patient was on overnight.      Patient reported this AM that she is in severe pain, but also has not taken any prn dilaudid PO since 3pm yesterday. Pain is most severe in lower back , and also across posterior ribcage around T4 region. States the ERAS medications are helping but "not enough". Pain management regimen reinforced with patient and reminded patient to call and ask for prn medications when standing ERAS meds are not helping enough. Patient verbalized understanding. gait, locomotion, and balance/muscle strength/poor safety awareness/posture

## 2024-09-26 NOTE — DIETITIAN INITIAL EVALUATION ADULT - PERTINENT LABORATORY DATA
09-25    140  |  107  |  30.4[H]  ----------------------------<  94  3.8   |  20.0[L]  |  1.71[H]    Ca    9.0      25 Sep 2024 05:59  Phos  3.3     09-25  Mg     1.9     09-25    POCT Blood Glucose.: 108 mg/dL (09-26-24 @ 09:05)  A1C with Estimated Average Glucose Result: 6.2 % (09-25-24 @ 05:59)

## 2024-09-27 ENCOUNTER — RESULT REVIEW (OUTPATIENT)
Age: 84
End: 2024-09-27

## 2024-09-27 LAB
ALBUMIN SERPL ELPH-MCNC: 3.8 G/DL — SIGNIFICANT CHANGE UP (ref 3.3–5.2)
ALP SERPL-CCNC: 81 U/L — SIGNIFICANT CHANGE UP (ref 40–120)
ALT FLD-CCNC: 20 U/L — SIGNIFICANT CHANGE UP
ANION GAP SERPL CALC-SCNC: 18 MMOL/L — HIGH (ref 5–17)
AST SERPL-CCNC: 28 U/L — SIGNIFICANT CHANGE UP
BASOPHILS # BLD AUTO: 0.01 K/UL — SIGNIFICANT CHANGE UP (ref 0–0.2)
BASOPHILS NFR BLD AUTO: 0.4 % — SIGNIFICANT CHANGE UP (ref 0–2)
BILIRUB SERPL-MCNC: 0.6 MG/DL — SIGNIFICANT CHANGE UP (ref 0.4–2)
BUN SERPL-MCNC: 28.8 MG/DL — HIGH (ref 8–20)
CALCIUM SERPL-MCNC: 9.8 MG/DL — SIGNIFICANT CHANGE UP (ref 8.4–10.5)
CHLORIDE SERPL-SCNC: 104 MMOL/L — SIGNIFICANT CHANGE UP (ref 96–108)
CO2 SERPL-SCNC: 20 MMOL/L — LOW (ref 22–29)
CREAT SERPL-MCNC: 1.91 MG/DL — HIGH (ref 0.5–1.3)
EGFR: 34 ML/MIN/1.73M2 — LOW
EOSINOPHIL # BLD AUTO: 0.05 K/UL — SIGNIFICANT CHANGE UP (ref 0–0.5)
EOSINOPHIL NFR BLD AUTO: 2.2 % — SIGNIFICANT CHANGE UP (ref 0–6)
GLUCOSE BLDC GLUCOMTR-MCNC: 101 MG/DL — HIGH (ref 70–99)
GLUCOSE BLDC GLUCOMTR-MCNC: 109 MG/DL — HIGH (ref 70–99)
GLUCOSE BLDC GLUCOMTR-MCNC: 196 MG/DL — HIGH (ref 70–99)
GLUCOSE BLDC GLUCOMTR-MCNC: 62 MG/DL — LOW (ref 70–99)
GLUCOSE BLDC GLUCOMTR-MCNC: 64 MG/DL — LOW (ref 70–99)
GLUCOSE BLDC GLUCOMTR-MCNC: 92 MG/DL — SIGNIFICANT CHANGE UP (ref 70–99)
GLUCOSE SERPL-MCNC: 116 MG/DL — HIGH (ref 70–99)
HCT VFR BLD CALC: 35 % — LOW (ref 39–50)
HGB BLD-MCNC: 11.5 G/DL — LOW (ref 13–17)
IMM GRANULOCYTES NFR BLD AUTO: 0.9 % — SIGNIFICANT CHANGE UP (ref 0–0.9)
LYMPHOCYTES # BLD AUTO: 1.1 K/UL — SIGNIFICANT CHANGE UP (ref 1–3.3)
LYMPHOCYTES # BLD AUTO: 49.1 % — HIGH (ref 13–44)
MAGNESIUM SERPL-MCNC: 1.8 MG/DL — SIGNIFICANT CHANGE UP (ref 1.8–2.6)
MCHC RBC-ENTMCNC: 29.9 PG — SIGNIFICANT CHANGE UP (ref 27–34)
MCHC RBC-ENTMCNC: 32.9 GM/DL — SIGNIFICANT CHANGE UP (ref 32–36)
MCV RBC AUTO: 91.1 FL — SIGNIFICANT CHANGE UP (ref 80–100)
MONOCYTES # BLD AUTO: 0.35 K/UL — SIGNIFICANT CHANGE UP (ref 0–0.9)
MONOCYTES NFR BLD AUTO: 15.6 % — HIGH (ref 2–14)
NEUTROPHILS # BLD AUTO: 0.71 K/UL — LOW (ref 1.8–7.4)
NEUTROPHILS NFR BLD AUTO: 31.8 % — LOW (ref 43–77)
PHOSPHATE SERPL-MCNC: 3.3 MG/DL — SIGNIFICANT CHANGE UP (ref 2.4–4.7)
PLATELET # BLD AUTO: 165 K/UL — SIGNIFICANT CHANGE UP (ref 150–400)
POTASSIUM SERPL-MCNC: 3.8 MMOL/L — SIGNIFICANT CHANGE UP (ref 3.5–5.3)
POTASSIUM SERPL-SCNC: 3.8 MMOL/L — SIGNIFICANT CHANGE UP (ref 3.5–5.3)
PROT SERPL-MCNC: 8 G/DL — SIGNIFICANT CHANGE UP (ref 6.6–8.7)
RBC # BLD: 3.84 M/UL — LOW (ref 4.2–5.8)
RBC # FLD: 12.6 % — SIGNIFICANT CHANGE UP (ref 10.3–14.5)
SODIUM SERPL-SCNC: 142 MMOL/L — SIGNIFICANT CHANGE UP (ref 135–145)
WBC # BLD: 2.24 K/UL — LOW (ref 3.8–10.5)
WBC # FLD AUTO: 2.24 K/UL — LOW (ref 3.8–10.5)

## 2024-09-27 PROCEDURE — 93312 ECHO TRANSESOPHAGEAL: CPT | Mod: 26

## 2024-09-27 PROCEDURE — 93320 DOPPLER ECHO COMPLETE: CPT | Mod: 26

## 2024-09-27 PROCEDURE — 33285 INSJ SUBQ CAR RHYTHM MNTR: CPT

## 2024-09-27 PROCEDURE — 99233 SBSQ HOSP IP/OBS HIGH 50: CPT | Mod: GC

## 2024-09-27 PROCEDURE — 99233 SBSQ HOSP IP/OBS HIGH 50: CPT | Mod: FS

## 2024-09-27 PROCEDURE — 93325 DOPPLER ECHO COLOR FLOW MAPG: CPT | Mod: 26

## 2024-09-27 PROCEDURE — 76376 3D RENDER W/INTRP POSTPROCES: CPT | Mod: 26

## 2024-09-27 RX ORDER — ALCOHOL ANTISEPTIC PADS
25 PADS, MEDICATED (EA) TOPICAL ONCE
Refills: 0 | Status: COMPLETED | OUTPATIENT
Start: 2024-09-27 | End: 2024-09-27

## 2024-09-27 RX ORDER — SODIUM CHLORIDE 0.9 % (FLUSH) 0.9 %
1000 SYRINGE (ML) INJECTION
Refills: 0 | Status: DISCONTINUED | OUTPATIENT
Start: 2024-09-27 | End: 2024-09-29

## 2024-09-27 RX ORDER — MAGNESIUM SULFATE 500 MG/ML
2 VIAL (ML) INJECTION ONCE
Refills: 0 | Status: COMPLETED | OUTPATIENT
Start: 2024-09-27 | End: 2024-09-27

## 2024-09-27 RX ORDER — SODIUM CHLORIDE 0.9 % (FLUSH) 0.9 %
1000 SYRINGE (ML) INJECTION
Refills: 0 | Status: DISCONTINUED | OUTPATIENT
Start: 2024-09-27 | End: 2024-09-27

## 2024-09-27 RX ADMIN — ATORVASTATIN CALCIUM 40 MILLIGRAM(S): 10 TABLET, FILM COATED ORAL at 22:25

## 2024-09-27 RX ADMIN — Medication 100 MILLILITER(S): at 08:37

## 2024-09-27 RX ADMIN — PANTOPRAZOLE SODIUM 40 MILLIGRAM(S): 40 TABLET, DELAYED RELEASE ORAL at 05:25

## 2024-09-27 RX ADMIN — Medication 0.4 MILLIGRAM(S): at 22:26

## 2024-09-27 RX ADMIN — Medication 2 TABLET(S): at 22:25

## 2024-09-27 RX ADMIN — Medication 1000 MILLIGRAM(S): at 22:24

## 2024-09-27 RX ADMIN — CHLORHEXIDINE GLUCONATE ORAL RINSE 1 APPLICATION(S): 1.2 SOLUTION DENTAL at 05:25

## 2024-09-27 RX ADMIN — Medication 100 MILLILITER(S): at 16:04

## 2024-09-27 RX ADMIN — Medication 25 MILLIGRAM(S): at 05:25

## 2024-09-27 RX ADMIN — Medication 25 MILLILITER(S): at 22:44

## 2024-09-27 RX ADMIN — Medication 75 MICROGRAM(S): at 05:25

## 2024-09-27 RX ADMIN — Medication 25 GRAM(S): at 16:04

## 2024-09-27 NOTE — DISCHARGE NOTE PROVIDER - PROVIDER TOKENS
PROVIDER:[TOKEN:[[id number]],FOLLOWUP:[1 week]],PROVIDER:[TOKEN:[[id number]],FOLLOWUP:[1 week]],PROVIDER:[TOKEN:[[id number]],FOLLOWUP:[2 weeks]]

## 2024-09-27 NOTE — PROGRESS NOTE ADULT - SUBJECTIVE AND OBJECTIVE BOX
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 Admitted for hemorrhagic conversion of R PCA.   - imaging found hemorrhagic conversion 1m after old stroke  - < from: MR Head No Cont (09.24.24 @ 20:06) >Subacute infarct in the right posterior cerebral artery vascular territory with hemorrhagic conversion appears grossly unchanged from CT of 09/24/2024.                                                                                      Department of Cardiology                                                                  [facility]/[place] [address] [address]                                                            Telephone: [phone]. 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 Admitted for hemorrhagic conversion of R PCA.    imaging found hemorrhagic conversion 1m after old stroke,  from: MR Head No Cont (09.24.24 @ 20:06) >Subacute infarct in the right posterior cerebral artery vascular territory with hemorrhagic conversion appears grossly unchanged from CT of 09/24/2024.   Patient with Pancytopenic, aFIB W/U,  pending ILR   Patient now presents to [facility] for COURTNEY TO evaluate cardioembolic source of stroke   CURRENTLY he denies HA. Dizziness, SOB, CP, Abdominal pain    ASA and Mallampati: Per Anesthesia    	  MEDICATIONS:  metoprolol succinate ER 25 milliGRAM(s) Oral daily    cefTRIAXone Injectable. 1000 milliGRAM(s) IV Push every 24 hours      acetaminophen     Tablet .. 650 milliGRAM(s) Oral every 6 hours PRN  melatonin 3 milliGRAM(s) Oral at bedtime PRN  OLANZapine Injectable 2.5 milliGRAM(s) IntraMuscular every 8 hours PRN    pantoprazole    Tablet 40 milliGRAM(s) Oral before breakfast  senna 2 Tablet(s) Oral at bedtime    atorvastatin 40 milliGRAM(s) Oral at bedtime  insulin lispro (ADMELOG) corrective regimen sliding scale   SubCutaneous Before meals and at bedtime  levothyroxine 75 MICROGram(s) Oral daily    chlorhexidine 2% Cloths 1 Application(s) Topical <User Schedule>  folic acid 1 milliGRAM(s) Oral daily  sodium chloride 0.9%. 1000 milliLiter(s) IV Continuous <Continuous>  tamsulosin 0.4 milliGRAM(s) Oral at bedtime        PHYSICAL EXAM:    T(C): 36.7 (09-27-24 @ 08:25), Max: 37.2 (09-26-24 @ 20:46)  HR: 42 (09-27-24 @ 12:29) (42 - 81)  BP: 140/71 (09-27-24 @ 12:29) (128/74 - 163/74)  RR: 18 (09-27-24 @ 12:29) (16 - 18)  SpO2: 95% (09-27-24 @ 12:29) (92% - 100%)  Wt(kg): --    I&O's Summary    26 Sep 2024 07:01  -  27 Sep 2024 07:00  --------------------------------------------------------  IN: 0 mL / OUT: 880 mL / NET: -880 mL    27 Sep 2024 07:01  -  27 Sep 2024 13:25  --------------------------------------------------------  IN: 0 mL / OUT: 300 mL / NET: -300 mL        Daily     Daily     Constitutional: A & O x 3  HEENT:   Normal oral mucosa, PERRL, EOMI	  Cardiovascular: Normal S1 S2, No JVD, No murmurs, No edema  Respiratory: Lungs clear to auscultation	  Gastrointestinal:  Soft, Non-tender, + BS	  Skin: No rashes, No ecchymoses, No cyanosis  Neurologic: Non-focal  Extremities: Normal range of motion, No clubbing, cyanosis or edema  Vascular: Peripheral pulses palpable 2+ bilaterally    TELEMETRY: 	      ECG:  	    Diagnsotics:    LABS:	 	    CARDIAC MARKERS:                                  11.5   2.24  )-----------( 165      ( 27 Sep 2024 05:43 )             35.0     09-27    142  |  104  |  28.8[H]  ----------------------------<  116[H]  3.8   |  20.0[L]  |  1.91[H]    Ca    9.8      27 Sep 2024 05:43  Phos  3.3     09-27  Mg     1.8     09-27    TPro  8.0  /  Alb  3.8  /  TBili  0.6  /  DBili  x   /  AST  28  /  ALT  20  /  AlkPhos  81  09-27    proBNP:   Lipid Profile:   HgA1c:   TSH:     ASSESSMENT:    -COURTNEY as ordered  -Labs and ECG reviewed  -Procedure discussed with patient; risks and benefits explained; questions answered  -Consent obtained by Echocardiographer and anesthesiologist                                                                                 Department of Cardiology                                                                  [facility]/[place] [address] [address]                                                            Telephone: [phone]. 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 Admitted for hemorrhagic conversion of R PCA.    imaging found hemorrhagic conversion 1m after old stroke,  from: MR Head No Cont (09.24.24 @ 20:06) >Subacute infarct in the right posterior cerebral artery vascular territory with hemorrhagic conversion appears grossly unchanged from CT of 09/24/2024.   Patient with Pancytopenic, aFIB W/U,  pending ILR   Patient now presents to [facility] for COURTNEY TO evaluate cardioembolic source of stroke   CURRENTLY he denies HA. Dizziness, SOB, CP, Abdominal pain    ASA and Mallampati: Per Anesthesia    	  MEDICATIONS:  metoprolol succinate ER 25 milliGRAM(s) Oral daily    cefTRIAXone Injectable. 1000 milliGRAM(s) IV Push every 24 hours      acetaminophen     Tablet .. 650 milliGRAM(s) Oral every 6 hours PRN  melatonin 3 milliGRAM(s) Oral at bedtime PRN  OLANZapine Injectable 2.5 milliGRAM(s) IntraMuscular every 8 hours PRN    pantoprazole    Tablet 40 milliGRAM(s) Oral before breakfast  senna 2 Tablet(s) Oral at bedtime    atorvastatin 40 milliGRAM(s) Oral at bedtime  insulin lispro (ADMELOG) corrective regimen sliding scale   SubCutaneous Before meals and at bedtime  levothyroxine 75 MICROGram(s) Oral daily    chlorhexidine 2% Cloths 1 Application(s) Topical <User Schedule>  folic acid 1 milliGRAM(s) Oral daily  sodium chloride 0.9%. 1000 milliLiter(s) IV Continuous <Continuous>  tamsulosin 0.4 milliGRAM(s) Oral at bedtime        PHYSICAL EXAM:    T(C): 36.7 (09-27-24 @ 08:25), Max: 37.2 (09-26-24 @ 20:46)  HR: 42 (09-27-24 @ 12:29) (42 - 81)  BP: 140/71 (09-27-24 @ 12:29) (128/74 - 163/74)  RR: 18 (09-27-24 @ 12:29) (16 - 18)  SpO2: 95% (09-27-24 @ 12:29) (92% - 100%)  Wt(kg): --    I&O's Summary    26 Sep 2024 07:01  -  27 Sep 2024 07:00  --------------------------------------------------------  IN: 0 mL / OUT: 880 mL / NET: -880 mL    27 Sep 2024 07:01  -  27 Sep 2024 13:25  --------------------------------------------------------  IN: 0 mL / OUT: 300 mL / NET: -300 mL        Daily     Daily     Constitutional:Alert, not follows verbal commands   HEENT:   Normal oral mucosa, PERRL, EOMI	  Cardiovascular: Normal S1 S2, No JVD, No murmurs, No edema  Respiratory: Lungs clear to auscultation	  Gastrointestinal:  Soft, Non-tender, + BS	  Skin: No rashes, No ecchymoses, No cyanosis  Neurologic: alert, not follows verbal commands   Extremities:   Vascular: Peripheral pulses palpable 2+ bilaterally        ECG:  	SR    Diagnsotics:    LABS:	 	    CARDIAC MARKERS:                                  11.5   2.24  )-----------( 165      ( 27 Sep 2024 05:43 )             35.0     09-27    142  |  104  |  28.8[H]  ----------------------------<  116[H]  3.8   |  20.0[L]  |  1.91[H]    Ca    9.8      27 Sep 2024 05:43  Phos  3.3     09-27  Mg     1.8     09-27    TPro  8.0  /  Alb  3.8  /  TBili  0.6  /  DBili  x   /  AST  28  /  ALT  20  /  AlkPhos  81  09-27    proBNP:   Lipid Profile:   HgA1c:   TSH:

## 2024-09-27 NOTE — PROGRESS NOTE ADULT - SUBJECTIVE AND OBJECTIVE BOX
Department of Cardiology                                                                  [facility]/[place] [address]                                                            Telephone: [phone]. Fax:[phone]                                                                         Post-Procedure Note: COURTNEY      Narrative:  Patient now s/p COURTNEY under Anesthesia  IN NO DISTRESS, HEMODYNAMICALLY STABLE      MEDICATIONS:  metoprolol succinate ER 25 milliGRAM(s) Oral daily    cefTRIAXone Injectable. 1000 milliGRAM(s) IV Push every 24 hours      acetaminophen     Tablet .. 650 milliGRAM(s) Oral every 6 hours PRN  melatonin 3 milliGRAM(s) Oral at bedtime PRN  OLANZapine Injectable 2.5 milliGRAM(s) IntraMuscular every 8 hours PRN    pantoprazole    Tablet 40 milliGRAM(s) Oral before breakfast  senna 2 Tablet(s) Oral at bedtime    atorvastatin 40 milliGRAM(s) Oral at bedtime  insulin lispro (ADMELOG) corrective regimen sliding scale   SubCutaneous Before meals and at bedtime  levothyroxine 75 MICROGram(s) Oral daily    chlorhexidine 2% Cloths 1 Application(s) Topical <User Schedule>  folic acid 1 milliGRAM(s) Oral daily  magnesium sulfate  IVPB 2 Gram(s) IV Intermittent once  sodium chloride 0.9%. 1000 milliLiter(s) IV Continuous <Continuous>  tamsulosin 0.4 milliGRAM(s) Oral at bedtime        PHYSICAL EXAM:    T(C): 36.6 (09-27-24 @ 12:29), Max: 37.2 (09-26-24 @ 20:46)  HR: 42 (09-27-24 @ 12:29) (42 - 81)  BP: 140/71 (09-27-24 @ 12:29) (128/74 - 163/74)  RR: 18 (09-27-24 @ 12:29) (16 - 18)  SpO2: 95% (09-27-24 @ 12:29) (92% - 100%)  Wt(kg): --    I&O's Summary    26 Sep 2024 07:01  -  27 Sep 2024 07:00  --------------------------------------------------------  IN: 0 mL / OUT: 880 mL / NET: -880 mL    27 Sep 2024 07:01  -  27 Sep 2024 15:42  --------------------------------------------------------  IN: 0 mL / OUT: 300 mL / NET: -300 mL        Daily     Daily     Constitutional: A & O x 3  HEENT:   Normal oral mucosa, PERRL, EOMI	  Cardiovascular: Normal S1 S2, No JVD, No murmurs, No edema  Respiratory: Lungs clear to auscultation	  Gastrointestinal:  Soft, Non-tender, + BS	  Skin: No rashes, No ecchymoses, No cyanosis  Neurologic: Non-focal  Extremities: Normal range of motion, No clubbing, cyanosis or edema  Vascular: Peripheral pulses palpable 2+ bilaterally

## 2024-09-27 NOTE — DISCHARGE NOTE PROVIDER - NSDCCPTREATMENT_GEN_ALL_CORE_FT
PRINCIPAL PROCEDURE  Procedure: Transesophageal echocardiogram (COURTNEY)  Findings and Treatment: S/P COURTNEY ON 09/27/24  no thrombus   Follow up with your cardiologist in 2 weeks post discharge     PRINCIPAL PROCEDURE  Procedure: Transesophageal echocardiogram (COURTNEY)  Findings and Treatment: COURTNEY ON 09/27/24: Your results showed that you did not have a thrombus. Please follow up with your cardiologist (Dr. Ariza) in 2 weeks post discharge

## 2024-09-27 NOTE — DISCHARGE NOTE PROVIDER - ATTENDING DISCHARGE PHYSICAL EXAMINATION:
PHYSICAL EXAM:  Constitutional: Interactive, comfortable, seated in chair.  Head and Face: Atraumatic, head and face were normal in appearance  Eyes: Sclera and conjunctiva were normal, pupils were equal in size, round, eyelids normal. Left complete hemianopsia.  ENT: Ears and nose were normal in appearance  Neck: Appearance was normal, neck was supple, No JVD  Pulmonary: Clear to auscultation bilaterally, no rales/crackles, or wheezing  Heart: Regular rate and rhythm, no murmurs, gallops, or pericardial rubs  Abdominal: Soft, nontender, nondistended. No appreciable hepatosplenomegaly. Bowel sounds normal  Skin: Normal color and intact without appreciable rash or abnormal skin lesion  Extremities: Warm without edema. No clubbing.  Pulse: 2+ radial pulse  Neuro: Oriented to person. Did not know his birthday and thought we were in a rehab center. Did not know we were in a hospital or that he had a UTI.

## 2024-09-27 NOTE — PROGRESS NOTE ADULT - ASSESSMENT
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 Admitted for hemorrhagic conversion of R PCA. Physical exam shows a 2 inch lateral cut on forehead.    # ams / likely multifactorial  Delirium due to cva / hospital stay   - received 5mg olanzapine yesterday for agitation (11AM, 7PM 2.5mg each)  - watch for further delirium as pending dispo  - check ua / bladder scan to r/o uti / retention     # R PCA with Hemorrhagic conversion  - unknown etiology of last stroke, must chart review  - imaging found hemorrhagic conversion 1m after old stroke  - neurosurgery recs no AC/AP until cleared  - < from: MR Head No Cont (09.24.24 @ 20:06) >Subacute infarct in the right posterior cerebral artery vascular territory with hemorrhagic conversion appears grossly unchanged from CT of 09/24/2024.      # afib workup # stroke workup  - daughter would like input of Dr. Ariza (PCP/Cardiologist)  - Spoke w Dr. Ariza yesterday; recommends ILR  - neuro states pt should f/u w cardiology for ILR, can use primary cardiologist  - prior charts from stroke 1m ago show no stenosis of b/l ICA. likely etiology of stroke is infarcts 2/2 htn/dm    # multiple myeloma  - no tx currently  - heme consult req for AC mgmt  - heme recommends Dr. Matias (pt prior provider) to manage AC for multiple myeloma      # Pancytopenia  - since admission, WBC ~2, RBC ~4, plt 140-150  - BM suppression?    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 Admitted for hemorrhagic conversion of R PCA. Physical exam shows a 2 inch lateral cut on forehead.    # ams / likely multifactorial  Delirium due to cva / hospital stay   - received 2.5mg olanzapine last night 8PM  - had pos UA for UTI, will start ABx  - retaining urine since 9/26 >500mL  - q6h bladder scans with straight cath     # R PCA with Hemorrhagic conversion  - unknown etiology of last stroke, must chart review  - imaging found hemorrhagic conversion 1m after old stroke  - neurosurgery recs no AC/AP until cleared  - < from: MR Head No Cont (09.24.24 @ 20:06) >Subacute infarct in the right posterior cerebral artery vascular territory with hemorrhagic conversion appears grossly unchanged from CT of 09/24/2024.      # afib workup # stroke workup  - daughter would like input of Dr. Ariza (PCP/Cardiologist)  - Spoke w Dr. Ariza yesterday; recommends ILR  - neuro states pt should f/u w cardiology for ILR, can use primary cardiologist  - prior charts from stroke 1m ago show no stenosis of b/l ICA. likely etiology of stroke is infarcts 2/2 htn/dm    # multiple myeloma  - no tx currently  - heme consult req for AC mgmt  - heme recommends Dr. Matias (pt prior provider) to manage AC for multiple myeloma      # Pancytopenia  - since admission, WBC ~2, RBC ~4, plt 140-150  - BM suppression?    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 Admitted for hemorrhagic conversion of R PCA. Physical exam shows a 2 inch lateral cut on forehead.    # ams / likely multifactorial  Delirium due to cva / hospital stay   - received 2.5mg olanzapine last night 8PM  - had pos UA for UTI, will start ABx  - retaining urine since 9/26 >500mL  - q6h bladder scans with straight cath, possible vasquez needed     # R PCA with Hemorrhagic conversion  - unknown etiology of last stroke, must chart review  - imaging found hemorrhagic conversion 1m after old stroke  - neurosurgery recs no AC/AP until cleared  - < from: MR Head No Cont (09.24.24 @ 20:06) >Subacute infarct in the right posterior cerebral artery vascular territory with hemorrhagic conversion appears grossly unchanged from CT of 09/24/2024.      # afib workup # stroke workup  - daughter would like input of Dr. Ariza (PCP/Cardiologist)  - Spoke w Dr. Ariza yesterday; recommends ILR  - neuro states pt should f/u w cardiology for ILR, can use primary cardiologist or inpt service  - prior charts from stroke 1m ago show no stenosis of b/l ICA. likely etiology of stroke is infarcts 2/2 htn/dm  - pending ILR today if possible with cardiology    # multiple myeloma  - no tx currently  - heme consult req for AC mgmt  - heme recommends Dr. Matias (pt prior provider) to manage AC for multiple myeloma      # Pancytopenia  - since admission, WBC ~2, RBC ~4, plt 140-150  - BM suppression?    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 Admitted for hemorrhagic conversion of R PCA. Physical exam shows a 2 inch lateral cut on forehead.    # ams / likely multifactorial  Delirium due to cva / hospital stay   - received 2.5mg olanzapine last night 8PM  - had pos UA for UTI, will start ABx  - retaining urine since 9/26 >500mL  - q6h bladder scans with straight cath, possible vasquez needed     # R PCA with Hemorrhagic conversion  - unknown etiology of last stroke, must chart review  - imaging found hemorrhagic conversion 1m after old stroke  - neurosurgery recs no AC/AP until cleared  - < from: MR Head No Cont (09.24.24 @ 20:06) >Subacute infarct in the right posterior cerebral artery vascular territory with hemorrhagic conversion appears grossly unchanged from CT of 09/24/2024.      # afib workup # stroke workup  - daughter would like input of Dr. Ariza (PCP/Cardiologist)  - Spoke w Dr. Ariza yesterday; recommends ILR  - neuro states pt should f/u w cardiology for ILR, can use primary cardiologist or inpt service  - prior charts from stroke 1m ago show no stenosis of b/l ICA. likely etiology of stroke is infarcts 2/2 htn/dm  - pending ILR  - COURTNEY today if possible with cardiology    # multiple myeloma  - no tx currently  - heme consult req for AC mgmt  - heme recommends Dr. Matias (pt prior provider) to manage AC for multiple myeloma      # Pancytopenia  - since admission, WBC ~2, RBC ~4, plt 140-150  - BM suppression?    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 Admitted for hemorrhagic conversion of R PCA. Physical exam shows a 2 inch lateral cut on forehead.    # ams / likely multifactorial  Delirium due to cva / hospital stay   - received 2.5mg olanzapine last night 8PM  - had pos UA for UTI, will start ABx  - retaining urine since 9/26 >500mL  - q6h bladder scans with straight cath, possible vasquez needed     # R PCA with Hemorrhagic conversion  - unknown etiology of last stroke, must chart review  - imaging found hemorrhagic conversion 1m after old stroke  - neurosurgery recs no AC/AP until cleared  - < from: MR Head No Cont (09.24.24 @ 20:06) >Subacute infarct in the right posterior cerebral artery vascular territory with hemorrhagic conversion appears grossly unchanged from CT of 09/24/2024.      # afib workup # stroke workup  - daughter would like input of Dr. Ariza (PCP/Cardiologist)  - Spoke w Dr. Ariza yesterday; recommends ILR  - neuro states pt should f/u w cardiology for ILR, can use primary cardiologist or inpt service  - prior charts from stroke 1m ago show no stenosis of b/l ICA. likely etiology of stroke is infarcts 2/2 htn/dm  - pending ILR  - COURTNEY results today shows LVEF 65-70%  - neuro recs: hold antiplt at least 2 weeks, obtaining f/u CT Head prior to reinitiation, pending clinical course and neurosurgery clearance  - If/when patient cleared to restart antiplatelet therapy AFTER at least 2 weeks and f/u CT Head, would recommend reinitiation of ASA 81mg PO daily AND Clopidogrel 75mg PO daily       # multiple myeloma  - no tx currently  - heme consult req for AC mgmt  - heme recommends Dr. Matias (pt prior provider) to manage AC for multiple myeloma      # Pancytopenia  - since admission, WBC ~2, RBC ~4, plt 140-150  - BM suppression?    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 Admitted for hemorrhagic conversion of R PCA. Physical exam shows a 2 inch lateral cut on forehead.    # ams / likely multifactorial  Delirium due to cva / hospital stay / uti   - received 2.5mg olanzapine last night 8PM  - had pos UA for UTI, will start ABx  - retaining urine since 9/26 >500mL  - q6h bladder scans with straight cath, possible vasquez needed     # R PCA with Hemorrhagic conversion  - unknown etiology of last stroke, must chart review  - imaging found hemorrhagic conversion 1m after old stroke  - neurosurgery recs no AC/AP until cleared  - < from: MR Head No Cont (09.24.24 @ 20:06) >Subacute infarct in the right posterior cerebral artery vascular territory with hemorrhagic conversion appears grossly unchanged from CT of 09/24/2024.      # afib workup # stroke workup  - daughter would like input of Dr. Ariza (PCP/Cardiologist)  - Spoke w Dr. Ariza yesterday; recommends ILR  - neuro states pt should f/u w cardiology for ILR, can use primary cardiologist or inpt service  - prior charts from stroke 1m ago show no stenosis of b/l ICA. likely etiology of stroke is infarcts 2/2 htn/dm  - pending ILR  - COURTNEY results today shows LVEF 65-70%  - neuro recs: hold antiplt at least 2 weeks, obtaining f/u CT Head prior to reinitiation, pending clinical course and neurosurgery clearance  - If/when patient cleared to restart antiplatelet therapy AFTER at least 2 weeks and f/u CT Head, would recommend reinitiation of ASA 81mg PO daily AND Clopidogrel 75mg PO daily       # multiple myeloma  - no tx currently  - heme consult req for AC mgmt  - heme recommends Dr. Matias (pt prior provider) to manage AC for multiple myeloma      # Pancytopenia  - since admission, WBC ~2, RBC ~4, plt 140-150  - BM suppression?    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  daughter does not want him to go back to rehab

## 2024-09-27 NOTE — DISCHARGE NOTE PROVIDER - DETAILS OF MALNUTRITION DIAGNOSIS/DIAGNOSES
This patient has been assessed with a concern for Malnutrition and was treated during this hospitalization for the following Nutrition diagnosis/diagnoses:     -  09/26/2024: Severe protein-calorie malnutrition

## 2024-09-27 NOTE — PROGRESS NOTE ADULT - ASSESSMENT
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 Admitted for hemorrhagic conversion of R PCA.    imaging found hemorrhagic conversion 1m after old stroke,  from: MR Head No Cont (09.24.24 @ 20:06) >Subacute infarct in the right posterior cerebral artery vascular territory with hemorrhagic conversion appears grossly unchanged from CT of 09/24/2024.   Patient with Pancytopenic, aFIB W/U,  pending ILR   Patient now presents to [facility] for [facility] TO evaluate cardioembolic source of stroke   CURRENTLY he denies HA. Dizziness, SOB, CP, Abdominal pain    -COURTNEY as ordered  -Labs and ECG reviewed  -Procedure discussed with PATIENT'S DAUGHTER BY anesthesiologist and cradiologist   -Consent obtained by Echocardiographer and anesthesiologist from pt's family

## 2024-09-27 NOTE — PROGRESS NOTE ADULT - SUBJECTIVE AND OBJECTIVE BOX
SUBJECTIVE:    Chief Complaint: Patient is a 84y old  Male who presents with a chief complaint of Right PCA infarct with hemorrhagic conversion (26 Sep 2024 15:08)      INTERVAL HPI/LAST 24HRS EVENTS: Patient seen and examined at bedside at AM. No clinically acute event overnight.   Denies any chest pain, palpitations, SOB, PND, orthopnea, leg edema, N/V/D, or any other complaints.     MEDICATIONS  (STANDING):  atorvastatin 40 milliGRAM(s) Oral at bedtime  cefTRIAXone Injectable. 1000 milliGRAM(s) IV Push every 24 hours  chlorhexidine 2% Cloths 1 Application(s) Topical <User Schedule>  folic acid 1 milliGRAM(s) Oral daily  insulin lispro (ADMELOG) corrective regimen sliding scale   SubCutaneous Before meals and at bedtime  levothyroxine 75 MICROGram(s) Oral daily  metoprolol succinate ER 25 milliGRAM(s) Oral daily  pantoprazole    Tablet 40 milliGRAM(s) Oral before breakfast  senna 2 Tablet(s) Oral at bedtime  tamsulosin 0.4 milliGRAM(s) Oral at bedtime    MEDICATIONS  (PRN):  acetaminophen     Tablet .. 650 milliGRAM(s) Oral every 6 hours PRN Mild Pain (1 - 3), Moderate Pain (4 - 6), Severe Pain (7 - 10)  melatonin 3 milliGRAM(s) Oral at bedtime PRN Insomnia  OLANZapine Injectable 2.5 milliGRAM(s) IntraMuscular every 8 hours PRN agitation      Allergies    No Known Allergies    Intolerances        REVIEW OF SYSTEMS:  CONSTITUTIONAL: No fever, weight loss, or fatigue  RESPIRATORY: No cough, wheezing, chills or hemoptysis; No shortness of breath  CARDIOVASCULAR: No chest pain, palpitations, dizziness, or leg swelling  GASTROINTESTINAL: No abdominal or epigastric pain. No nausea, vomiting, or hematemesis; No diarrhea or constipation. No melena or hematochezia.  NEUROLOGICAL: No headaches, loss of strength, numbness, or tremors  MUSCULOSKELETAL: No joint pain or swelling; No muscle, back, or extremity pain    OBJECTIVE:    Vital Signs Last 24 Hrs  T(C): 37.1 (27 Sep 2024 05:18), Max: 37.2 (26 Sep 2024 20:46)  T(F): 98.8 (27 Sep 2024 05:18), Max: 99 (26 Sep 2024 20:46)  HR: 74 (27 Sep 2024 05:18) (64 - 81)  BP: 133/82 (27 Sep 2024 05:18) (118/75 - 163/74)  BP(mean): --  RR: 16 (27 Sep 2024 05:18) (16 - 18)  SpO2: 92% (27 Sep 2024 05:18) (92% - 100%)    Parameters below as of 27 Sep 2024 05:18  Patient On (Oxygen Delivery Method): room air        PHYSICAL EXAM:  Constitutional: Alert, interactive, comfortable, NAD  Head and Face: Atraumatic, head and face were normal in appearance  Eyes: Sclera and conjunctiva were normal, pupils were equal in size, round, eyelids normal  ENT: Ears and nose were normal in appearance  Neck: Appearance was normal, neck was supple, No JVD  Pulmonary: Clear to auscultation bilaterally, no rales/crackles, or wheezing  Heart: Regular rate and rhythm, no murmurs, gallops, or pericardial rubs  Abdominal: Soft, nontender, nondistended. No appreciable hepatosplenomegaly. Bowel sounds normal  Skin: Normal color and intact without appreciable rash or abnormal skin lesion  Extremities: Warm without edema. No clubbing.  Pulse: 2+ radial pulse  Neuro: Oriented to person, place, and time    Lab/ Imaging:    LABS:                        11.5   2.24  )-----------( 165      ( 27 Sep 2024 05:43 )             35.0     09-27    142  |  104  |  28.8[H]  ----------------------------<  116[H]  3.8   |  20.0[L]  |  1.91[H]    Ca    9.8      27 Sep 2024 05:43  Phos  3.3     09-27  Mg     1.8     09-27    TPro  8.0  /  Alb  3.8  /  TBili  0.6  /  DBili  x   /  AST  28  /  ALT  20  /  AlkPhos  81  09-27      Urinalysis Basic - ( 27 Sep 2024 05:43 )    Color: x / Appearance: x / SG: x / pH: x  Gluc: 116 mg/dL / Ketone: x  / Bili: x / Urobili: x   Blood: x / Protein: x / Nitrite: x   Leuk Esterase: x / RBC: x / WBC x   Sq Epi: x / Non Sq Epi: x / Bacteria: x      CAPILLARY BLOOD GLUCOSE      POCT Blood Glucose.: 109 mg/dL (27 Sep 2024 06:09)  POCT Blood Glucose.: 100 mg/dL (26 Sep 2024 22:01)  POCT Blood Glucose.: 127 mg/dL (26 Sep 2024 17:21)  POCT Blood Glucose.: 129 mg/dL (26 Sep 2024 11:38)  POCT Blood Glucose.: 108 mg/dL (26 Sep 2024 09:05)          Imaging Personally Reviewed:  [ ] YES  [ ] NO    Consultant(s) Notes Reviewed:  [ ] YES  [ ] NO    Care Discussed with Consultants/Other Providers [ ] YES  [ ] NO    Plan of Care discussed with Housestaff [ ]YES [ ] NO SUBJECTIVE:    Chief Complaint: Patient is a 84y old  Male who presents with a chief complaint of Right PCA infarct with hemorrhagic conversion (26 Sep 2024 15:08)    84M w PMHx of multiple myeloma, DM, htn, hld, GERD, 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 Imaging found hemorrhagic conversion of R PCA.    INTERVAL HPI/LAST 24HRS EVENTS: Patient seen and examined at bedside at AM. Received 2.5mg zyprexa @ 8PM due to agitation. Has been retaining urine, needed bladder scans and straight caths with volumes >500mL. UA positive for bacturia.      Denies any chest pain, palpitations, SOB, PND, orthopnea, leg edema, N/V/D, or any other complaints.     MEDICATIONS  (STANDING):  atorvastatin 40 milliGRAM(s) Oral at bedtime  cefTRIAXone Injectable. 1000 milliGRAM(s) IV Push every 24 hours  chlorhexidine 2% Cloths 1 Application(s) Topical <User Schedule>  folic acid 1 milliGRAM(s) Oral daily  insulin lispro (ADMELOG) corrective regimen sliding scale   SubCutaneous Before meals and at bedtime  levothyroxine 75 MICROGram(s) Oral daily  metoprolol succinate ER 25 milliGRAM(s) Oral daily  pantoprazole    Tablet 40 milliGRAM(s) Oral before breakfast  senna 2 Tablet(s) Oral at bedtime  tamsulosin 0.4 milliGRAM(s) Oral at bedtime    MEDICATIONS  (PRN):  acetaminophen     Tablet .. 650 milliGRAM(s) Oral every 6 hours PRN Mild Pain (1 - 3), Moderate Pain (4 - 6), Severe Pain (7 - 10)  melatonin 3 milliGRAM(s) Oral at bedtime PRN Insomnia  OLANZapine Injectable 2.5 milliGRAM(s) IntraMuscular every 8 hours PRN agitation      Allergies    No Known Allergies    Intolerances        REVIEW OF SYSTEMS:  CONSTITUTIONAL: No fever, weight loss, or fatigue  RESPIRATORY: No cough, wheezing, chills or hemoptysis; No shortness of breath  CARDIOVASCULAR: No chest pain, palpitations, dizziness, or leg swelling  GASTROINTESTINAL: No abdominal or epigastric pain. No nausea, vomiting, or hematemesis; No diarrhea or constipation. No melena or hematochezia.  NEUROLOGICAL: No headaches, loss of strength, numbness, or tremors  MUSCULOSKELETAL: No joint pain or swelling; No muscle, back, or extremity pain    OBJECTIVE:    Vital Signs Last 24 Hrs  T(C): 37.1 (27 Sep 2024 05:18), Max: 37.2 (26 Sep 2024 20:46)  T(F): 98.8 (27 Sep 2024 05:18), Max: 99 (26 Sep 2024 20:46)  HR: 74 (27 Sep 2024 05:18) (64 - 81)  BP: 133/82 (27 Sep 2024 05:18) (118/75 - 163/74)  BP(mean): --  RR: 16 (27 Sep 2024 05:18) (16 - 18)  SpO2: 92% (27 Sep 2024 05:18) (92% - 100%)    Parameters below as of 27 Sep 2024 05:18  Patient On (Oxygen Delivery Method): room air        PHYSICAL EXAM:  Constitutional: Alert, interactive, comfortable, NAD  Head and Face: Atraumatic, head and face were normal in appearance  Eyes: Sclera and conjunctiva were normal, pupils were equal in size, round, eyelids normal  ENT: Ears and nose were normal in appearance  Neck: Appearance was normal, neck was supple, No JVD  Pulmonary: Clear to auscultation bilaterally, no rales/crackles, or wheezing  Heart: Regular rate and rhythm, no murmurs, gallops, or pericardial rubs  Abdominal: Soft, nontender, nondistended. No appreciable hepatosplenomegaly. Bowel sounds normal  Skin: Normal color and intact without appreciable rash or abnormal skin lesion  Extremities: Warm without edema. No clubbing.  Pulse: 2+ radial pulse  Neuro: Oriented to person, place, and time    Lab/ Imaging:    LABS:                        11.5   2.24  )-----------( 165      ( 27 Sep 2024 05:43 )             35.0     09-27    142  |  104  |  28.8[H]  ----------------------------<  116[H]  3.8   |  20.0[L]  |  1.91[H]    Ca    9.8      27 Sep 2024 05:43  Phos  3.3     09-27  Mg     1.8     09-27    TPro  8.0  /  Alb  3.8  /  TBili  0.6  /  DBili  x   /  AST  28  /  ALT  20  /  AlkPhos  81  09-27      Urinalysis Basic - ( 27 Sep 2024 05:43 )    Color: x / Appearance: x / SG: x / pH: x  Gluc: 116 mg/dL / Ketone: x  / Bili: x / Urobili: x   Blood: x / Protein: x / Nitrite: x   Leuk Esterase: x / RBC: x / WBC x   Sq Epi: x / Non Sq Epi: x / Bacteria: x      CAPILLARY BLOOD GLUCOSE      POCT Blood Glucose.: 109 mg/dL (27 Sep 2024 06:09)  POCT Blood Glucose.: 100 mg/dL (26 Sep 2024 22:01)  POCT Blood Glucose.: 127 mg/dL (26 Sep 2024 17:21)  POCT Blood Glucose.: 129 mg/dL (26 Sep 2024 11:38)  POCT Blood Glucose.: 108 mg/dL (26 Sep 2024 09:05)          Imaging Personally Reviewed:  [ ] YES  [ ] NO    Consultant(s) Notes Reviewed:  [ ] YES  [ ] NO    Care Discussed with Consultants/Other Providers [ ] YES  [ ] NO    Plan of Care discussed with Housestaff [ ]YES [ ] NO SUBJECTIVE:    Chief Complaint: Patient is a 84y old  Male who presents with a chief complaint of Right PCA infarct with hemorrhagic conversion (26 Sep 2024 15:08)    84M w PMHx of multiple myeloma, DM, htn, hld, GERD, 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 Imaging found hemorrhagic conversion of R PCA.    INTERVAL HPI/LAST 24HRS EVENTS: Patient seen and examined at bedside at AM. Received 2.5mg zyprexa @ 8PM due to agitation. Has been retaining urine, needed bladder scans and straight caths with volumes >500mL. UA positive for bacturia. Started ciprofloxacin 500mg BID.  Pending ILR today if possible, pending cardio.    Denies any chest pain, palpitations, SOB, PND, orthopnea, leg edema, N/V/D, or any other complaints.     MEDICATIONS  (STANDING):  atorvastatin 40 milliGRAM(s) Oral at bedtime  cefTRIAXone Injectable. 1000 milliGRAM(s) IV Push every 24 hours  chlorhexidine 2% Cloths 1 Application(s) Topical <User Schedule>  folic acid 1 milliGRAM(s) Oral daily  insulin lispro (ADMELOG) corrective regimen sliding scale   SubCutaneous Before meals and at bedtime  levothyroxine 75 MICROGram(s) Oral daily  metoprolol succinate ER 25 milliGRAM(s) Oral daily  pantoprazole    Tablet 40 milliGRAM(s) Oral before breakfast  senna 2 Tablet(s) Oral at bedtime  tamsulosin 0.4 milliGRAM(s) Oral at bedtime    MEDICATIONS  (PRN):  acetaminophen     Tablet .. 650 milliGRAM(s) Oral every 6 hours PRN Mild Pain (1 - 3), Moderate Pain (4 - 6), Severe Pain (7 - 10)  melatonin 3 milliGRAM(s) Oral at bedtime PRN Insomnia  OLANZapine Injectable 2.5 milliGRAM(s) IntraMuscular every 8 hours PRN agitation      Allergies    No Known Allergies    Intolerances        REVIEW OF SYSTEMS:  CONSTITUTIONAL: No fever, weight loss, or fatigue  RESPIRATORY: No cough, wheezing, chills or hemoptysis; No shortness of breath  CARDIOVASCULAR: No chest pain, palpitations, dizziness, or leg swelling  GASTROINTESTINAL: No abdominal or epigastric pain. No nausea, vomiting, or hematemesis; No diarrhea or constipation. No melena or hematochezia.  NEUROLOGICAL: No headaches, loss of strength, numbness, or tremors  MUSCULOSKELETAL: No joint pain or swelling; No muscle, back, or extremity pain    OBJECTIVE:    Vital Signs Last 24 Hrs  T(C): 37.1 (27 Sep 2024 05:18), Max: 37.2 (26 Sep 2024 20:46)  T(F): 98.8 (27 Sep 2024 05:18), Max: 99 (26 Sep 2024 20:46)  HR: 74 (27 Sep 2024 05:18) (64 - 81)  BP: 133/82 (27 Sep 2024 05:18) (118/75 - 163/74)  BP(mean): --  RR: 16 (27 Sep 2024 05:18) (16 - 18)  SpO2: 92% (27 Sep 2024 05:18) (92% - 100%)    Parameters below as of 27 Sep 2024 05:18  Patient On (Oxygen Delivery Method): room air        PHYSICAL EXAM:  Constitutional: Alert, interactive, comfortable, NAD  Head and Face: Atraumatic, head and face were normal in appearance  Eyes: Sclera and conjunctiva were normal, pupils were equal in size, round, eyelids normal  ENT: Ears and nose were normal in appearance  Neck: Appearance was normal, neck was supple, No JVD  Pulmonary: Clear to auscultation bilaterally, no rales/crackles, or wheezing  Heart: Regular rate and rhythm, no murmurs, gallops, or pericardial rubs  Abdominal: Soft, nontender, nondistended. No appreciable hepatosplenomegaly. Bowel sounds normal  Skin: Normal color and intact without appreciable rash or abnormal skin lesion  Extremities: Warm without edema. No clubbing.  Pulse: 2+ radial pulse  Neuro: Oriented to person, place, and time    Lab/ Imaging:    LABS:                        11.5   2.24  )-----------( 165      ( 27 Sep 2024 05:43 )             35.0     09-27    142  |  104  |  28.8[H]  ----------------------------<  116[H]  3.8   |  20.0[L]  |  1.91[H]    Ca    9.8      27 Sep 2024 05:43  Phos  3.3     09-27  Mg     1.8     09-27    TPro  8.0  /  Alb  3.8  /  TBili  0.6  /  DBili  x   /  AST  28  /  ALT  20  /  AlkPhos  81  09-27      Urinalysis Basic - ( 27 Sep 2024 05:43 )    Color: x / Appearance: x / SG: x / pH: x  Gluc: 116 mg/dL / Ketone: x  / Bili: x / Urobili: x   Blood: x / Protein: x / Nitrite: x   Leuk Esterase: x / RBC: x / WBC x   Sq Epi: x / Non Sq Epi: x / Bacteria: x      CAPILLARY BLOOD GLUCOSE      POCT Blood Glucose.: 109 mg/dL (27 Sep 2024 06:09)  POCT Blood Glucose.: 100 mg/dL (26 Sep 2024 22:01)  POCT Blood Glucose.: 127 mg/dL (26 Sep 2024 17:21)  POCT Blood Glucose.: 129 mg/dL (26 Sep 2024 11:38)  POCT Blood Glucose.: 108 mg/dL (26 Sep 2024 09:05)          Imaging Personally Reviewed:  [ ] YES  [ ] NO    Consultant(s) Notes Reviewed:  [ ] YES  [ ] NO    Care Discussed with Consultants/Other Providers [ ] YES  [ ] NO    Plan of Care discussed with Housestaff [ ]YES [ ] NO SUBJECTIVE:    Chief Complaint: Patient is a 84y old  Male who presents with a chief complaint of Right PCA infarct with hemorrhagic conversion (26 Sep 2024 15:08)    84M w PMHx of multiple myeloma, DM, htn, hld, GERD, 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 Imaging found hemorrhagic conversion of R PCA.    INTERVAL HPI/LAST 24HRS EVENTS: Patient seen and examined at bedside at AM. Received 2.5mg zyprexa @ 8PM due to agitation. Has been retaining urine, needed bladder scans and straight caths with volumes >500mL. UA positive for bacturia. Started ciprofloxacin 500mg BID.  Pending ILR with COURTNEY planned today.    Denies any chest pain, palpitations, SOB, PND, orthopnea, leg edema, N/V/D, or any other complaints.     MEDICATIONS  (STANDING):  atorvastatin 40 milliGRAM(s) Oral at bedtime  cefTRIAXone Injectable. 1000 milliGRAM(s) IV Push every 24 hours  chlorhexidine 2% Cloths 1 Application(s) Topical <User Schedule>  folic acid 1 milliGRAM(s) Oral daily  insulin lispro (ADMELOG) corrective regimen sliding scale   SubCutaneous Before meals and at bedtime  levothyroxine 75 MICROGram(s) Oral daily  metoprolol succinate ER 25 milliGRAM(s) Oral daily  pantoprazole    Tablet 40 milliGRAM(s) Oral before breakfast  senna 2 Tablet(s) Oral at bedtime  tamsulosin 0.4 milliGRAM(s) Oral at bedtime    MEDICATIONS  (PRN):  acetaminophen     Tablet .. 650 milliGRAM(s) Oral every 6 hours PRN Mild Pain (1 - 3), Moderate Pain (4 - 6), Severe Pain (7 - 10)  melatonin 3 milliGRAM(s) Oral at bedtime PRN Insomnia  OLANZapine Injectable 2.5 milliGRAM(s) IntraMuscular every 8 hours PRN agitation      Allergies    No Known Allergies    Intolerances        REVIEW OF SYSTEMS:  CONSTITUTIONAL: No fever, weight loss, or fatigue  RESPIRATORY: No cough, wheezing, chills or hemoptysis; No shortness of breath  CARDIOVASCULAR: No chest pain, palpitations, dizziness, or leg swelling  GASTROINTESTINAL: No abdominal or epigastric pain. No nausea, vomiting, or hematemesis; No diarrhea or constipation. No melena or hematochezia.  NEUROLOGICAL: No headaches, loss of strength, numbness, or tremors  MUSCULOSKELETAL: No joint pain or swelling; No muscle, back, or extremity pain    OBJECTIVE:    Vital Signs Last 24 Hrs  T(C): 37.1 (27 Sep 2024 05:18), Max: 37.2 (26 Sep 2024 20:46)  T(F): 98.8 (27 Sep 2024 05:18), Max: 99 (26 Sep 2024 20:46)  HR: 74 (27 Sep 2024 05:18) (64 - 81)  BP: 133/82 (27 Sep 2024 05:18) (118/75 - 163/74)  BP(mean): --  RR: 16 (27 Sep 2024 05:18) (16 - 18)  SpO2: 92% (27 Sep 2024 05:18) (92% - 100%)    Parameters below as of 27 Sep 2024 05:18  Patient On (Oxygen Delivery Method): room air        PHYSICAL EXAM:  Constitutional: Alert, interactive, comfortable, NAD  Head and Face: Atraumatic, head and face were normal in appearance  Eyes: Sclera and conjunctiva were normal, pupils were equal in size, round, eyelids normal  ENT: Ears and nose were normal in appearance  Neck: Appearance was normal, neck was supple, No JVD  Pulmonary: Clear to auscultation bilaterally, no rales/crackles, or wheezing  Heart: Regular rate and rhythm, no murmurs, gallops, or pericardial rubs  Abdominal: Soft, nontender, nondistended. No appreciable hepatosplenomegaly. Bowel sounds normal  Skin: Normal color and intact without appreciable rash or abnormal skin lesion  Extremities: Warm without edema. No clubbing.  Pulse: 2+ radial pulse  Neuro: Oriented to person, place, and time    Lab/ Imaging:    LABS:                        11.5   2.24  )-----------( 165      ( 27 Sep 2024 05:43 )             35.0     09-27    142  |  104  |  28.8[H]  ----------------------------<  116[H]  3.8   |  20.0[L]  |  1.91[H]    Ca    9.8      27 Sep 2024 05:43  Phos  3.3     09-27  Mg     1.8     09-27    TPro  8.0  /  Alb  3.8  /  TBili  0.6  /  DBili  x   /  AST  28  /  ALT  20  /  AlkPhos  81  09-27      Urinalysis Basic - ( 27 Sep 2024 05:43 )    Color: x / Appearance: x / SG: x / pH: x  Gluc: 116 mg/dL / Ketone: x  / Bili: x / Urobili: x   Blood: x / Protein: x / Nitrite: x   Leuk Esterase: x / RBC: x / WBC x   Sq Epi: x / Non Sq Epi: x / Bacteria: x      CAPILLARY BLOOD GLUCOSE      POCT Blood Glucose.: 109 mg/dL (27 Sep 2024 06:09)  POCT Blood Glucose.: 100 mg/dL (26 Sep 2024 22:01)  POCT Blood Glucose.: 127 mg/dL (26 Sep 2024 17:21)  POCT Blood Glucose.: 129 mg/dL (26 Sep 2024 11:38)  POCT Blood Glucose.: 108 mg/dL (26 Sep 2024 09:05)          Imaging Personally Reviewed:  [ ] YES  [ ] NO    Consultant(s) Notes Reviewed:  [ ] YES  [ ] NO    Care Discussed with Consultants/Other Providers [ ] YES  [ ] NO    Plan of Care discussed with Housestaff [ ]YES [ ] NO

## 2024-09-27 NOTE — DISCHARGE NOTE PROVIDER - HOSPITAL COURSE
84M w PMHx of multiple myeloma, DM, htn, hld, GERD, occipital infarct (8/24/24) presented from rehab after a headstrike. Imaging found that a prior stroke (from his most recent hospitalization in August 2024) had a hemorrhagic conversion (R PCA). He also began to develop acute metabolic encephalopathy / delirium. A few days into his hospitalization, his daughter suggested he may have a UTI.   His urinalaysis showed positive for UTI and urine culture showed likely contamination with 3+ organisms. UA was positive so he started ceftriaxone. D/c on cefpodoxime.    Imaging found hemorrhagic conversion in R posterior cerebral artery  Neurologic exam showed an NIHSS of 4 with 2 points for visual field (complete L hemianopia) and 2 points for orientation.   He had been on olanzapine and seraquil for hospital acquired delirium. Both medications can be stopped prior to d/c.    Neurosurgery recs no AC/AP until cleared with neurosurgery & f/u CT Head. After clearance, restart asa 81mg PO qd + clopidogrel 75mg PO qd. s/p ILR: monitor.   Follow up outpt with cardiologist & neurologist     Multiple myeloma with bicytopenia  Since admission, WBC ~2, plt 140-150  Heme recommends Dr. 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   Hypothyroid - levothyroxine 84M w PMHx of multiple myeloma, DM, htn, hld, GERD, occipital infarct (8/24/24) presented from rehab after a head strike. Imaging found that a prior stroke (from his most recent hospitalization in August 2024) had a hemorrhagic conversion (R PCA). He also began to develop acute metabolic encephalopathy / delirium. A few days into his hospitalization, his daughter suggested he may have a UTI.  His urinalaysis showed positive for UTI and urine culture showed likely contamination with 3+ organisms. UA was positive so he started ceftriaxone. D/c on cefpodoxime.  Imaging found hemorrhagic conversion in R posterior cerebral artery. Neurologic exam showed an NIHSS of 4 with 2 points for visual field (complete L hemianopia) and 2 points for orientation. He had been on olanzapine and seraquil for hospital acquired delirium. Both medications can be stopped prior to d/c. Neurosurgery recs no AC/AP until cleared with neurosurgery & f/u CT Head. After clearance, restart asa 81mg PO qd + clopidogrel 75mg PO qd. s/p ILR: monitor. Follow up outpatient with cardiologist & neurologist  Pt also has a history of multiple myeloma with bicytopenia. Since admission, WBC ~2, plt 140-150. Heme recommends Dr. Matias (pt prior provider) to manage AC for multiple myeloma.  Pt developed CRISTO on CKD stage 3. His baseline Cr is 1.3-1.6.   Currently has slightly elevated Cr, but it is improving with IV fluids, metoprolol for htn, levothyroxine for hypothyroidism.   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 84M w PMHx of multiple myeloma, DM, htn, hld, GERD, occipital infarct (8/24/24) presented from rehab after a head strike. Imaging found that a prior stroke (from his most recent hospitalization in August 2024) had a hemorrhagic conversion (R PCA). Imaging found hemorrhagic conversion in R posterior cerebral artery. Neurologic exam showed an NIHSS of 4 with 2 points for visual field (complete L hemianopia) and 2 points for orientation. His antiplatelets were discontinued at admission and he was continued on statin. HE was evaluated by Neurosurgery with recommendations for follow up CT head in 2 weeks prior to resumption of antiplatelets. ILR was placed.    He also began to develop acute metabolic encephalopathy / delirium. His urinalaysis showed positive for UTI and urine culture showed likely contamination with 3+ organisms. UA was positive so he started ceftriaxone. D/c on cefpodoxime.  He had been on olanzapine and Seroquel for hospital acquired delirium.       Pt also has a history of multiple myeloma with bicytopenia. Since admission, WBC ~2, plt 140-150. Heme recommends Dr. Matias (pt prior provider) to manage AC for multiple myeloma. Pt developed CRISTO on CKD stage 3 present upon admission. His baseline Cr is 1.3-1.6.  Currently has slightly elevated Cr, but it is improving with IV fluids, metoprolol for htn, levothyroxine for hypothyroidism. 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      Evaluated by PT and OT, family wants to take patient home with care

## 2024-09-27 NOTE — PROGRESS NOTE ADULT - TIME BILLING
patient care , labs ,meds, chart review
patient  care , labs ,meds, chart review
as above
patient care , labs ,meds, chart review

## 2024-09-27 NOTE — DISCHARGE NOTE PROVIDER - NSDCHHATTENDCERT_GEN_ALL_CORE
Yes
My signature below certifies that the above stated patient is homebound and upon completion of the Face-To-Face encounter, has the need for intermittent skilled nursing, physical therapy and/or speech or occupational therapy services in their home for their current diagnosis as outlined in their initial plan of care. These services will continue to be monitored by myself or another physician.

## 2024-09-27 NOTE — PROGRESS NOTE ADULT - ASSESSMENT
ASSESSMENT:   Patient is a pleasant 85 y/o Male with a PMHx of Right occipital infarct on 8/24/24 now on Plavix for one week, DM, HTN, HLD, GERD, who presented to [facility] ED on 9/23/24 following a fall at nursing home around 3am that morning. He reported going to the bathroom, feeling wobbly and then falling.  He reported he occasionally experiences similar wobbling symptoms.  He endorsed a head strike and has a linear abrasion across his forehead with a small hematoma.  He denies LOC.  The patient's daughter reported hearing about the fall from his nursing home that morning and then called the ambulance after consulting with the Pt's neurologist. He is AOx1 with Left visual deficits which the daughter reports is his baseline since his stroke in August.  He denies pain anywhere except mild soreness in his Right trapezius.     Ischemic stroke from ~1 month ago with hemorrhagic transformation. Discharge paperwork from prior hospitalization obtained as daughter brought in, chart reviewed.  Per paperwork, patient was hospitalized at [facility], originally presented on 8/24/24 after tripping over e-scooter, found to have acute Right occipital infarct with NO hemorrhagic transformation. 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 Cardiology c/s due to PVCs in hospital.    IMPRESSION: Right occipital infarction with hemorrhagic transformation. Etiology of stroke not stated in previous paperwork however concern for symptomatic intracranial atherosclerosis.  Timeline and/or precipitating events around hemorrhagic transformation unclear at this time. No notation in paperwork regarding when/why patient started on Plavix monotherapy (was started ?~1 week ago). Reviewed MR, hemorrhagic transformation appears late subacute.    Discussed previous hospitalization and current hospitalization at length with patient's daughter, Dionne, via telephone. Per Dionne, patient was taking ASA 81mg PO daily prior to stroke 8/24/24, and cardiologist felt he "failed" ASA, so he was taken off ASA and put on Plavix 75mg PO daily. Dionne stated no neurological imaging was performed to her knowledge between discharge from [facility] until current hospital admission.     Per Dionne, patient was discharged from [facility] with external heart monitor however it required charging every 3-4 days and Dionne stated the rehab was not charging battery; when patient arrived to ED, Dionne removed external heart monitor and has it in her possession, states it was on for maybe 1-2 weeks. Dionne is agreeable to either MCOT or ILR, pending medicine discussion with patient's PCP/Cardiologist Dr. Taqueria Ariza per her request. She states she typically looks to Dr. Ariza for guidance in her father's care and will do whichever he thinks is best. Discussed both MCOT and ILR with Dionne, and relayed that even if patient were to have indication for anticoagulation, further discussion would need to be had regarding risk vs benefit. Contact info for Dr. Ariza reported to Medicine resident Dr. Awan. Also discussed patient's current dx of smoldering multiple myeloma, patient's oncologist is Dr. Wilmer Matias.       NEURO:   -Neurologically patient at baseline per daughter , now being treated for UTI which could suggest the acute intermittent episodes of agitation the past two days.   -Continue close monitoring for neurologic deterioration    -Stroke neuro checks q 4 hours   -SBP goal normotension, avoiding rapid fluctuations in the setting of atherosclerotic disease  -ANTITHROMBOTIC THERAPY: Recommend holding antiplatelets for at LEAST 2 weeks, obtaining f/u CT Head prior to reinitiation, pending clinical course and neurosurgery clearance. If/when patient cleared to restart antiplatelet therapy AFTER at least 2 weeks and f/u CT Head, would recommend reinitiation of ASA 81mg PO daily AND Clopidogrel 75mg PO daily   -STATIN THERAPY: Atorvastatin 40mg PO daily, LDL 45, titrate statin to LDL goal less than 70  -HgA1C 6.1  -MRI Brain w/o as noted  -Dysphagia screen: PASS  -Physical therapy/OT/Speech eval/treatment  -Heme/onc f/u in the setting of smoldering multiple myeloma to r/o underlying hypercoagulable state   -TTE as noted, EF 60%, no evidence of cardiac source of stroke at this time  -Cardiac monitoring w/ telemetry for now  -Planning for COURTNEY with ILR today, 9/27/24.  -Long-term cardiac event monitoring to assess for underlying afib, MCOT vs ILR  -DVT ppx: Heparin s.c [] LMWH [] SCD[x]    -Na Goal: 135-145   -Monitor for si/sx of infection   -Stroke education     OTHER: Condition and plan of care d/w patient, questions and concerns addressed.     DISPOSITION:   Dionne states she would like to bring her father home with home care and home therapies after discharge, does not have an interest in rehab at this time    CORE MEASURES:        Admission NIHSS: 3     Tenecteplase : [] YES [x] NO      LDL/A1C: 45/6.1     Depression Screen- if depression hx and/or present      Statin Therapy: Atorvastatin 40mg      Dysphagia Screen: [x] PASS [] FAIL     Smoking [] YES [x] NO      Afib [] YES [x] NO     Stroke Education [] YES [] NO [x] PENDING

## 2024-09-27 NOTE — DISCHARGE NOTE PROVIDER - CARE PROVIDER_API CALL
Aki Esposito  Neurology  [address], [place] [address]  Phone: [phone]  Fax: [phone]  Follow Up Time: 1 week    Jesus Parks  Cardiovascular Disease  [address] [place] [address]  Phone: [phone]  Fax: [phone]  Follow Up Time: 1 week    Taqueria Ariza  Cardiology  [address] [place] [address], [address] [address]  Phone: [phone]  Fax: [phone]  Follow Up Time: 2 weeks

## 2024-09-27 NOTE — PROGRESS NOTE ADULT - ATTENDING COMMENTS
ams / likely multifactorial  Delirium due to cva / hospital stay / uti   started on ceftriaxone   f/u ucxs , will need 1-14 days course for complicated uti   plan for COURTNEY / and or ILR   follow up with neurology/neurosurgery as op for repeat imaging after 2 weeks and possible starting anti plts if cleared by neuro/ neuro sx   plan for eventual dc home with home care , home aides , daughter wishes dc prior to monday as she has appointment for home aides visit   discussed with daughter at bedside

## 2024-09-27 NOTE — DISCHARGE NOTE PROVIDER - NSDCQMPATIENTREHAB_NEU_A_CORE
-- DO NOT REPLY / DO NOT REPLY ALL --  -- Message is from Engagement Center Operations (ECO) --    ONLY TO BE USED WITHIN A REFILL MEDICATION ENCOUNTER    Med Refill  Is the patient currently having any symptoms?: No/Non-Emergent symptoms    Name of medication requested: See pended med    Is this the first request for the medication in the last 48 hours?: Yes      Patient is requesting a medication refill - medication is on active list    Full name of the provider who ordered the medication: Dr. Nasim Box    [facility] site name / Account # for provider: [facility]    Preferred Pharmacy: Pharmacy  [id number] [address]    Patient confirmed the above pharmacy as correct?  Yes    Caller Information         Type Contact Phone/Fax    05/08/2024 10:25 AM CDT Phone (Incoming) Valerie Euceda (Self) [phone] (M)            Alternative phone number: None    Can a detailed message be left?: Yes         Assessment performed

## 2024-09-27 NOTE — PROGRESS NOTE ADULT - SUBJECTIVE AND OBJECTIVE BOX
Preliminary note, official recommendations pending attending review/signature   Seen and examined by Stroke team attending/team, assessment/ plan as discussed with stroke team attending/team as noted.     [facility] Stroke Team  Progress Note     HPI:  Patient is a pleasant 85 y/o Male with a PMHx of Right occipital infarct on 8/24/24 now on Plavix for one week, DM, HTN, HLD, GERD, who presented to [facility] ED on 9/23/24 following a fall at nursing home around 3am that morning. He reported going to the bathroom, feeling wobbly and then falling.  He reported he occasionally experiences similar wobbling symptoms.  He endorsed a headstrike and has a linear abrasion across his forehead with a small hematoma.  He denies LOC.  The patient's daughter reported hearing about the fall from his nursing home that morning and then called the ambulance after consulting with the Pt's neurologist. He is AOx1 with Left visual deficits which the daughter reports is his baseline since his stroke in August.  He denies pain anywhere except mild soreness in his Right trapezius.     SUBJECTIVE: Overnight received Zyprexa @2000 for acute agitation.  No new neurologic complaints.  ROS reported negative unless otherwise noted.    acetaminophen     Tablet .. 650 milliGRAM(s) Oral every 6 hours PRN  atorvastatin 40 milliGRAM(s) Oral at bedtime  cefTRIAXone Injectable. 1000 milliGRAM(s) IV Push every 24 hours  chlorhexidine 2% Cloths 1 Application(s) Topical <User Schedule>  folic acid 1 milliGRAM(s) Oral daily  insulin lispro (ADMELOG) corrective regimen sliding scale   SubCutaneous Before meals and at bedtime  levothyroxine 75 MICROGram(s) Oral daily  magnesium sulfate  IVPB 2 Gram(s) IV Intermittent once  melatonin 3 milliGRAM(s) Oral at bedtime PRN  metoprolol succinate ER 25 milliGRAM(s) Oral daily  OLANZapine Injectable 2.5 milliGRAM(s) IntraMuscular every 8 hours PRN  pantoprazole    Tablet 40 milliGRAM(s) Oral before breakfast  senna 2 Tablet(s) Oral at bedtime  sodium chloride 0.9%. 1000 milliLiter(s) IV Continuous <Continuous>  tamsulosin 0.4 milliGRAM(s) Oral at bedtime      PHYSICAL EXAM:   Vital Signs Last 24 Hrs  T(C): 36.7 (27 Sep 2024 08:25), Max: 37.2 (26 Sep 2024 20:46)  T(F): 98.1 (27 Sep 2024 08:25), Max: 99 (26 Sep 2024 20:46)  HR: 42 (27 Sep 2024 12:29) (42 - 81)  BP: 140/71 (27 Sep 2024 12:29) (128/74 - 163/74)  BP(mean): --  RR: 18 (27 Sep 2024 12:29) (16 - 18)  SpO2: 95% (27 Sep 2024 12:29) (92% - 100%)    Parameters below as of 27 Sep 2024 12:29  Patient On (Oxygen Delivery Method): room air      General: No acute distress. Extremely limited exam, received Zyprexa @2000 last night.      NEUROLOGICAL EXAM:  Mental status: The patient is seen laying in bed , sleeping, awakens slightly to name but very sleepy. Does not participate with exam.   Cranial nerves: limited exam today , deferred  Motor: deferred  Sensation: deferred  Cerebellar: deferred  Gait: Deferred        LABS:                        11.5   2.24  )-----------( 165      ( 27 Sep 2024 05:43 )             35.0    09-27    142  |  104  |  28.8[H]  ----------------------------<  116[H]  3.8   |  20.0[L]  |  1.91[H]    Ca    9.8      27 Sep 2024 05:43  Phos  3.3     09-27  Mg     1.8     09-27    TPro  8.0  /  Alb  3.8  /  TBili  0.6  /  DBili  x   /  AST  28  /  ALT  20  /  AlkPhos  81  09-27    LDL 45  HgbA1c 6.1        RADIOLOGY & ADDITIONAL STUDIES (independently reviewed unless otherwise noted):  MR Head No Cont (09.24.24 @ 20:06)  IMPRESSION:  Subacute infarct in the right posterior cerebral artery vascular   territory with hemorrhagic conversion appears grossly unchanged from CT   of 09/24/2024.    CT Head No Cont (09.24.24 @ 14:57)  IMPRESSION:  1.  No significant change, without interval rebleeding.    TTE W or WO Ultrasound Enhancing Agent (09.24.24 @ 09:29)  CONCLUSIONS:   1. Left ventricular systolic function is normal with an ejection fraction of 60 % by Ro's method of disks.   2. There is no evidence of a left ventricular thrombus.  3. Basal inferolateral segment is abnormal.   4. Normal right ventricular cavity size and normal right ventricular systolic function.   5. Moderate aortic regurgitation.   6. Estimated pulmonary artery systolic pressure is 23 mmHg, consistent with normal pulmonary artery pressure.   7. No prior echocardiogram is available for comparison.   8. No pericardial effusion seen.    CT Brain Stroke Protocol (09.23.24 @ 13:53)  IMPRESSION:  1.  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    CT Head No Cont, CT Cervical Spine (09.23.24 @ 21:12)  IMPRESSION:  CT HEAD:  Stable appearing hemorrhagic infarct within the right PCA territory,   compared with earlier examination also performed today at approximately   1:50 PM.    CT CERVICAL SPINE:  No acute fracture or traumatic subluxation.    Multi-level degenerative changes, as described level by level in detail   above.  If there is clinical concern for myelopathy or radiculopathy,   consider further evaluation via MR imaging of the cervical spine,   provided the patient has no contraindications.

## 2024-09-27 NOTE — DISCHARGE NOTE PROVIDER - NSDCMRMEDTOKEN_GEN_ALL_CORE_FT
acetaminophen 325 mg oral tablet: 2 tab(s) orally every 4 hours, As needed, Temp greater or equal to 38.5C (101.3F)  atorvastatin 80 mg oral tablet: 1 tab(s) orally once a day (at bedtime)  clopidogrel: 75 milligram(s) orally once a day  folic acid 1 mg oral tablet: 1 tab(s) orally once a day  levothyroxine 75 mcg (0.075 mg) oral tablet: 1 tab(s) orally once a day  metFORMIN 500 mg oral tablet: 500 milligram(s) orally once a day  metoprolol succinate 25 mg oral tablet, extended release: 1 tab(s) orally once a day  pantoprazole 40 mg oral delayed release tablet: 1 tab(s) orally once a day  tamsulosin 0.4 mg oral capsule: 1 cap(s) orally once a day (at bedtime)   acetaminophen 325 mg oral tablet: 2 tab(s) orally every 4 hours, As needed, Temp greater or equal to 38.5C (101.3F)  atorvastatin 80 mg oral tablet: 1 tab(s) orally once a day (at bedtime)  cefpodoxime 200 mg oral tablet: 1 tab(s) orally 2 times a day  folic acid 1 mg oral tablet: 1 tab(s) orally once a day  levothyroxine 75 mcg (0.075 mg) oral tablet: 1 tab(s) orally once a day  metFORMIN 500 mg oral tablet: 500 milligram(s) orally once a day  metoprolol succinate 25 mg oral tablet, extended release: 1 tab(s) orally once a day  pantoprazole 40 mg oral delayed release tablet: 1 tab(s) orally once a day  tamsulosin 0.4 mg oral capsule: 1 cap(s) orally once a day (at bedtime)

## 2024-09-27 NOTE — PROGRESS NOTE ADULT - ASSESSMENT
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 Admitted for hemorrhagic conversion of R PCA.    imaging found hemorrhagic conversion 1m after old stroke,  from: MR Head No Cont (09.24.24 @ 20:06) >Subacute infarct in the right posterior cerebral artery vascular territory with hemorrhagic conversion appears grossly unchanged from CT of 09/24/2024.   Patient with Pancytopenic, aFIB W/U,  pending ILR   Patient now presents to [facility] for COURTNEY TO evaluate cardioembolic source of stroke   CURRENTLY he denies HA. Dizziness, SOB, CP, Abdominal pain     < from: COURTNEY W or WO Ultrasound Enhancing Agent (09.27.24 @ 13:49) >   1. Left ventricular systolic function is normal with an ejection fraction visually estimated at 65 to 70 %.   2. Mild left ventricular hypertrophy.   3. Normal right ventricular cavity size and normal right ventricular systolic function.   4. Pulmonary artery systolic pressure could not be estimated.   5. Normal left and right atrial size.   6. No evidence of left atrial or left atrial appendage thrombus. The left atrial appendage emptying velocity is low.   7. Mild aortic regurgitation.   8. Ascending aorta is dilated, measuring 3.90 cm (indexed 2.16 cm/m²). No aortic arch atheroma.   9. The interatrial septum appears intact.  10. Agitated saline injection was negative for intracardiac shunt.  11. No pericardial effusion seen.  12. No obvious cardiogenic source of emboli.    < end of copied text >      -Post COURTNEY management per protocol  -follow up with cardiologist in 2 weeks  -follow a soft, bland diet for the next 4 hours, avoiding hot, spicy, crunchy foods  -TRANSFER PT TO TELE UNIT when  stable

## 2024-09-27 NOTE — PROGRESS NOTE ADULT - SUBJECTIVE AND OBJECTIVE BOX
Patient underwent successful and uncomplicated ILR insertion post COURTNEY  Loop Recorder Incision Care:     - Remove the plastic and gauze dressing after 24 hours.   - Do not touch the incision until it is completely healed.   - There is Dermabond (skin glue) on your incision, which will start to flake off on its own over the next 2-3 weeks. Do not pick at or peal off the Dermabond.   - Do not apply soaps, creams, lotions, ointments or powders to the incision until it is completely healed.  - You should call the doctor if you notice redness, drainage, swelling, increased tenderness, hot sensation around the  incision, bleeding or incision edges pulling apart.  - Wound check arranged with EP in two weeks time.

## 2024-09-27 NOTE — DISCHARGE NOTE PROVIDER - NSDCCPCAREPLAN_GEN_ALL_CORE_FT
PRINCIPAL DISCHARGE DIAGNOSIS  Diagnosis: Acute hemorrhagic infarction of brain  Assessment and Plan of Treatment:       SECONDARY DISCHARGE DIAGNOSES  Diagnosis: Status post placement of implantable loop recorder  Assessment and Plan of Treatment: - Remove the plastic and gauze dressing after 24 hours.   - Do not touch the incision until it is completely healed.   - There is Dermabond (skin glue) on your incision, which will start to flake off on its own over the next 2-3 weeks. Do not pick at or peal off the Dermabond.   - Do not apply soaps, creams, lotions, ointments or powders to the incision until it is completely healed.  - You should call the doctor if you notice redness, drainage, swelling, increased tenderness, hot sensation around the  incision, bleeding or incision edges pulling apart.  - Wound check arranged with EP in two weeks time.     PRINCIPAL DISCHARGE DIAGNOSIS  Diagnosis: Acute hemorrhagic infarction of brain  Assessment and Plan of Treatment: You presented with a fall on the day of discharge from your rehab center that you were at for an ischemic stroke in 8/24/24. Your imaging showed that the right PCA (posterior cerebral artery) had bled. We stopped your anticoagulation medications, and we recommend these are stopped until you are cleared by the neurologist. You should follow up with your neurologist and have imaging done to ensure stabilization of the hemorrhage. If so, you may continue aspirin and plavix as your antiplatelet medications.      SECONDARY DISCHARGE DIAGNOSES  Diagnosis: Status post placement of implantable loop recorder  Assessment and Plan of Treatment: You received an ILR (implantable loop recorder) to assess for atrial fibrillation. You had a stroke, and a source for strokes are embolisms, which can occur in patients with atrial fibrillation. The ILR will help us identify if your heart has periods of irregular rhythm. This may be an explanation for your stroke risk. Thus, please follow the instructions below to prevent complications with the device implant for monitoring: Remove the plastic and gauze dressing after 24 hours. Do not touch the incision until it is completely healed. There is Dermabond (skin glue) on your incision, which will start to flake off on its own over the next 2-3 weeks. Do not pick at or peal off the Dermabond. Do not apply soaps, creams, lotions, ointments or powders to the incision until it is completely healed. You should call the doctor if you notice redness, drainage, swelling, increased tenderness, hot sensation around the incision, bleeding or incision edges pulling apart. Wound check arranged with EP in two weeks time.    Diagnosis: Acute metabolic encephalopathy  Assessment and Plan of Treatment: You were acutely encephalopathic. We found you had a urinary tract infection, and we started you on IV (intravenous) antibiotics. We believe that the cause of your encephalopathy was the UTI and the hospitalization itself, as hospital-acquired delirium is a prevalent issue in elderly patients. You were able to be re-oriented, you followed commands, and we went on a walk together to show you that you were in a hospital. We hope your recovery improves at home with rest and familiarity.    Diagnosis: Ischemic stroke  Assessment and Plan of Treatment: You had a prior stroke in August 2024. You have a left visual field deficit.    Diagnosis: Multiple myeloma  Assessment and Plan of Treatment: You have had bicytopenia since admission, with WBC (white blood cell) counts of 2, platelets of 140-150. Hematology recommended Dr. Matias (your prior provider) to manage your multiple myeloma in the setting of hemorrhagic conversion of ischemic stroke.  Please follow up with Dr. Matias for additional treatment and coagulation profile workup.    Diagnosis: CRISTO (acute kidney injury)  Assessment and Plan of Treatment: You had a slight elevation in your Cr, while your baseline is 1.3-1.6. You improved with intravenous fluids.    Diagnosis: HTN (hypertension)  Assessment and Plan of Treatment: We continued your home medication metoprolol.    Diagnosis: Hypothyroid  Assessment and Plan of Treatment: We continued your home medication levothyroxine.

## 2024-09-28 LAB
ALBUMIN SERPL ELPH-MCNC: 3.2 G/DL — LOW (ref 3.3–5.2)
ALP SERPL-CCNC: 69 U/L — SIGNIFICANT CHANGE UP (ref 40–120)
ALT FLD-CCNC: 17 U/L — SIGNIFICANT CHANGE UP
ANION GAP SERPL CALC-SCNC: 13 MMOL/L — SIGNIFICANT CHANGE UP (ref 5–17)
AST SERPL-CCNC: 20 U/L — SIGNIFICANT CHANGE UP
BASOPHILS # BLD AUTO: 0.02 K/UL — SIGNIFICANT CHANGE UP (ref 0–0.2)
BASOPHILS NFR BLD AUTO: 1.1 % — SIGNIFICANT CHANGE UP (ref 0–2)
BILIRUB SERPL-MCNC: 0.5 MG/DL — SIGNIFICANT CHANGE UP (ref 0.4–2)
BUN SERPL-MCNC: 28.3 MG/DL — HIGH (ref 8–20)
CALCIUM SERPL-MCNC: 8.9 MG/DL — SIGNIFICANT CHANGE UP (ref 8.4–10.5)
CHLORIDE SERPL-SCNC: 108 MMOL/L — SIGNIFICANT CHANGE UP (ref 96–108)
CO2 SERPL-SCNC: 19 MMOL/L — LOW (ref 22–29)
CREAT SERPL-MCNC: 1.56 MG/DL — HIGH (ref 0.5–1.3)
EGFR: 44 ML/MIN/1.73M2 — LOW
EOSINOPHIL # BLD AUTO: 0.08 K/UL — SIGNIFICANT CHANGE UP (ref 0–0.5)
EOSINOPHIL NFR BLD AUTO: 4.2 % — SIGNIFICANT CHANGE UP (ref 0–6)
GLUCOSE BLDC GLUCOMTR-MCNC: 130 MG/DL — HIGH (ref 70–99)
GLUCOSE BLDC GLUCOMTR-MCNC: 132 MG/DL — HIGH (ref 70–99)
GLUCOSE BLDC GLUCOMTR-MCNC: 194 MG/DL — HIGH (ref 70–99)
GLUCOSE BLDC GLUCOMTR-MCNC: 86 MG/DL — SIGNIFICANT CHANGE UP (ref 70–99)
GLUCOSE SERPL-MCNC: 68 MG/DL — LOW (ref 70–99)
HCT VFR BLD CALC: 31.9 % — LOW (ref 39–50)
HGB BLD-MCNC: 10.6 G/DL — LOW (ref 13–17)
IMM GRANULOCYTES NFR BLD AUTO: 0 % — SIGNIFICANT CHANGE UP (ref 0–0.9)
LYMPHOCYTES # BLD AUTO: 0.94 K/UL — LOW (ref 1–3.3)
LYMPHOCYTES # BLD AUTO: 49.7 % — HIGH (ref 13–44)
MAGNESIUM SERPL-MCNC: 2.2 MG/DL — SIGNIFICANT CHANGE UP (ref 1.6–2.6)
MCHC RBC-ENTMCNC: 29.9 PG — SIGNIFICANT CHANGE UP (ref 27–34)
MCHC RBC-ENTMCNC: 33.2 GM/DL — SIGNIFICANT CHANGE UP (ref 32–36)
MCV RBC AUTO: 90.1 FL — SIGNIFICANT CHANGE UP (ref 80–100)
MONOCYTES # BLD AUTO: 0.31 K/UL — SIGNIFICANT CHANGE UP (ref 0–0.9)
MONOCYTES NFR BLD AUTO: 16.4 % — HIGH (ref 2–14)
NEUTROPHILS # BLD AUTO: 0.54 K/UL — LOW (ref 1.8–7.4)
NEUTROPHILS NFR BLD AUTO: 28.6 % — LOW (ref 43–77)
PHOSPHATE SERPL-MCNC: 3.9 MG/DL — SIGNIFICANT CHANGE UP (ref 2.4–4.7)
PLATELET # BLD AUTO: 160 K/UL — SIGNIFICANT CHANGE UP (ref 150–400)
POTASSIUM SERPL-MCNC: 3.7 MMOL/L — SIGNIFICANT CHANGE UP (ref 3.5–5.3)
POTASSIUM SERPL-SCNC: 3.7 MMOL/L — SIGNIFICANT CHANGE UP (ref 3.5–5.3)
PROT SERPL-MCNC: 6.9 G/DL — SIGNIFICANT CHANGE UP (ref 6.6–8.7)
RBC # BLD: 3.54 M/UL — LOW (ref 4.2–5.8)
RBC # FLD: 12.5 % — SIGNIFICANT CHANGE UP (ref 10.3–14.5)
SODIUM SERPL-SCNC: 140 MMOL/L — SIGNIFICANT CHANGE UP (ref 135–145)
WBC # BLD: 1.89 K/UL — LOW (ref 3.8–10.5)
WBC # FLD AUTO: 1.89 K/UL — LOW (ref 3.8–10.5)

## 2024-09-28 PROCEDURE — 93010 ELECTROCARDIOGRAM REPORT: CPT

## 2024-09-28 PROCEDURE — 99232 SBSQ HOSP IP/OBS MODERATE 35: CPT

## 2024-09-28 RX ORDER — QUETIAPINE FUMARATE 50 MG/1
25 TABLET, FILM COATED ORAL AT BEDTIME
Refills: 0 | Status: DISCONTINUED | OUTPATIENT
Start: 2024-09-28 | End: 2024-09-29

## 2024-09-28 RX ADMIN — Medication 0.4 MILLIGRAM(S): at 21:30

## 2024-09-28 RX ADMIN — Medication 100 MILLILITER(S): at 05:26

## 2024-09-28 RX ADMIN — PANTOPRAZOLE SODIUM 40 MILLIGRAM(S): 40 TABLET, DELAYED RELEASE ORAL at 05:25

## 2024-09-28 RX ADMIN — ATORVASTATIN CALCIUM 40 MILLIGRAM(S): 10 TABLET, FILM COATED ORAL at 21:30

## 2024-09-28 RX ADMIN — Medication 100 MILLILITER(S): at 12:51

## 2024-09-28 RX ADMIN — Medication 1: at 12:54

## 2024-09-28 RX ADMIN — Medication 75 MICROGRAM(S): at 05:25

## 2024-09-28 RX ADMIN — QUETIAPINE FUMARATE 25 MILLIGRAM(S): 50 TABLET, FILM COATED ORAL at 21:30

## 2024-09-28 RX ADMIN — Medication 1000 MILLIGRAM(S): at 21:29

## 2024-09-28 RX ADMIN — Medication 2 TABLET(S): at 21:30

## 2024-09-28 RX ADMIN — CHLORHEXIDINE GLUCONATE ORAL RINSE 1 APPLICATION(S): 1.2 SOLUTION DENTAL at 05:25

## 2024-09-28 RX ADMIN — FOLIC ACID 1 MILLIGRAM(S): 1 TABLET ORAL at 12:48

## 2024-09-28 NOTE — PROGRESS NOTE ADULT - SUBJECTIVE AND OBJECTIVE BOX
HOSPITALIST PROGRESS NOTE    LAUREN COLLINS  [id number]  84yMale    Patient is a 84y old  Male who presents with a chief complaint of Right PCA infarct with hemorrhagic conversion (27 Sep 2024 15:46)      SUBJECTIVE:   Chart reviewed since last visit.   Patient seen and examined at bedside for CVA with hemorrhagic conversion, UTI, CRISTO  Thinks he's at home  Denies any headache, dizziness, paresthesia, paresis      OBJECTIVE:  Vital Signs Last 24 Hrs  T(C): 36.6 (28 Sep 2024 17:30), Max: 36.7 (28 Sep 2024 08:39)  T(F): 97.8 (28 Sep 2024 17:30), Max: 98 (28 Sep 2024 08:39)  HR: 51 (28 Sep 2024 17:30) (49 - 62)  BP: 119/67 (28 Sep 2024 17:30) (115/60 - 156/76)  BP(mean): --  RR: 18 (28 Sep 2024 17:30) (17 - 19)  SpO2: 99% (28 Sep 2024 17:30) (93% - 99%)    Parameters below as of 28 Sep 2024 12:25  Patient On (Oxygen Delivery Method): room air        PHYSICAL EXAMINATION  General: Elderly male lying in bed, comfortable  HEENT:  Left visual deficit  NECK:  Supple  CVS: regular rate and rhythm S1 S2. ILR dressing  RESP:  Fair air entry  GI:  Soft nondistended nontender BS+  : Condom catheter  MSK:  Raises all extremities off bed  CNS:  Left homonymous hemianopsia  INTEG:  warm dry skin  PSYCH:  Fair mood, confused    MONITOR:  CAPILLARY BLOOD GLUCOSE      POCT Blood Glucose.: 132 mg/dL (28 Sep 2024 17:51)  POCT Blood Glucose.: 194 mg/dL (28 Sep 2024 12:53)  POCT Blood Glucose.: 86 mg/dL (28 Sep 2024 08:24)  POCT Blood Glucose.: 196 mg/dL (27 Sep 2024 23:38)  POCT Blood Glucose.: 62 mg/dL (27 Sep 2024 22:24)  POCT Blood Glucose.: 64 mg/dL (27 Sep 2024 22:21)        I&O's Summary    27 Sep 2024 07:01  -  28 Sep 2024 07:00  --------------------------------------------------------  IN: 970 mL / OUT: 600 mL / NET: 370 mL                            10.6   1.89  )-----------( 160      ( 28 Sep 2024 06:36 )             31.9       09-28    140  |  108  |  28.3[H]  ----------------------------<  68[L]  3.7   |  19.0[L]  |  1.56[H]    Ca    8.9      28 Sep 2024 06:36  Phos  3.9     09-28  Mg     2.2     09-28    TPro  6.9  /  Alb  3.2[L]  /  TBili  0.5  /  DBili  x   /  AST  20  /  ALT  17  /  AlkPhos  69  09-28        Urinalysis Basic - ( 28 Sep 2024 06:36 )    Color: x / Appearance: x / SG: x / pH: x  Gluc: 68 mg/dL / Ketone: x  / Bili: x / Urobili: x   Blood: x / Protein: x / Nitrite: x   Leuk Esterase: x / RBC: x / WBC x   Sq Epi: x / Non Sq Epi: x / Bacteria: x            TTE:    RADIOLOGY    < from: MR Head No Cont (09.24.24 @ 20:06) >  IMPRESSION:  Subacute infarct in the right posterior cerebral artery vascular   territory with hemorrhagic conversion appears grossly unchanged from CT   of 09/24/2024.    < end of copied text >      MEDICATIONS  (STANDING):  atorvastatin 40 milliGRAM(s) Oral at bedtime  cefTRIAXone Injectable. 1000 milliGRAM(s) IV Push every 24 hours  chlorhexidine 2% Cloths 1 Application(s) Topical <User Schedule>  folic acid 1 milliGRAM(s) Oral daily  insulin lispro (ADMELOG) corrective regimen sliding scale   SubCutaneous Before meals and at bedtime  levothyroxine 75 MICROGram(s) Oral daily  metoprolol succinate ER 25 milliGRAM(s) Oral daily  pantoprazole    Tablet 40 milliGRAM(s) Oral before breakfast  QUEtiapine 25 milliGRAM(s) Oral at bedtime  senna 2 Tablet(s) Oral at bedtime  sodium chloride 0.9%. 1000 milliLiter(s) (100 mL/Hr) IV Continuous <Continuous>  tamsulosin 0.4 milliGRAM(s) Oral at bedtime      MEDICATIONS  (PRN):  acetaminophen     Tablet .. 650 milliGRAM(s) Oral every 6 hours PRN Mild Pain (1 - 3), Moderate Pain (4 - 6), Severe Pain (7 - 10)  melatonin 3 milliGRAM(s) Oral at bedtime PRN Insomnia  OLANZapine Injectable 2.5 milliGRAM(s) IntraMuscular every 8 hours PRN agitation

## 2024-09-28 NOTE — PROGRESS NOTE ADULT - ASSESSMENT
84 year old male with Hypertension, Dyslipidemia, Diabetes, Hypothyroidism, GERD, Smoldering Multiple Myeloma and recent occipital stroke presented after fall from Rehab and admitted with conversion of recent right PCA infarct. CRISTO and Bicytopenia present upon admission.    # Hemorrhagic conversion of recent Right occipital stroke  # Encephalopathy. Multifactorial CVA, CRISTO, Hospital Delirium  Residual Left homonymous hemianopsia. Imaging stable  ILR placed (9/27/24)  - Neurological monitoring  - BP control.  - DAPT resumption after 2 week; will need imaging prior to and after resumption. Neurosurgery follow up  - Statin reduced as LDL within target <70  - PT, OT; patient daughter wants home services  - Delirium precautions. Quetiapine added. PRN Olanzapine    # CRISTO. Continues to improve daily  - Hydration  - monitor renal function  - Avoid nephrotoxins    # UTI. Low WBC (Chronic), Afebrile without any examination findings currently  - Ceftriaxone  - Follow cultures    # Bicytopenia. Stable counts  # Smoldering multiple Myeloma  - Monitor blood counts  - Follow up with hematology    # Diabetes, type 2. A1c 6.2. Fair glycemic control.   - Blood glucose monitoring with sliding scale Lispro    # Hypothyroidism  - Levothyroxine    # GERD  - PPI    VTE prophylaxis - Intermittent pneumatic compression  Mobilize.    Disposition - pending cultures; likely home in next 24-48 hours    Discussed with patient daughter Dionne at bedside

## 2024-09-29 VITALS
TEMPERATURE: 98 F | OXYGEN SATURATION: 95 % | HEART RATE: 116 BPM | RESPIRATION RATE: 18 BRPM | DIASTOLIC BLOOD PRESSURE: 73 MMHG | SYSTOLIC BLOOD PRESSURE: 133 MMHG

## 2024-09-29 LAB
ALBUMIN SERPL ELPH-MCNC: 3.1 G/DL — LOW (ref 3.3–5.2)
ALP SERPL-CCNC: 67 U/L — SIGNIFICANT CHANGE UP (ref 40–120)
ALT FLD-CCNC: 17 U/L — SIGNIFICANT CHANGE UP
ANION GAP SERPL CALC-SCNC: 11 MMOL/L — SIGNIFICANT CHANGE UP (ref 5–17)
AST SERPL-CCNC: 20 U/L — SIGNIFICANT CHANGE UP
BILIRUB SERPL-MCNC: 0.4 MG/DL — SIGNIFICANT CHANGE UP (ref 0.4–2)
BUN SERPL-MCNC: 25.5 MG/DL — HIGH (ref 8–20)
CALCIUM SERPL-MCNC: 8.2 MG/DL — LOW (ref 8.4–10.5)
CHLORIDE SERPL-SCNC: 111 MMOL/L — HIGH (ref 96–108)
CO2 SERPL-SCNC: 19 MMOL/L — LOW (ref 22–29)
CREAT SERPL-MCNC: 1.48 MG/DL — HIGH (ref 0.5–1.3)
CULTURE RESULTS: SIGNIFICANT CHANGE UP
EGFR: 47 ML/MIN/1.73M2 — LOW
GLUCOSE BLDC GLUCOMTR-MCNC: 106 MG/DL — HIGH (ref 70–99)
GLUCOSE BLDC GLUCOMTR-MCNC: 109 MG/DL — HIGH (ref 70–99)
GLUCOSE BLDC GLUCOMTR-MCNC: 60 MG/DL — LOW (ref 70–99)
GLUCOSE BLDC GLUCOMTR-MCNC: 94 MG/DL — SIGNIFICANT CHANGE UP (ref 70–99)
GLUCOSE SERPL-MCNC: 76 MG/DL — SIGNIFICANT CHANGE UP (ref 70–99)
HCT VFR BLD CALC: 29.6 % — LOW (ref 39–50)
HGB BLD-MCNC: 9.7 G/DL — LOW (ref 13–17)
MCHC RBC-ENTMCNC: 29.8 PG — SIGNIFICANT CHANGE UP (ref 27–34)
MCHC RBC-ENTMCNC: 32.8 GM/DL — SIGNIFICANT CHANGE UP (ref 32–36)
MCV RBC AUTO: 90.8 FL — SIGNIFICANT CHANGE UP (ref 80–100)
PLATELET # BLD AUTO: 146 K/UL — LOW (ref 150–400)
POTASSIUM SERPL-MCNC: 3.8 MMOL/L — SIGNIFICANT CHANGE UP (ref 3.5–5.3)
POTASSIUM SERPL-SCNC: 3.8 MMOL/L — SIGNIFICANT CHANGE UP (ref 3.5–5.3)
PROT SERPL-MCNC: 6.3 G/DL — LOW (ref 6.6–8.7)
RBC # BLD: 3.26 M/UL — LOW (ref 4.2–5.8)
RBC # FLD: 12.7 % — SIGNIFICANT CHANGE UP (ref 10.3–14.5)
SODIUM SERPL-SCNC: 140 MMOL/L — SIGNIFICANT CHANGE UP (ref 135–145)
SPECIMEN SOURCE: SIGNIFICANT CHANGE UP
WBC # BLD: 2.32 K/UL — LOW (ref 3.8–10.5)
WBC # FLD AUTO: 2.32 K/UL — LOW (ref 3.8–10.5)

## 2024-09-29 PROCEDURE — C1764: CPT

## 2024-09-29 PROCEDURE — 85730 THROMBOPLASTIN TIME PARTIAL: CPT

## 2024-09-29 PROCEDURE — 87640 STAPH A DNA AMP PROBE: CPT

## 2024-09-29 PROCEDURE — 84100 ASSAY OF PHOSPHORUS: CPT

## 2024-09-29 PROCEDURE — 83036 HEMOGLOBIN GLYCOSYLATED A1C: CPT

## 2024-09-29 PROCEDURE — 70450 CT HEAD/BRAIN W/O DYE: CPT | Mod: MC

## 2024-09-29 PROCEDURE — 72125 CT NECK SPINE W/O DYE: CPT | Mod: MC

## 2024-09-29 PROCEDURE — 80061 LIPID PANEL: CPT

## 2024-09-29 PROCEDURE — 33285 INSJ SUBQ CAR RHYTHM MNTR: CPT

## 2024-09-29 PROCEDURE — 80053 COMPREHEN METABOLIC PANEL: CPT

## 2024-09-29 PROCEDURE — 82962 GLUCOSE BLOOD TEST: CPT

## 2024-09-29 PROCEDURE — C8929: CPT

## 2024-09-29 PROCEDURE — 85025 COMPLETE CBC W/AUTO DIFF WBC: CPT

## 2024-09-29 PROCEDURE — 84484 ASSAY OF TROPONIN QUANT: CPT

## 2024-09-29 PROCEDURE — 83735 ASSAY OF MAGNESIUM: CPT

## 2024-09-29 PROCEDURE — 36415 COLL VENOUS BLD VENIPUNCTURE: CPT

## 2024-09-29 PROCEDURE — 83605 ASSAY OF LACTIC ACID: CPT

## 2024-09-29 PROCEDURE — 93320 DOPPLER ECHO COMPLETE: CPT

## 2024-09-29 PROCEDURE — 80048 BASIC METABOLIC PNL TOTAL CA: CPT

## 2024-09-29 PROCEDURE — 80076 HEPATIC FUNCTION PANEL: CPT

## 2024-09-29 PROCEDURE — 85610 PROTHROMBIN TIME: CPT

## 2024-09-29 PROCEDURE — 87086 URINE CULTURE/COLONY COUNT: CPT

## 2024-09-29 PROCEDURE — 70551 MRI BRAIN STEM W/O DYE: CPT | Mod: MC

## 2024-09-29 PROCEDURE — 93312 ECHO TRANSESOPHAGEAL: CPT

## 2024-09-29 PROCEDURE — 85576 BLOOD PLATELET AGGREGATION: CPT

## 2024-09-29 PROCEDURE — 87641 MR-STAPH DNA AMP PROBE: CPT

## 2024-09-29 PROCEDURE — 83880 ASSAY OF NATRIURETIC PEPTIDE: CPT

## 2024-09-29 PROCEDURE — 93325 DOPPLER ECHO COLOR FLOW MAPG: CPT

## 2024-09-29 PROCEDURE — 99239 HOSP IP/OBS DSCHRG MGMT >30: CPT

## 2024-09-29 PROCEDURE — 97163 PT EVAL HIGH COMPLEX 45 MIN: CPT

## 2024-09-29 PROCEDURE — 93010 ELECTROCARDIOGRAM REPORT: CPT

## 2024-09-29 PROCEDURE — 76376 3D RENDER W/INTRP POSTPROCES: CPT

## 2024-09-29 PROCEDURE — 85027 COMPLETE CBC AUTOMATED: CPT

## 2024-09-29 PROCEDURE — 81001 URINALYSIS AUTO W/SCOPE: CPT

## 2024-09-29 PROCEDURE — 93005 ELECTROCARDIOGRAM TRACING: CPT

## 2024-09-29 RX ORDER — CEFPODOXIME PROXETIL 50 MG/5 ML
1 SUSPENSION, RECONSTITUTED, ORAL (ML) ORAL
Qty: 8 | Refills: 0
Start: 2024-09-29 | End: 2024-10-02

## 2024-09-29 RX ORDER — CEFTRIAXONE SODIUM 1 G
1000 VIAL (EA) INJECTION EVERY 24 HOURS
Refills: 0 | Status: DISCONTINUED | OUTPATIENT
Start: 2024-09-29 | End: 2024-09-29

## 2024-09-29 RX ADMIN — FOLIC ACID 1 MILLIGRAM(S): 1 TABLET ORAL at 12:15

## 2024-09-29 RX ADMIN — PANTOPRAZOLE SODIUM 40 MILLIGRAM(S): 40 TABLET, DELAYED RELEASE ORAL at 06:09

## 2024-09-29 RX ADMIN — Medication 100 MILLILITER(S): at 00:53

## 2024-09-29 RX ADMIN — Medication 75 MICROGRAM(S): at 05:19

## 2024-09-29 RX ADMIN — CHLORHEXIDINE GLUCONATE ORAL RINSE 1 APPLICATION(S): 1.2 SOLUTION DENTAL at 05:19

## 2024-09-29 NOTE — PROGRESS NOTE ADULT - SUBJECTIVE AND OBJECTIVE BOX
SUBJECTIVE:    Chief Complaint: Patient is a 84y old  Male who presents with a chief complaint of Right PCA infarct with hemorrhagic conversion (29 Sep 2024 07:07)    84M w PMHx of multiple myeloma, DM, htn, hld, GERD, 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 Imaging found hemorrhagic conversion of R PCA.    INTERVAL HPI/LAST 24HRS EVENTS: Patient seen and examined at bedside at AM. No clinically acute event overnight.   Denies any chest pain, palpitations, SOB, PND, orthopnea, leg edema, N/V/D, or any other complaints.     MEDICATIONS  (STANDING):  atorvastatin 40 milliGRAM(s) Oral at bedtime  cefTRIAXone Injectable. 1000 milliGRAM(s) IV Push every 24 hours  chlorhexidine 2% Cloths 1 Application(s) Topical <User Schedule>  folic acid 1 milliGRAM(s) Oral daily  insulin lispro (ADMELOG) corrective regimen sliding scale   SubCutaneous Before meals and at bedtime  levothyroxine 75 MICROGram(s) Oral daily  metoprolol succinate ER 25 milliGRAM(s) Oral daily  pantoprazole    Tablet 40 milliGRAM(s) Oral before breakfast  QUEtiapine 25 milliGRAM(s) Oral at bedtime  senna 2 Tablet(s) Oral at bedtime  sodium chloride 0.9%. 1000 milliLiter(s) (100 mL/Hr) IV Continuous <Continuous>  tamsulosin 0.4 milliGRAM(s) Oral at bedtime    MEDICATIONS  (PRN):  acetaminophen     Tablet .. 650 milliGRAM(s) Oral every 6 hours PRN Mild Pain (1 - 3), Moderate Pain (4 - 6), Severe Pain (7 - 10)  melatonin 3 milliGRAM(s) Oral at bedtime PRN Insomnia  OLANZapine Injectable 2.5 milliGRAM(s) IntraMuscular every 8 hours PRN agitation      Allergies    No Known Allergies    Intolerances        REVIEW OF SYSTEMS:  CONSTITUTIONAL: No fever, weight loss, or fatigue  RESPIRATORY: No cough, wheezing, chills or hemoptysis; No shortness of breath  CARDIOVASCULAR: No chest pain, palpitations, dizziness, or leg swelling  GASTROINTESTINAL: No abdominal or epigastric pain. No nausea, vomiting, or hematemesis; No diarrhea or constipation. No melena or hematochezia.  NEUROLOGICAL: No headaches, loss of strength, numbness, or tremors  MUSCULOSKELETAL: No joint pain or swelling; No muscle, back, or extremity pain    OBJECTIVE:    Vital Signs Last 24 Hrs  T(C): 36.6 (29 Sep 2024 04:15), Max: 36.9 (29 Sep 2024 00:05)  T(F): 97.8 (29 Sep 2024 04:15), Max: 98.5 (29 Sep 2024 00:05)  HR: 51 (29 Sep 2024 04:15) (51 - 58)  BP: 122/79 (29 Sep 2024 04:15) (119/67 - 129/67)  BP(mean): --  RR: 18 (29 Sep 2024 04:15) (18 - 19)  SpO2: 99% (29 Sep 2024 04:15) (98% - 99%)    Parameters below as of 29 Sep 2024 04:15  Patient On (Oxygen Delivery Method): room air        PHYSICAL EXAM:  Constitutional: Alert, interactive, comfortable, NAD  Head and Face: Atraumatic, head and face were normal in appearance  Eyes: Sclera and conjunctiva were normal, pupils were equal in size, round, eyelids normal  ENT: Ears and nose were normal in appearance  Neck: Appearance was normal, neck was supple, No JVD  Pulmonary: Clear to auscultation bilaterally, no rales/crackles, or wheezing  Heart: Regular rate and rhythm, no murmurs, gallops, or pericardial rubs  Abdominal: Soft, nontender, nondistended. No appreciable hepatosplenomegaly. Bowel sounds normal  Skin: Normal color and intact without appreciable rash or abnormal skin lesion  Extremities: Warm without edema. No clubbing.  Pulse: 2+ radial pulse  Neuro: Oriented to person, place, and time    Lab/ Imaging:    LABS:                        9.7    2.32  )-----------( 146      ( 29 Sep 2024 05:50 )             29.6     09-29    140  |  111[H]  |  25.5[H]  ----------------------------<  76  3.8   |  19.0[L]  |  1.48[H]    Ca    8.2[L]      29 Sep 2024 05:50  Phos  3.9     09-28  Mg     2.2     09-28    TPro  6.3[L]  /  Alb  3.1[L]  /  TBili  0.4  /  DBili  x   /  AST  20  /  ALT  17  /  AlkPhos  67  09-29      Urinalysis Basic - ( 29 Sep 2024 05:50 )    Color: x / Appearance: x / SG: x / pH: x  Gluc: 76 mg/dL / Ketone: x  / Bili: x / Urobili: x   Blood: x / Protein: x / Nitrite: x   Leuk Esterase: x / RBC: x / WBC x   Sq Epi: x / Non Sq Epi: x / Bacteria: x      CAPILLARY BLOOD GLUCOSE      POCT Blood Glucose.: 94 mg/dL (29 Sep 2024 06:29)  POCT Blood Glucose.: 60 mg/dL (29 Sep 2024 06:04)  POCT Blood Glucose.: 130 mg/dL (28 Sep 2024 21:28)  POCT Blood Glucose.: 132 mg/dL (28 Sep 2024 17:51)  POCT Blood Glucose.: 194 mg/dL (28 Sep 2024 12:53)          Imaging Personally Reviewed:  [ ] YES  [ ] NO    Consultant(s) Notes Reviewed:  [ ] YES  [ ] NO    Care Discussed with Consultants/Other Providers [ ] YES  [ ] NO    Plan of Care discussed with Housestaff [ ]YES [ ] NO SUBJECTIVE:    Chief Complaint: Patient is a 84y old  Male who presents with a chief complaint of Right PCA infarct with hemorrhagic conversion (29 Sep 2024 07:07)    84M w PMHx of multiple myeloma, DM, htn, hld, GERD, 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 Imaging found hemorrhagic conversion of R PCA.    INTERVAL HPI/LAST 24HRS EVENTS: Patient seen and examined at bedside at AM. No clinically acute event overnight.   Denies any chest pain, palpitations, SOB, PND, orthopnea, leg edema, N/V/D, or any other complaints.     MEDICATIONS  (STANDING):  atorvastatin 40 milliGRAM(s) Oral at bedtime  cefTRIAXone Injectable. 1000 milliGRAM(s) IV Push every 24 hours  chlorhexidine 2% Cloths 1 Application(s) Topical <User Schedule>  folic acid 1 milliGRAM(s) Oral daily  insulin lispro (ADMELOG) corrective regimen sliding scale   SubCutaneous Before meals and at bedtime  levothyroxine 75 MICROGram(s) Oral daily  metoprolol succinate ER 25 milliGRAM(s) Oral daily  pantoprazole    Tablet 40 milliGRAM(s) Oral before breakfast  QUEtiapine 25 milliGRAM(s) Oral at bedtime  senna 2 Tablet(s) Oral at bedtime  sodium chloride 0.9%. 1000 milliLiter(s) (100 mL/Hr) IV Continuous <Continuous>  tamsulosin 0.4 milliGRAM(s) Oral at bedtime    MEDICATIONS  (PRN):  acetaminophen     Tablet .. 650 milliGRAM(s) Oral every 6 hours PRN Mild Pain (1 - 3), Moderate Pain (4 - 6), Severe Pain (7 - 10)  melatonin 3 milliGRAM(s) Oral at bedtime PRN Insomnia  OLANZapine Injectable 2.5 milliGRAM(s) IntraMuscular every 8 hours PRN agitation      Allergies    No Known Allergies    Intolerances        REVIEW OF SYSTEMS:  CONSTITUTIONAL: No fever, weight loss, or fatigue  RESPIRATORY: No cough, wheezing, chills or hemoptysis; No shortness of breath  CARDIOVASCULAR: No chest pain, palpitations, dizziness, or leg swelling  GASTROINTESTINAL: No abdominal or epigastric pain. No nausea, vomiting, or hematemesis; No diarrhea or constipation. No melena or hematochezia.  NEUROLOGICAL: No headaches, loss of strength, numbness, or tremors  MUSCULOSKELETAL: No joint pain or swelling; No muscle, back, or extremity pain    OBJECTIVE:    Vital Signs Last 24 Hrs  T(C): 36.6 (29 Sep 2024 04:15), Max: 36.9 (29 Sep 2024 00:05)  T(F): 97.8 (29 Sep 2024 04:15), Max: 98.5 (29 Sep 2024 00:05)  HR: 51 (29 Sep 2024 04:15) (51 - 58)  BP: 122/79 (29 Sep 2024 04:15) (119/67 - 129/67)  BP(mean): --  RR: 18 (29 Sep 2024 04:15) (18 - 19)  SpO2: 99% (29 Sep 2024 04:15) (98% - 99%)    Parameters below as of 29 Sep 2024 04:15  Patient On (Oxygen Delivery Method): room air    PHYSICAL EXAM:  Constitutional: Alert, interactive, comfortable, NAD  Head and Face: Atraumatic, head and face were normal in appearance  Eyes: Sclera and conjunctiva were normal, pupils were equal in size, round, eyelids normal  ENT: Ears and nose were normal in appearance  Neck: Appearance was normal, neck was supple, No JVD  Pulmonary: Clear to auscultation bilaterally, no rales/crackles, or wheezing  Heart: Regular rate and rhythm, no murmurs, gallops, or pericardial rubs  Abdominal: Soft, nontender, nondistended. No appreciable hepatosplenomegaly. Bowel sounds normal  Skin: Normal color and intact without appreciable rash or abnormal skin lesion  Extremities: Warm without edema. No clubbing.  Pulse: 2+ radial pulse  Neuro: Oriented to person, place, and time    Lab/ Imaging:    LABS:                        9.7    2.32  )-----------( 146      ( 29 Sep 2024 05:50 )             29.6     09-29    140  |  111[H]  |  25.5[H]  ----------------------------<  76  3.8   |  19.0[L]  |  1.48[H]    Ca    8.2[L]      29 Sep 2024 05:50  Phos  3.9     09-28  Mg     2.2     09-28    TPro  6.3[L]  /  Alb  3.1[L]  /  TBili  0.4  /  DBili  x   /  AST  20  /  ALT  17  /  AlkPhos  67  09-29      Urinalysis Basic - ( 29 Sep 2024 05:50 )    Color: x / Appearance: x / SG: x / pH: x  Gluc: 76 mg/dL / Ketone: x  / Bili: x / Urobili: x   Blood: x / Protein: x / Nitrite: x   Leuk Esterase: x / RBC: x / WBC x   Sq Epi: x / Non Sq Epi: x / Bacteria: x      CAPILLARY BLOOD GLUCOSE      POCT Blood Glucose.: 94 mg/dL (29 Sep 2024 06:29)  POCT Blood Glucose.: 60 mg/dL (29 Sep 2024 06:04)  POCT Blood Glucose.: 130 mg/dL (28 Sep 2024 21:28)  POCT Blood Glucose.: 132 mg/dL (28 Sep 2024 17:51)  POCT Blood Glucose.: 194 mg/dL (28 Sep 2024 12:53)          Imaging Personally Reviewed:  [ ] YES  [ ] NO    Consultant(s) Notes Reviewed:  [ ] YES  [ ] NO    Care Discussed with Consultants/Other Providers [ ] YES  [ ] NO    Plan of Care discussed with Housestaff [ ]YES [ ] NO SUBJECTIVE:    Chief Complaint: Patient is a 84y old  Male who presents with a chief complaint of Right PCA infarct with hemorrhagic conversion (29 Sep 2024 07:07)    84M w PMHx of multiple myeloma, DM, htn, hld, GERD, 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 Imaging found hemorrhagic conversion of R PCA.    INTERVAL HPI/LAST 24HRS EVENTS: Patient seen and examined at bedside at AM. No clinically acute event overnight.   Denies any chest pain, palpitations, SOB, PND, orthopnea, leg edema, N/V/D, or any other complaints.     MEDICATIONS  (STANDING):  atorvastatin 40 milliGRAM(s) Oral at bedtime  cefTRIAXone Injectable. 1000 milliGRAM(s) IV Push every 24 hours  chlorhexidine 2% Cloths 1 Application(s) Topical <User Schedule>  folic acid 1 milliGRAM(s) Oral daily  insulin lispro (ADMELOG) corrective regimen sliding scale   SubCutaneous Before meals and at bedtime  levothyroxine 75 MICROGram(s) Oral daily  metoprolol succinate ER 25 milliGRAM(s) Oral daily  pantoprazole    Tablet 40 milliGRAM(s) Oral before breakfast  QUEtiapine 25 milliGRAM(s) Oral at bedtime  senna 2 Tablet(s) Oral at bedtime  sodium chloride 0.9%. 1000 milliLiter(s) (100 mL/Hr) IV Continuous <Continuous>  tamsulosin 0.4 milliGRAM(s) Oral at bedtime    MEDICATIONS  (PRN):  acetaminophen     Tablet .. 650 milliGRAM(s) Oral every 6 hours PRN Mild Pain (1 - 3), Moderate Pain (4 - 6), Severe Pain (7 - 10)  melatonin 3 milliGRAM(s) Oral at bedtime PRN Insomnia  OLANZapine Injectable 2.5 milliGRAM(s) IntraMuscular every 8 hours PRN agitation      Allergies    No Known Allergies    Intolerances        REVIEW OF SYSTEMS:  CONSTITUTIONAL: No fever, weight loss, or fatigue  RESPIRATORY: No cough, wheezing, chills or hemoptysis; No shortness of breath  CARDIOVASCULAR: No chest pain, palpitations, dizziness, or leg swelling  GASTROINTESTINAL: No abdominal or epigastric pain. No nausea, vomiting, or hematemesis; No diarrhea or constipation. No melena or hematochezia.  NEUROLOGICAL: No headaches, loss of strength, numbness, or tremors  MUSCULOSKELETAL: No joint pain or swelling; No muscle, back, or extremity pain    OBJECTIVE:    Vital Signs Last 24 Hrs  T(C): 36.6 (29 Sep 2024 04:15), Max: 36.9 (29 Sep 2024 00:05)  T(F): 97.8 (29 Sep 2024 04:15), Max: 98.5 (29 Sep 2024 00:05)  HR: 51 (29 Sep 2024 04:15) (51 - 58)  BP: 122/79 (29 Sep 2024 04:15) (119/67 - 129/67)  BP(mean): --  RR: 18 (29 Sep 2024 04:15) (18 - 19)  SpO2: 99% (29 Sep 2024 04:15) (98% - 99%)    Parameters below as of 29 Sep 2024 04:15  Patient On (Oxygen Delivery Method): room air    PHYSICAL EXAM:  Constitutional: Interactive, comfortable, seated in chair.  Head and Face: Atraumatic, head and face were normal in appearance  Eyes: Sclera and conjunctiva were normal / sclera slightly yellow, pupils were equal in size, round, eyelids normal  ENT: Ears and nose were normal in appearance  Neck: Appearance was normal, neck was supple, No JVD  Pulmonary: Clear to auscultation bilaterally, no rales/crackles, or wheezing  Heart: Regular rate and rhythm, no murmurs, gallops, or pericardial rubs  Abdominal: Soft, nontender, nondistended. No appreciable hepatosplenomegaly. Bowel sounds normal  Skin: Normal color and intact without appreciable rash or abnormal skin lesion  Extremities: Warm without edema. No clubbing.  Pulse: 2+ radial pulse  Neuro: Oriented to person. Did not know his birthday and thought we were in a rehab center. Did not know we were in a hospital or that he had a UTI.    Lab/ Imaging:    LABS:                        9.7    2.32  )-----------( 146      ( 29 Sep 2024 05:50 )             29.6     09-29    140  |  111[H]  |  25.5[H]  ----------------------------<  76  3.8   |  19.0[L]  |  1.48[H]    Ca    8.2[L]      29 Sep 2024 05:50  Phos  3.9     09-28  Mg     2.2     09-28    TPro  6.3[L]  /  Alb  3.1[L]  /  TBili  0.4  /  DBili  x   /  AST  20  /  ALT  17  /  AlkPhos  67  09-29      Urinalysis Basic - ( 29 Sep 2024 05:50 )    Color: x / Appearance: x / SG: x / pH: x  Gluc: 76 mg/dL / Ketone: x  / Bili: x / Urobili: x   Blood: x / Protein: x / Nitrite: x   Leuk Esterase: x / RBC: x / WBC x   Sq Epi: x / Non Sq Epi: x / Bacteria: x      CAPILLARY BLOOD GLUCOSE      POCT Blood Glucose.: 94 mg/dL (29 Sep 2024 06:29)  POCT Blood Glucose.: 60 mg/dL (29 Sep 2024 06:04)  POCT Blood Glucose.: 130 mg/dL (28 Sep 2024 21:28)  POCT Blood Glucose.: 132 mg/dL (28 Sep 2024 17:51)  POCT Blood Glucose.: 194 mg/dL (28 Sep 2024 12:53)          Imaging Personally Reviewed:  [ ] YES  [ ] NO    Consultant(s) Notes Reviewed:  [ ] YES  [ ] NO    Care Discussed with Consultants/Other Providers [ ] YES  [ ] NO    Plan of Care discussed with Housestaff [ ]YES [ ] NO SUBJECTIVE:    Chief Complaint: Patient is a 84y old  Male who presents with a chief complaint of Right PCA infarct with hemorrhagic conversion (29 Sep 2024 07:07)    84M w PMHx of multiple myeloma, DM, htn, hld, GERD, 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 Imaging found hemorrhagic conversion of R PCA.    INTERVAL HPI/LAST 24HRS EVENTS: Patient seen and examined at bedside at AM. No clinically acute event overnight.   Denies any chest pain, palpitations, SOB, PND, orthopnea, leg edema, N/V/D, or any other complaints.     MEDICATIONS  (STANDING):  atorvastatin 40 milliGRAM(s) Oral at bedtime  cefTRIAXone Injectable. 1000 milliGRAM(s) IV Push every 24 hours  chlorhexidine 2% Cloths 1 Application(s) Topical <User Schedule>  folic acid 1 milliGRAM(s) Oral daily  insulin lispro (ADMELOG) corrective regimen sliding scale   SubCutaneous Before meals and at bedtime  levothyroxine 75 MICROGram(s) Oral daily  metoprolol succinate ER 25 milliGRAM(s) Oral daily  pantoprazole    Tablet 40 milliGRAM(s) Oral before breakfast  QUEtiapine 25 milliGRAM(s) Oral at bedtime  senna 2 Tablet(s) Oral at bedtime  sodium chloride 0.9%. 1000 milliLiter(s) (100 mL/Hr) IV Continuous <Continuous>  tamsulosin 0.4 milliGRAM(s) Oral at bedtime    MEDICATIONS  (PRN):  acetaminophen     Tablet .. 650 milliGRAM(s) Oral every 6 hours PRN Mild Pain (1 - 3), Moderate Pain (4 - 6), Severe Pain (7 - 10)  melatonin 3 milliGRAM(s) Oral at bedtime PRN Insomnia  OLANZapine Injectable 2.5 milliGRAM(s) IntraMuscular every 8 hours PRN agitation      Allergies    No Known Allergies    Intolerances        REVIEW OF SYSTEMS:  CONSTITUTIONAL: No fever, weight loss, or fatigue  RESPIRATORY: No cough, wheezing, chills or hemoptysis; No shortness of breath  CARDIOVASCULAR: No chest pain, palpitations, dizziness, or leg swelling  GASTROINTESTINAL: No abdominal or epigastric pain. No nausea, vomiting, or hematemesis; No diarrhea or constipation. No melena or hematochezia.  NEUROLOGICAL: No headaches, loss of strength, numbness, or tremors  MUSCULOSKELETAL: No joint pain or swelling; No muscle, back, or extremity pain    OBJECTIVE:    Vital Signs Last 24 Hrs  T(C): 36.6 (29 Sep 2024 04:15), Max: 36.9 (29 Sep 2024 00:05)  T(F): 97.8 (29 Sep 2024 04:15), Max: 98.5 (29 Sep 2024 00:05)  HR: 51 (29 Sep 2024 04:15) (51 - 58)  BP: 122/79 (29 Sep 2024 04:15) (119/67 - 129/67)  BP(mean): --  RR: 18 (29 Sep 2024 04:15) (18 - 19)  SpO2: 99% (29 Sep 2024 04:15) (98% - 99%)    Parameters below as of 29 Sep 2024 04:15  Patient On (Oxygen Delivery Method): room air    PHYSICAL EXAM:  Constitutional: Interactive, comfortable, seated in chair.  Head and Face: Atraumatic, head and face were normal in appearance  Eyes: Sclera and conjunctiva were normal, pupils were equal in size, round, eyelids normal. Left complete hemianopsia.  ENT: Ears and nose were normal in appearance  Neck: Appearance was normal, neck was supple, No JVD  Pulmonary: Clear to auscultation bilaterally, no rales/crackles, or wheezing  Heart: Regular rate and rhythm, no murmurs, gallops, or pericardial rubs  Abdominal: Soft, nontender, nondistended. No appreciable hepatosplenomegaly. Bowel sounds normal  Skin: Normal color and intact without appreciable rash or abnormal skin lesion  Extremities: Warm without edema. No clubbing.  Pulse: 2+ radial pulse  Neuro: Oriented to person. Did not know his birthday and thought we were in a rehab center. Did not know we were in a hospital or that he had a UTI.    Lab/ Imaging:    LABS:                        9.7    2.32  )-----------( 146      ( 29 Sep 2024 05:50 )             29.6     09-29    140  |  111[H]  |  25.5[H]  ----------------------------<  76  3.8   |  19.0[L]  |  1.48[H]    Ca    8.2[L]      29 Sep 2024 05:50  Phos  3.9     09-28  Mg     2.2     09-28    TPro  6.3[L]  /  Alb  3.1[L]  /  TBili  0.4  /  DBili  x   /  AST  20  /  ALT  17  /  AlkPhos  67  09-29      Urinalysis Basic - ( 29 Sep 2024 05:50 )    Color: x / Appearance: x / SG: x / pH: x  Gluc: 76 mg/dL / Ketone: x  / Bili: x / Urobili: x   Blood: x / Protein: x / Nitrite: x   Leuk Esterase: x / RBC: x / WBC x   Sq Epi: x / Non Sq Epi: x / Bacteria: x      CAPILLARY BLOOD GLUCOSE      POCT Blood Glucose.: 94 mg/dL (29 Sep 2024 06:29)  POCT Blood Glucose.: 60 mg/dL (29 Sep 2024 06:04)  POCT Blood Glucose.: 130 mg/dL (28 Sep 2024 21:28)  POCT Blood Glucose.: 132 mg/dL (28 Sep 2024 17:51)  POCT Blood Glucose.: 194 mg/dL (28 Sep 2024 12:53)          Imaging Personally Reviewed:  [ ] YES  [ ] NO    Consultant(s) Notes Reviewed:  [ ] YES  [ ] NO    Care Discussed with Consultants/Other Providers [ ] YES  [ ] NO    Plan of Care discussed with Housestaff [ ]YES [ ] NO

## 2024-09-29 NOTE — PROGRESS NOTE ADULT - REASON FOR ADMISSION
Right PCA infarct with hemorrhagic conversion

## 2024-09-29 NOTE — PROGRESS NOTE ADULT - PROVIDER SPECIALTY LIST ADULT
Internal Medicine
Intervent Cardiology
Electrophysiology
Hospitalist
Internal Medicine
Neurology
Hospitalist
Intervent Cardiology
Neurosurgery
SICU

## 2024-09-29 NOTE — PROGRESS NOTE ADULT - NUTRITIONAL ASSESSMENT
This patient has been assessed with a concern for Malnutrition and has been determined to have a diagnosis/diagnoses of Severe protein-calorie malnutrition.    This patient is being managed with:   Diet Minced and Moist-  Mildly Thick Liquids ([email or web address])  Entered: Sep 26 2024  1:21PM  
This patient has been assessed with a concern for Malnutrition and has been determined to have a diagnosis/diagnoses of Severe protein-calorie malnutrition.    This patient is being managed with:   Diet NPO after Midnight-     NPO Start Date: 26-Sep-2024   NPO Start Time: 23:59  Entered: Sep 26 2024  3:06PM    Diet Minced and Moist-  Mildly Thick Liquids ([email or web address])  Entered: Sep 26 2024  1:21PM  
This patient has been assessed with a concern for Malnutrition and has been determined to have a diagnosis/diagnoses of Severe protein-calorie malnutrition.    This patient is being managed with:   Diet Minced and Moist-  Mildly Thick Liquids (MILDTHICKLIQS)  Entered: Sep 26 2024  1:21PM  
This patient has been assessed with a concern for Malnutrition and has been determined to have a diagnosis/diagnoses of Severe protein-calorie malnutrition.    This patient is being managed with:   Diet NPO after Midnight-     NPO Start Date: 26-Sep-2024   NPO Start Time: 23:59  Entered: Sep 26 2024  3:06PM    Diet Minced and Moist-  Mildly Thick Liquids ([email or web address])  Entered: Sep 26 2024  1:21PM

## 2024-09-29 NOTE — PROGRESS NOTE ADULT - ASSESSMENT
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 Admitted for hemorrhagic conversion of R PCA with developing encephalopathy.    # Acute metabolic encephalopathy / delirium 2/2 UTI   - olanzapine prn, seraquil standing  - pending UCx with positive UA, on ceftriaxone since 9/26  - q6h bladder scans with straight cath, possible vasquez needed     # R PCA with Hemorrhagic conversion  - imaging found hemorrhagic conversion in R posterior cerebral artery  - neurosurgery recs no AC/AP until cleared with neurosurgery & f/u CT Head  - after clearance, restart asa 81mg PO qd + clopidogrel 75mg PO qd  - s/p ILR: monitor / f/u outpt with cardiologist & neurologist     # multiple myeloma  # bicytopenia  - since admission, WBC ~2, plt 140-150  - heme recommends Dr. 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 Admitted for hemorrhagic conversion of R PCA with developing encephalopathy.    # Acute metabolic encephalopathy / delirium 2/2 UTI   - olanzapine prn, seraquil standing  - pending UCx with positive UA, on ceftriaxone since 9/26  - q6h bladder scans with straight cath, possible vasquez needed  - UCx shows likely contamination with 3+ organisms     # R PCA with Hemorrhagic conversion  - imaging found hemorrhagic conversion in R posterior cerebral artery  - neurosurgery recs no AC/AP until cleared with neurosurgery & f/u CT Head  - after clearance, restart asa 81mg PO qd + clopidogrel 75mg PO qd  - s/p ILR: monitor / f/u outpt with cardiologist & neurologist     # multiple myeloma  # bicytopenia  - since admission, WBC ~2, plt 140-150  - heme recommends Dr. 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 Admitted for hemorrhagic conversion of R PCA with developing encephalopathy.    # Acute metabolic encephalopathy / delirium 2/2 UTI   - followed commands for NIHSS 4  -- 2 for orientation and 2 for visual field  -- olanzapine prn, seraquil standing  - positive UA, on ceftriaxone since 9/26  - UCx shows likely contamination with 3+ organisms     # R PCA with Hemorrhagic conversion  - imaging found hemorrhagic conversion in R posterior cerebral artery  - neurosurgery recs no AC/AP until cleared with neurosurgery & f/u CT Head  - after clearance, restart asa 81mg PO qd + clopidogrel 75mg PO qd  - s/p ILR: monitor / f/u outpt with cardiologist & neurologist     # multiple myeloma  # bicytopenia  - since admission, WBC ~2, plt 140-150  - heme recommends Dr. 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

## 2024-09-29 NOTE — PROGRESS NOTE ADULT - ATTENDING COMMENTS
84 year old male with Hypertension, Dyslipidemia, Diabetes, Hypothyroidism, GERD, Smoldering Multiple Myeloma and recent occipital stroke presented after fall from Rehab and admitted with conversion of recent right PCA infarct. CRISTO and Bicytopenia present upon admission.    Patient interviewed and examined at bedside earlier today with resident FE Patel.  Patient denies any symptoms such as headahche, dizziness    Vital stable  Ambulated with patient  Examination significant for NIHSS4  Left homonymous hemianopsia which is unchanged    # Hemorrhagic conversion of recent Right occipital stroke. Residual Left homonymous hemianopsia. Imaging stable. ILR placed (9/27/24)  # Encephalopathy. Multifactorial CVA, CRISTO, Hospital Delirium  # CRISTO. Continues to improve daily  # UTI. Low WBC (Chronic), Afebrile without any examination findings currently. Urine culture polymicrobial  # Bicytopenia. Stable counts  # Smoldering multiple Myeloma  # Diabetes, type 2. A1c 6.2. Fair glycemic control.   # Hypothyroidism  # GERD    No APT x 2 weeks, follow up with Neurology  Hydration, follow up with PMD  Change Antibiotic to PO cefpodoxime to complete 7 days total  Follow up with Hematology  Resume home medications for chronic medical problems    Discharge home with home services.    Discussed with patient daughter Dionne over phone, all questions answered

## 2024-09-29 NOTE — DISCHARGE NOTE NURSING/CASE MANAGEMENT/SOCIAL WORK - PATIENT PORTAL LINK FT
You can access the FollowMyHealth Patient Portal offered by [facility] by registering at the following website: [email or web address]. By joining [employer]’s FollowMyHealth portal, you will also be able to view your health information using other applications (apps) compatible with our system.

## 2024-10-01 DIAGNOSIS — E11.9 TYPE 2 DIABETES MELLITUS W/OUT COMPLICATIONS: ICD-10-CM

## 2024-10-01 DIAGNOSIS — I61.9 NONTRAUMATIC INTRACEREBRAL HEMORRHAGE, UNSPECIFIED: ICD-10-CM

## 2024-10-01 DIAGNOSIS — Z95.818 PRESENCE OF OTHER CARDIAC IMPLANTS AND GRAFTS: ICD-10-CM

## 2024-10-01 DIAGNOSIS — Z87.440 PERSONAL HISTORY OF URINARY (TRACT) INFECTIONS: ICD-10-CM

## 2024-10-01 DIAGNOSIS — I63.9 CEREBRAL INFARCTION, UNSPECIFIED: ICD-10-CM

## 2024-10-01 DIAGNOSIS — C90.00 MULTIPLE MYELOMA NOT HAVING ACHIEVED REMISSION: ICD-10-CM

## 2024-10-01 DIAGNOSIS — E78.5 HYPERLIPIDEMIA, UNSPECIFIED: ICD-10-CM

## 2024-10-01 DIAGNOSIS — N17.9 ACUTE KIDNEY FAILURE, UNSPECIFIED: ICD-10-CM

## 2024-10-01 DIAGNOSIS — Z86.03 PERSONAL HISTORY OF NEOPLASM OF UNCERTAIN BEHAVIOR: ICD-10-CM

## 2024-10-01 DIAGNOSIS — N40.0 BENIGN PROSTATIC HYPERPLASIA WITHOUT LOWER URINARY TRACT SYMPMS: ICD-10-CM

## 2024-10-01 DIAGNOSIS — I10 ESSENTIAL (PRIMARY) HYPERTENSION: ICD-10-CM

## 2024-10-01 DIAGNOSIS — K21.9 GASTRO-ESOPHAGEAL REFLUX DISEASE W/OUT ESOPHAGITIS: ICD-10-CM

## 2024-10-01 DIAGNOSIS — E03.9 HYPOTHYROIDISM, UNSPECIFIED: ICD-10-CM

## 2024-10-01 RX ORDER — ATORVASTATIN CALCIUM 80 MG/1
80 TABLET, FILM COATED ORAL
Refills: 0 | Status: ACTIVE | COMMUNITY
Start: 2024-10-01

## 2024-10-01 RX ORDER — METOPROLOL SUCCINATE 25 MG/1
25 TABLET, EXTENDED RELEASE ORAL
Qty: 90 | Refills: 2 | Status: ACTIVE | COMMUNITY
Start: 2024-10-01

## 2024-10-01 RX ORDER — TAMSULOSIN HYDROCHLORIDE 0.4 MG/1
0.4 CAPSULE ORAL
Refills: 0 | Status: ACTIVE | COMMUNITY
Start: 2024-10-01

## 2024-10-01 RX ORDER — LEVOTHYROXINE SODIUM 75 UG/1
75 TABLET ORAL
Refills: 0 | Status: ACTIVE | COMMUNITY
Start: 2024-10-01

## 2024-10-01 RX ORDER — METFORMIN HYDROCHLORIDE 500 MG/1
500 TABLET, COATED ORAL DAILY
Refills: 0 | Status: ACTIVE | COMMUNITY
Start: 2024-10-01

## 2024-11-03 NOTE — CONSULT NOTE ADULT - PROBLEM SELECTOR PROBLEM 1
Physical Therapy    Patient not seen in therapy.     Discontinue therapy: patient request      [facility] Physical Therapy Triage Note    Physical therapy order received and discharged following established triage process with  Occupational therapy.     Romberg balance test and Functional Reach evaluation screens for mobility, balance and safety deficits completed by the Occupational therapist (see results and discharge recommendations in the OT plan of care note).   Results indicate that no acute care level Physical therapy services are indicated.      Post acute recommendations    PT: discharge PT      OT:               Visit Type: discharge     OBJECTIVE                            Therapy procedure time and total treatment time can be found documented on the Time Entry flowsheet   CVA (cerebrovascular accident)

## 2024-11-05 ENCOUNTER — APPOINTMENT (OUTPATIENT)
Dept: ELECTROPHYSIOLOGY | Facility: CLINIC | Age: 84
End: 2024-11-05

## 2024-12-11 ENCOUNTER — OUTPATIENT (OUTPATIENT)
Dept: OUTPATIENT SERVICES | Facility: HOSPITAL | Age: 84
LOS: 1 days | End: 2024-12-11
Payer: MEDICARE

## 2024-12-11 ENCOUNTER — APPOINTMENT (OUTPATIENT)
Dept: MRI IMAGING | Facility: IMAGING CENTER | Age: 84
End: 2024-12-11
Payer: MEDICARE

## 2024-12-11 DIAGNOSIS — I66.9 OCCLUSION AND STENOSIS OF UNSPECIFIED CEREBRAL ARTERY: ICD-10-CM

## 2024-12-11 DIAGNOSIS — Z00.8 ENCOUNTER FOR OTHER GENERAL EXAMINATION: ICD-10-CM

## 2024-12-11 DIAGNOSIS — H53.9 UNSPECIFIED VISUAL DISTURBANCE: ICD-10-CM

## 2024-12-11 PROCEDURE — 70553 MRI BRAIN STEM W/O & W/DYE: CPT | Mod: 26

## 2024-12-11 PROCEDURE — 70553 MRI BRAIN STEM W/O & W/DYE: CPT

## 2024-12-11 PROCEDURE — A9585: CPT

## 2025-06-22 NOTE — PHYSICAL THERAPY INITIAL EVALUATION ADULT - PHYSICAL ASSIST/NONPHYSICAL ASSIST: GAIT, REHAB EVAL
The patient's goals for the shift include      The clinical goals for the shift include pt's pain will be controlled during shift       1 person assist